# Patient Record
Sex: MALE | Race: WHITE | Employment: OTHER | URBAN - METROPOLITAN AREA
[De-identification: names, ages, dates, MRNs, and addresses within clinical notes are randomized per-mention and may not be internally consistent; named-entity substitution may affect disease eponyms.]

---

## 2017-11-27 PROBLEM — A41.9 SEPSIS (HCC): Status: ACTIVE | Noted: 2017-11-27

## 2017-11-27 PROBLEM — L02.414 ABSCESS OF LEFT FOREARM: Status: ACTIVE | Noted: 2017-11-27

## 2017-11-27 PROBLEM — A41.9 SEVERE SEPSIS (HCC): Status: ACTIVE | Noted: 2017-11-27

## 2017-11-27 PROBLEM — R73.9 HYPERGLYCEMIA: Status: ACTIVE | Noted: 2017-11-27

## 2017-11-27 PROBLEM — N17.9 AKI (ACUTE KIDNEY INJURY) (HCC): Status: ACTIVE | Noted: 2017-11-27

## 2017-11-27 PROBLEM — R65.20 SEVERE SEPSIS (HCC): Status: ACTIVE | Noted: 2017-11-27

## 2018-07-29 ENCOUNTER — HOSPITAL ENCOUNTER (INPATIENT)
Age: 48
LOS: 5 days | Discharge: HOME | DRG: 751 | End: 2018-08-04
Attending: EMERGENCY MEDICINE | Admitting: PSYCHIATRY & NEUROLOGY
Payer: MEDICAID

## 2018-07-29 DIAGNOSIS — R45.89 SUICIDE RISK: Primary | ICD-10-CM

## 2018-07-29 DIAGNOSIS — E11.65 POORLY CONTROLLED DIABETES MELLITUS (HCC): ICD-10-CM

## 2018-07-29 DIAGNOSIS — E86.0 DEHYDRATION, MODERATE: ICD-10-CM

## 2018-07-29 DIAGNOSIS — Z91.199 MEDICALLY NONCOMPLIANT: ICD-10-CM

## 2018-07-29 LAB
BASOPHILS ABSOLUTE: 0.1 K/UL (ref 0–0.2)
BASOPHILS RELATIVE PERCENT: 1.1 %
EOSINOPHILS ABSOLUTE: 0.4 K/UL (ref 0–0.6)
EOSINOPHILS RELATIVE PERCENT: 3.9 %
HCT VFR BLD CALC: 43.2 % (ref 40.5–52.5)
HEMOGLOBIN: 14.5 G/DL (ref 13.5–17.5)
LYMPHOCYTES ABSOLUTE: 1.4 K/UL (ref 1–5.1)
LYMPHOCYTES RELATIVE PERCENT: 13.7 %
MCH RBC QN AUTO: 27.9 PG (ref 26–34)
MCHC RBC AUTO-ENTMCNC: 33.5 G/DL (ref 31–36)
MCV RBC AUTO: 83.2 FL (ref 80–100)
MONOCYTES ABSOLUTE: 0.8 K/UL (ref 0–1.3)
MONOCYTES RELATIVE PERCENT: 8 %
NEUTROPHILS ABSOLUTE: 7.4 K/UL (ref 1.7–7.7)
NEUTROPHILS RELATIVE PERCENT: 73.3 %
PDW BLD-RTO: 13.2 % (ref 12.4–15.4)
PLATELET # BLD: 277 K/UL (ref 135–450)
PMV BLD AUTO: 9.4 FL (ref 5–10.5)
RBC # BLD: 5.19 M/UL (ref 4.2–5.9)
WBC # BLD: 10.1 K/UL (ref 4–11)

## 2018-07-29 PROCEDURE — 99285 EMERGENCY DEPT VISIT HI MDM: CPT

## 2018-07-29 PROCEDURE — 80053 COMPREHEN METABOLIC PANEL: CPT

## 2018-07-29 PROCEDURE — 82010 KETONE BODYS QUAN: CPT

## 2018-07-29 PROCEDURE — 81001 URINALYSIS AUTO W/SCOPE: CPT

## 2018-07-29 PROCEDURE — G0480 DRUG TEST DEF 1-7 CLASSES: HCPCS

## 2018-07-29 PROCEDURE — 85025 COMPLETE CBC W/AUTO DIFF WBC: CPT

## 2018-07-29 PROCEDURE — 80307 DRUG TEST PRSMV CHEM ANLYZR: CPT

## 2018-07-29 ASSESSMENT — PAIN SCALES - GENERAL: PAINLEVEL_OUTOF10: 0

## 2018-07-30 LAB
A/G RATIO: 0.8 (ref 1.1–2.2)
A/G RATIO: 0.9 (ref 1.1–2.2)
ACETAMINOPHEN LEVEL: <5 UG/ML (ref 10–30)
ALBUMIN SERPL-MCNC: 2.5 G/DL (ref 3.4–5)
ALBUMIN SERPL-MCNC: 3.3 G/DL (ref 3.4–5)
ALP BLD-CCNC: 151 U/L (ref 40–129)
ALP BLD-CCNC: 94 U/L (ref 40–129)
ALT SERPL-CCNC: 10 U/L (ref 10–40)
ALT SERPL-CCNC: 9 U/L (ref 10–40)
AMPHETAMINE SCREEN, URINE: ABNORMAL
ANION GAP SERPL CALCULATED.3IONS-SCNC: 13 MMOL/L (ref 3–16)
ANION GAP SERPL CALCULATED.3IONS-SCNC: 9 MMOL/L (ref 3–16)
AST SERPL-CCNC: 11 U/L (ref 15–37)
AST SERPL-CCNC: 13 U/L (ref 15–37)
BARBITURATE SCREEN URINE: ABNORMAL
BASE EXCESS VENOUS: 0.1 MMOL/L (ref -3–3)
BENZODIAZEPINE SCREEN, URINE: ABNORMAL
BETA-HYDROXYBUTYRATE: 0.1 MMOL/L (ref 0–0.27)
BILIRUB SERPL-MCNC: <0.2 MG/DL (ref 0–1)
BILIRUB SERPL-MCNC: <0.2 MG/DL (ref 0–1)
BILIRUBIN URINE: NEGATIVE
BLOOD, URINE: NEGATIVE
BUN BLDV-MCNC: 27 MG/DL (ref 7–20)
BUN BLDV-MCNC: 31 MG/DL (ref 7–20)
CALCIUM SERPL-MCNC: 8.6 MG/DL (ref 8.3–10.6)
CALCIUM SERPL-MCNC: 9.7 MG/DL (ref 8.3–10.6)
CANNABINOID SCREEN URINE: POSITIVE
CARBOXYHEMOGLOBIN: 7.1 % (ref 0–1.5)
CHLORIDE BLD-SCNC: 101 MMOL/L (ref 99–110)
CHLORIDE BLD-SCNC: 90 MMOL/L (ref 99–110)
CHP ED QC CHECK: YES
CLARITY: CLEAR
CO2: 20 MMOL/L (ref 21–32)
CO2: 24 MMOL/L (ref 21–32)
COCAINE METABOLITE SCREEN URINE: ABNORMAL
COLOR: YELLOW
CREAT SERPL-MCNC: 0.9 MG/DL (ref 0.9–1.3)
CREAT SERPL-MCNC: 1.4 MG/DL (ref 0.9–1.3)
ETHANOL: NORMAL MG/DL (ref 0–0.08)
GFR AFRICAN AMERICAN: >60
GFR AFRICAN AMERICAN: >60
GFR NON-AFRICAN AMERICAN: 55
GFR NON-AFRICAN AMERICAN: >60
GLOBULIN: 3.1 G/DL
GLOBULIN: 3.7 G/DL
GLUCOSE BLD-MCNC: 231 MG/DL (ref 70–99)
GLUCOSE BLD-MCNC: 252 MG/DL (ref 70–99)
GLUCOSE BLD-MCNC: 308 MG/DL (ref 70–99)
GLUCOSE BLD-MCNC: 324 MG/DL
GLUCOSE BLD-MCNC: 324 MG/DL (ref 70–99)
GLUCOSE BLD-MCNC: 348 MG/DL (ref 70–99)
GLUCOSE BLD-MCNC: 409 MG/DL (ref 70–99)
GLUCOSE BLD-MCNC: 824 MG/DL (ref 70–99)
GLUCOSE URINE: >=1000 MG/DL
HCO3 VENOUS: 25.2 MMOL/L (ref 23–29)
KETONES, URINE: NEGATIVE MG/DL
LEUKOCYTE ESTERASE, URINE: NEGATIVE
Lab: ABNORMAL
METHADONE SCREEN, URINE: ABNORMAL
METHEMOGLOBIN VENOUS: 0.2 %
MICROSCOPIC EXAMINATION: YES
NITRITE, URINE: NEGATIVE
O2 CONTENT, VEN: 19 VOL %
O2 SAT, VEN: 94 %
O2 THERAPY: ABNORMAL
OPIATE SCREEN URINE: ABNORMAL
OXYCODONE URINE: ABNORMAL
PCO2, VEN: 42.7 MMHG (ref 40–50)
PERFORMED ON: ABNORMAL
PH UA: 5
PH UA: 6
PH VENOUS: 7.39 (ref 7.35–7.45)
PHENCYCLIDINE SCREEN URINE: ABNORMAL
PO2, VEN: 69.2 MMHG (ref 25–40)
POTASSIUM SERPL-SCNC: 3.8 MMOL/L (ref 3.5–5.1)
POTASSIUM SERPL-SCNC: 4.3 MMOL/L (ref 3.5–5.1)
PROPOXYPHENE SCREEN: ABNORMAL
PROTEIN UA: ABNORMAL MG/DL
RBC UA: NORMAL /HPF (ref 0–2)
SALICYLATE, SERUM: <0.3 MG/DL (ref 15–30)
SODIUM BLD-SCNC: 127 MMOL/L (ref 136–145)
SODIUM BLD-SCNC: 130 MMOL/L (ref 136–145)
SPECIFIC GRAVITY UA: <=1.005
TCO2 CALC VENOUS: 27 MMOL/L
TOTAL PROTEIN: 5.6 G/DL (ref 6.4–8.2)
TOTAL PROTEIN: 7 G/DL (ref 6.4–8.2)
URINE TYPE: ABNORMAL
UROBILINOGEN, URINE: 0.2 E.U./DL
WBC UA: NORMAL /HPF (ref 0–5)

## 2018-07-30 PROCEDURE — 6370000000 HC RX 637 (ALT 250 FOR IP): Performed by: PSYCHIATRY & NEUROLOGY

## 2018-07-30 PROCEDURE — 1240000000 HC EMOTIONAL WELLNESS R&B

## 2018-07-30 PROCEDURE — 6370000000 HC RX 637 (ALT 250 FOR IP): Performed by: EMERGENCY MEDICINE

## 2018-07-30 PROCEDURE — 83036 HEMOGLOBIN GLYCOSYLATED A1C: CPT

## 2018-07-30 PROCEDURE — 80053 COMPREHEN METABOLIC PANEL: CPT

## 2018-07-30 PROCEDURE — 99222 1ST HOSP IP/OBS MODERATE 55: CPT | Performed by: PHYSICIAN ASSISTANT

## 2018-07-30 PROCEDURE — 2580000003 HC RX 258: Performed by: EMERGENCY MEDICINE

## 2018-07-30 PROCEDURE — 36415 COLL VENOUS BLD VENIPUNCTURE: CPT

## 2018-07-30 PROCEDURE — 82803 BLOOD GASES ANY COMBINATION: CPT

## 2018-07-30 PROCEDURE — 6370000000 HC RX 637 (ALT 250 FOR IP): Performed by: PHYSICIAN ASSISTANT

## 2018-07-30 RX ORDER — FENOFIBRATE 160 MG/1
160 TABLET ORAL DAILY
Status: DISCONTINUED | OUTPATIENT
Start: 2018-07-30 | End: 2018-08-04 | Stop reason: HOSPADM

## 2018-07-30 RX ORDER — RANITIDINE 300 MG/1
300 TABLET ORAL NIGHTLY
Status: DISCONTINUED | OUTPATIENT
Start: 2018-07-30 | End: 2018-07-30

## 2018-07-30 RX ORDER — NICOTINE POLACRILEX 4 MG
15 LOZENGE BUCCAL PRN
Status: DISCONTINUED | OUTPATIENT
Start: 2018-07-30 | End: 2018-08-04 | Stop reason: HOSPADM

## 2018-07-30 RX ORDER — NICOTINE 21 MG/24HR
1 PATCH, TRANSDERMAL 24 HOURS TRANSDERMAL DAILY
Status: DISCONTINUED | OUTPATIENT
Start: 2018-07-30 | End: 2018-08-04 | Stop reason: HOSPADM

## 2018-07-30 RX ORDER — ACETAMINOPHEN 325 MG/1
650 TABLET ORAL EVERY 4 HOURS PRN
Status: DISCONTINUED | OUTPATIENT
Start: 2018-07-30 | End: 2018-08-04 | Stop reason: HOSPADM

## 2018-07-30 RX ORDER — GLUCAGON 1 MG/ML
1 KIT INJECTION PRN
Status: DISCONTINUED | OUTPATIENT
Start: 2018-07-30 | End: 2018-08-04 | Stop reason: HOSPADM

## 2018-07-30 RX ORDER — ASPIRIN 81 MG/1
81 TABLET, CHEWABLE ORAL DAILY
Status: DISCONTINUED | OUTPATIENT
Start: 2018-07-30 | End: 2018-08-04 | Stop reason: HOSPADM

## 2018-07-30 RX ORDER — FAMOTIDINE 20 MG/1
40 TABLET, FILM COATED ORAL NIGHTLY
Status: DISCONTINUED | OUTPATIENT
Start: 2018-07-30 | End: 2018-08-04 | Stop reason: HOSPADM

## 2018-07-30 RX ORDER — DEXTROSE MONOHYDRATE 50 MG/ML
100 INJECTION, SOLUTION INTRAVENOUS PRN
Status: DISCONTINUED | OUTPATIENT
Start: 2018-07-30 | End: 2018-08-04 | Stop reason: HOSPADM

## 2018-07-30 RX ORDER — LOSARTAN POTASSIUM 25 MG/1
50 TABLET ORAL 2 TIMES DAILY
Status: DISCONTINUED | OUTPATIENT
Start: 2018-07-30 | End: 2018-08-04 | Stop reason: HOSPADM

## 2018-07-30 RX ORDER — 0.9 % SODIUM CHLORIDE 0.9 %
1000 INTRAVENOUS SOLUTION INTRAVENOUS ONCE
Status: DISCONTINUED | OUTPATIENT
Start: 2018-07-30 | End: 2018-07-30

## 2018-07-30 RX ORDER — BUPROPION HYDROCHLORIDE 150 MG/1
150 TABLET ORAL DAILY
Status: DISCONTINUED | OUTPATIENT
Start: 2018-07-30 | End: 2018-08-04 | Stop reason: HOSPADM

## 2018-07-30 RX ORDER — DEXTROSE MONOHYDRATE 25 G/50ML
12.5 INJECTION, SOLUTION INTRAVENOUS PRN
Status: DISCONTINUED | OUTPATIENT
Start: 2018-07-30 | End: 2018-08-04 | Stop reason: HOSPADM

## 2018-07-30 RX ORDER — 0.9 % SODIUM CHLORIDE 0.9 %
1000 INTRAVENOUS SOLUTION INTRAVENOUS ONCE
Status: COMPLETED | OUTPATIENT
Start: 2018-07-30 | End: 2018-07-30

## 2018-07-30 RX ORDER — PANTOPRAZOLE SODIUM 40 MG/1
40 TABLET, DELAYED RELEASE ORAL DAILY
Status: DISCONTINUED | OUTPATIENT
Start: 2018-07-30 | End: 2018-08-04 | Stop reason: HOSPADM

## 2018-07-30 RX ORDER — INSULIN GLARGINE 100 [IU]/ML
50 INJECTION, SOLUTION SUBCUTANEOUS NIGHTLY
Status: DISCONTINUED | OUTPATIENT
Start: 2018-07-30 | End: 2018-08-04 | Stop reason: HOSPADM

## 2018-07-30 RX ORDER — FLUOXETINE HYDROCHLORIDE 20 MG/1
20 CAPSULE ORAL DAILY
Status: DISCONTINUED | OUTPATIENT
Start: 2018-07-30 | End: 2018-07-30

## 2018-07-30 RX ADMIN — ASPIRIN 81 MG CHEWABLE TABLET 81 MG: 81 TABLET CHEWABLE at 15:47

## 2018-07-30 RX ADMIN — FENOFIBRATE 160 MG: 160 TABLET ORAL at 15:47

## 2018-07-30 RX ADMIN — ACETAMINOPHEN 650 MG: 325 TABLET ORAL at 21:28

## 2018-07-30 RX ADMIN — SODIUM CHLORIDE 1000 ML: 9 INJECTION, SOLUTION INTRAVENOUS at 01:55

## 2018-07-30 RX ADMIN — SODIUM CHLORIDE 1000 ML: 9 INJECTION, SOLUTION INTRAVENOUS at 03:50

## 2018-07-30 RX ADMIN — INSULIN LISPRO 4 UNITS: 100 INJECTION, SOLUTION INTRAVENOUS; SUBCUTANEOUS at 21:20

## 2018-07-30 RX ADMIN — LOSARTAN POTASSIUM 50 MG: 25 TABLET, FILM COATED ORAL at 21:15

## 2018-07-30 RX ADMIN — PANTOPRAZOLE SODIUM 40 MG: 40 TABLET, DELAYED RELEASE ORAL at 15:47

## 2018-07-30 RX ADMIN — INSULIN HUMAN 4 UNITS: 100 INJECTION, SOLUTION PARENTERAL at 11:53

## 2018-07-30 RX ADMIN — SODIUM CHLORIDE 8 UNITS/HR: 9 INJECTION, SOLUTION INTRAVENOUS at 01:56

## 2018-07-30 RX ADMIN — FAMOTIDINE 40 MG: 20 TABLET ORAL at 21:14

## 2018-07-30 RX ADMIN — INSULIN LISPRO 6 UNITS: 100 INJECTION, SOLUTION INTRAVENOUS; SUBCUTANEOUS at 17:06

## 2018-07-30 RX ADMIN — LOSARTAN POTASSIUM 50 MG: 25 TABLET, FILM COATED ORAL at 15:47

## 2018-07-30 RX ADMIN — INSULIN HUMAN 8 UNITS: 100 INJECTION, SOLUTION PARENTERAL at 09:27

## 2018-07-30 RX ADMIN — INSULIN GLARGINE 50 UNITS: 100 INJECTION, SOLUTION SUBCUTANEOUS at 21:22

## 2018-07-30 RX ADMIN — BUPROPION HYDROCHLORIDE 150 MG: 150 TABLET, FILM COATED, EXTENDED RELEASE ORAL at 15:47

## 2018-07-30 ASSESSMENT — SLEEP AND FATIGUE QUESTIONNAIRES
SLEEP PATTERN: DISTURBED/INTERRUPTED SLEEP
DIFFICULTY ARISING: NO
DO YOU USE A SLEEP AID: NO
AVERAGE NUMBER OF SLEEP HOURS: 8
DIFFICULTY STAYING ASLEEP: YES
DO YOU HAVE DIFFICULTY SLEEPING: YES
DIFFICULTY FALLING ASLEEP: NO
RESTFUL SLEEP: NO

## 2018-07-30 ASSESSMENT — PATIENT HEALTH QUESTIONNAIRE - PHQ9: SUM OF ALL RESPONSES TO PHQ QUESTIONS 1-9: 3

## 2018-07-30 ASSESSMENT — PAIN SCALES - GENERAL: PAINLEVEL_OUTOF10: 6

## 2018-07-30 ASSESSMENT — LIFESTYLE VARIABLES: HISTORY_ALCOHOL_USE: NO

## 2018-07-30 NOTE — ED NOTES
BS rechecked notified Dr. Carol Barker. New orders obtained.       Talya Osullivan, MUKUND  07/30/18 0085

## 2018-07-30 NOTE — ED PROVIDER NOTES
Triage Chief Complaint:   Suicidal (pt states \"I want to go to sleep and never wake up\" No immediate plan. Pt is homeless.)    [place]:  Chris Burns is a 41 y.o. male that presents with thoughts of suicide. He's been having thoughts of suicide for some time. These thoughts become more serious in the past 24 hours. Her department for evaluation. He does not have a plan. No previous history of suicide attempt. He has diabetes and uses insulin. Denies fever. No headache or stiff neck. Has slight cough. Does smoke. No productive sputum or hemoptysis. No chest pain or palpitations. No abdominal pain or vomiting. No flank pain or urinary symptoms. ROS:  Total 10 systems are reviewed and are negative except for the HPI.     Past Medical History:   Diagnosis Date    Back pain     Chronic    Bipolar 1 disorder ([facility] 2/13    Chest pain     Secondary to GERD    Chronic kidney disease     CKD (chronic kidney disease)     DDD (degenerative disc disease), lumbar 2013    + MRI - no narcotics from this office    Depression, endogenous ([address]     suicide attempt 10/11, [facility]; [facility]    Diabetes mellitus ([facility] [address] 1998    GERD (gastroesophageal reflux disease)     Hepatitis C 11/29/2017    + RNA by pcr    Hepatitis C antibody positive in blood 11/27/2017    AB +    Hyperlipidemia     LDL 80    Hypertension     Hypotestosteronism 1/12    Treatment not effective per Dr Lorelei Sylvester Marijuana use     Narcotic abuse     Opiate abuse, continuous     Proteinuria     Biopsy proven diabetic nephropathy    Vitamin D deficiency 6/25/2013     Past Surgical History:   Procedure Laterality Date    APPENDECTOMY  1985    BACK SURGERY  4/25/2013    Hemilaminectomy L5S1 right/Disectomy of Herniated L5S1 right    DENTAL SURGERY  2001    oral surgey    KIDNEY BIOPSY      KNEE ARTHROSCOPY      cyst removal    OTHER SURGICAL HISTORY Left 11/28/2017    left arm Irrigation and drainage    VASECTOMY       Family History   Problem Relation Age of Onset    Depression Father     Heart Disease Father     High Blood Pressure Father     High Cholesterol Father     Early Death Father 39     Social History     Social History    Marital status:      Spouse name: N/A    Number of children: N/A    Years of education: N/A     Occupational History    Not on file. Social History Main Topics    Smoking status: Current Every Day Smoker     Packs/day: 1.00     Years: 23.00     Types: Cigarettes    Smokeless tobacco: Never Used    Alcohol use No    Drug use: Yes     Types: Marijuana, IV, Methamphetamines      Comment: IV meth as of 11/27/17.     Sexual activity: Yes     Partners: Female     Other Topics Concern    Not on file     Social History Narrative    No narrative on file     Current Facility-Administered Medications   Medication Dose Route Frequency Provider Last Rate Last Dose    0.9 % sodium chloride bolus  1,000 mL Intravenous Once Dalton Cooper MD 1,000 mL/hr at 07/30/18 0155 1,000 mL at 07/30/18 0155    insulin regular (HUMULIN R;NOVOLIN R) 100 Units in sodium chloride 0.9 % 100 mL infusion  8 Units/hr Intravenous Continuous aDlton Cooper MD 8 mL/hr at 07/30/18 0156 8 Units/hr at 07/30/18 0156     Current Outpatient Prescriptions   Medication Sig Dispense Refill    FLUoxetine (PROZAC) 20 MG capsule Take 20 mg by mouth daily      vitamin D (ERGOCALCIFEROL) 25343 units CAPS capsule Take 50,000 Units by mouth once a week      fenofibrate (TRICOR) 145 MG tablet Take 145 mg by mouth daily      ranitidine (ZANTAC) 300 MG tablet Take 300 mg by mouth nightly      naproxen (NAPROSYN) 500 MG tablet Take 1 tablet by mouth 2 times daily (with meals) for 5 days 10 tablet 0    Acetaminophen 325 MG CAPS Take 650 mg by mouth every 6 hours as needed (pain) 32 capsule 0    insulin glargine (LANTUS) 100 UNIT/ML injection vial Inject 20 Units into the skin nightly 1 vial 3    losartan (COZAAR) 50 MG tablet Take 1 tablet by mouth 2 times daily 30 tablet 3    glucose blood VI test strips (FREESTYLE LITE) strip Patient test three times daily due to fluctuating blood sugars  Diagnosis = 250.00 NIDDM 100 strip 3    nicotine (NICODERM CQ) 21 MG/24HR Place 1 patch onto the skin daily 30 patch 3    Alcohol Swabs PADS 1 each by Does not apply route 4 times daily 150 each 5    FREESTYLE LANCETS MISC 1 each by Does not apply route 4 times daily 150 each 3    glucose monitoring kit (FREESTYLE) monitoring kit 1 kit by Does not apply route 4 times daily 1 kit 0    Insulin Pen Needle 29G X 12.7MM MISC 1 each by Does not apply route 4 times daily 100 each 3    HUMALOG 100 UNIT/ML injection vial INJECT 0-16 UNITS 3 TIMES A DAY BEFORE MEALS  3    citalopram (CELEXA) 40 MG tablet Take 40 mg by mouth daily      pantoprazole (PROTONIX) 40 MG tablet Take 40 mg by mouth daily      aspirin 81 MG tablet Take 81 mg by mouth daily. Allergies   Allergen Reactions    Mushroom Extract Complex Anaphylaxis    Metformin And Related Other (See Comments)      DOES NOT WANT PATIENT TAKING DUE TO STAGE 3 KIDNEY FAILURE    Penicillins     Ultram [Tramadol Hcl]      States he gets HAs     Codeine Rash     Rash         Nursing Notes Reviewed    Physical Exam:  ED Triage Vitals [07/29/18 2237]   Enc Vitals Group      BP (!) 142/71      Pulse 85      Resp 16      Temp 98.2 °F (36.8 °C)      Temp Source Tympanic      SpO2 98 %      Weight 140 lb (63.5 kg)      Height 5' 10\" (1.778 m)      Head Circumference       Peak Flow       Pain Score       Pain Loc       Pain Edu? Excl. in [address]? GENERAL APPEARANCE: Awake and alert. Cooperative. No acute distress. He is sitting up for examination. HEAD: Normocephalic. Atraumatic. EYES: EOM's grossly intact. Sclera anicteric. PEERL  ENT: Mucous membranes are moist. Tolerates saliva. No trismus. NECK: Supple. No meningismus. Trachea midline. No JVD. HEART: RRR. Radial pulses 2+.   Heart without murmur. LUNGS: Respirations unlabored. CTAB  ABDOMEN: Soft. Non-tender. No guarding or rebound. No CVAT. EXTREMITIES: No acute deformities. Hand  are equal.  No joint swelling. No lower extremity calf pain or edema. SKIN: Warm and dry. NEUROLOGICAL:  Moves all 4 extremities spontaneously. No focal motor or sensory abnormalities of the upper or lower extremities. PSYCHIATRIC: Normal mood.     I have reviewed and interpreted all of the currently available lab results from this visit (if applicable):  Results for orders placed or performed during the hospital encounter of 07/29/18   CBC Auto Differential   Result Value Ref Range    WBC 10.1 4.0 - 11.0 K/uL    RBC 5.19 4.20 - 5.90 M/uL    Hemoglobin 14.5 13.5 - 17.5 g/dL    Hematocrit 43.2 40.5 - 52.5 %    MCV 83.2 80.0 - 100.0 fL    MCH 27.9 26.0 - 34.0 pg    MCHC 33.5 31.0 - 36.0 g/dL    RDW 13.2 12.4 - 15.4 %    Platelets 038 494 - 295 K/uL    MPV 9.4 5.0 - 10.5 fL    Neutrophils % 73.3 %    Lymphocytes % 13.7 %    Monocytes % 8.0 %    Eosinophils % 3.9 %    Basophils % 1.1 %    Neutrophils # 7.4 1.7 - 7.7 K/uL    Lymphocytes # 1.4 1.0 - 5.1 K/uL    Monocytes # 0.8 0.0 - 1.3 K/uL    Eosinophils # 0.4 0.0 - 0.6 K/uL    Basophils # 0.1 0.0 - 0.2 K/uL   Comprehensive Metabolic Panel   Result Value Ref Range    Sodium 127 (L) 136 - 145 mmol/L    Potassium 4.3 3.5 - 5.1 mmol/L    Chloride 90 (L) 99 - 110 mmol/L    CO2 24 21 - 32 mmol/L    Anion Gap 13 3 - 16    Glucose 824 (HH) 70 - 99 mg/dL    BUN 31 (H) 7 - 20 mg/dL    CREATININE 1.4 (H) 0.9 - 1.3 mg/dL    GFR Non-African American 55 (A) >60    GFR African American >60 >60    Calcium 9.7 8.3 - 10.6 mg/dL    Total Protein 7.0 6.4 - 8.2 g/dL    Alb 3.3 (L) 3.4 - 5.0 g/dL    Albumin/Globulin Ratio 0.9 (L) 1.1 - 2.2    Total Bilirubin <0.2 0.0 - 1.0 mg/dL    Alkaline Phosphatase 151 (H) 40 - 129 U/L    ALT 9 (L) 10 - 40 U/L    AST 13 (L) 15 - 37 U/L    Globulin 3.7 g/dL   Ethanol   Result Value Ref 2 hours. Initial glucose comes down from 800 to 400 to 230 with very brief infusion of insulin. I do not believe the patient's in DKA. Appears to be very sensitive to insulin. I believe hydration and proper insulin therapy is all that is required to control his glucose. Patient is medically stable. I recommend evaluation by the [facility] staff. Disposition as per the [facility] staff. Clinical Impression:  1. Suicide risk    2. Poorly controlled diabetes mellitus (HCC)    3. Dehydration, moderate    4.  Medically noncompliant      (Please note that portions of this note may have been completed with a voice recognition program. Efforts were made to edit the dictations but occasionally words are mis-transcribed.)    MD Alessandro Joseph MD  07/30/18 3647

## 2018-07-30 NOTE — PLAN OF CARE
Problem: Altered Mood, Depressive Behavior:  Goal: Able to verbalize and/or display a decrease in depressive symptoms  Able to verbalize and/or display a decrease in depressive symptoms   Outcome: Ongoing  Affect flat. Pleasant and cooperative. Medication compliant. Problem: Altered Mood, Deterioration in Function:  Goal: Maintenance of adequate nutrition will improve  Maintenance of adequate nutrition will improve  Outcome: Ongoing  Able to eat 80% of evening meal. Educated on dietary assistance with menu choices.

## 2018-07-30 NOTE — ED NOTES
Call placed to Dr. Tal Willard. Message left awaiting call back.       Yumiko Duff RN  07/30/18 4937

## 2018-07-30 NOTE — H&P
11/28/2017    left arm Irrigation and drainage    VASECTOMY         Medications Prior to Admission:    Prior to Admission medications    Medication Sig Start Date End Date Taking?  Authorizing Provider   FLUoxetine (PROZAC) 20 MG capsule Take 20 mg by mouth daily    Historical Provider, MD   vitamin D (ERGOCALCIFEROL) 40691 units CAPS capsule Take 50,000 Units by mouth once a week    Historical Provider, MD   fenofibrate (TRICOR) 145 MG tablet Take 145 mg by mouth daily    Historical Provider, MD   ranitidine (ZANTAC) 300 MG tablet Take 300 mg by mouth nightly    Historical Provider, MD   naproxen (NAPROSYN) 500 MG tablet Take 1 tablet by mouth 2 times daily (with meals) for 5 days 1/23/18 1/28/18  Tonya Andujar MD   Acetaminophen 325 MG CAPS Take 650 mg by mouth every 6 hours as needed (pain) 1/23/18 1/28/18  Tonya Andujar MD   insulin glargine (LANTUS) 100 UNIT/ML injection vial Inject 20 Units into the skin nightly  Patient taking differently: Inject 50 Units into the skin nightly  11/29/17   Pancho Area, MD   losartan (COZAAR) 50 MG tablet Take 1 tablet by mouth 2 times daily 11/29/17   Pancho Concepcion MD   glucose blood VI test strips (FREESTYLE LITE) strip Patient test three times daily due to fluctuating blood sugars  Diagnosis = 250.00 NIDDM 11/29/17 12/4/18  Pancho Concepcion MD   nicotine (NICODERM CQ) 21 MG/24HR Place 1 patch onto the skin daily 11/30/17   Pancho Area, MD   Alcohol Swabs PADS 1 each by Does not apply route 4 times daily 11/29/17   Pancho Concepcion MD   FREESTYLE LANCETS MISC 1 each by Does not apply route 4 times daily 11/29/17   Pancho Concepcion MD   glucose monitoring kit (FREESTYLE) monitoring kit 1 kit by Does not apply route 4 times daily 11/29/17   Pancho Concepcion MD   Insulin Pen Needle 29G X 12.7MM MISC 1 each by Does not apply route 4 times daily 11/29/17   Pancho Concepcion MD   HUMALOG 100 UNIT/ML injection vial INJECT 0-16 UNITS 3 TIMES A DAY BEFORE MEALS 10/25/17   Historical Provider, MD   pantoprazole (PROTONIX) 40 MG tablet Take 40 mg by mouth daily    Historical Provider, MD   aspirin 81 MG tablet Take 81 mg by mouth daily. Historical Provider, MD       Allergies:  Mushroom extract complex; Metformin and related; Penicillins; Ultram [tramadol hcl]; and Codeine    Social History:      TOBACCO:   reports that he has been smoking Cigarettes. He has a 23.00 pack-year smoking history. He has never used smokeless tobacco.  ETOH:   reports that he does not drink alcohol. Family History:   Positive as follows:    Family History   Problem Relation Age of Onset    Depression Father     Heart Disease Father     High Blood Pressure Father     High Cholesterol Father     Early Death Father 39       REVIEW OF SYSTEMS:       Constitutional: Negative for fever   HENT: Negative for sore throat   Eyes: Negative for redness   Respiratory: Negative  for dyspnea, cough   Cardiovascular: Negative for chest pain   Gastrointestinal: Negative for vomiting, diarrhea   Genitourinary: Negative for hematuria   Musculoskeletal: Negative for arthralgias   Skin: Negative for rash   Neurological: Negative for syncope    Hematological: Negative for easy bruising/bleeding   Psychiatric/Behavorial: Per psychiatry team evaluation     PHYSICAL EXAM:    BP (!) 161/76   Pulse 75   Temp 97.8 °F (36.6 °C) (Oral)   Resp 17   Ht 5' 10\" (1.778 m)   Wt 140 lb (63.5 kg)   SpO2 99%   BMI 20.09 kg/m²     Gen: Middle aged male. No distress. Alert. Eyes: PERRL. No sclera icterus. No conjunctival injection. ENT: No discharge. Pharynx clear. Neck: No JVD. No Carotid Bruit. Trachea midline. Resp: No accessory muscle use. No crackles. No wheezes. No rhonchi. CV: Regular rate. Regular rhythm. No murmur. No rub. No edema. GI: Non-tender. Non-distended. Normal bowel sounds. Skin: Warm and dry. No nodule on exposed extremities. No rash on exposed extremities.    M/S: No cyanosis. No joint deformity. No clubbing. Neuro: Awake. No focal neurologic deficit on exam.  Cranial nerves II through XII intact. Patient is able to ambulate without difficulty. Psych: Per psychiatry team evaluation     CBC:   Recent Labs      07/29/18   2315   WBC  10.1   HGB  14.5   HCT  43.2   MCV  83.2   PLT  277     BMP:   Recent Labs      07/29/18   2315  07/30/18   0832   NA  127*  130*   K  4.3  3.8   CL  90*  101   CO2  24  20*   BUN  31*  27*   CREATININE  1.4*  0.9     LIVER PROFILE:   Recent Labs      07/29/18   2315  07/30/18   0832   AST  13*  11*   ALT  9*  10   BILITOT  <0.2  <0.2   ALKPHOS  151*  94     UA:  Recent Labs      07/29/18   2300   COLORU  Yellow   PHUR  6.0  5.0   WBCUA  None seen   RBCUA  None seen   CLARITYU  Clear   SPECGRAV  <=1.005   LEUKOCYTESUR  Negative   UROBILINOGEN  0.2   BILIRUBINUR  Negative   BLOODU  Negative   GLUCOSEU  >=1000*          U/A:    Lab Results   Component Value Date    NITRITE n 01/18/2012    COLORU Yellow 07/29/2018    WBCUA None seen 07/29/2018    RBCUA None seen 07/29/2018    MUCUS Rare 06/19/2018    BACTERIA 1+ 06/19/2018    CLARITYU Clear 07/29/2018    SPECGRAV <=1.005 07/29/2018    LEUKOCYTESUR Negative 07/29/2018    BLOODU Negative 07/29/2018    GLUCOSEU >=1000 07/29/2018    GLUCOSEU >=1000 10/10/2011    AMORPHOUS 3+ 06/19/2018         ASSESSMENT/PLAN:  Bipolar 1 DO  - per psychiatry team    IDDM  - uncontrolled with  in the ED which has come down nicely with insulin.   He had not used any insulin for 4-5 days prior to admission  - resume home lantus 50 U QHS and use SSI for now     Hyponatremia  - false related to hyperglycemia    HTN  - uncontrolled, restart home cozaar and monitor     GERD  - cont PPI    THC abuse  Tobacco Abuse  -Recommended cessation    Former Opiate dependence  - no heroin use since 2015     Yudy Sanchez PA-C  7/30/2018 2:13 PM

## 2018-07-30 NOTE — ED NOTES
Level of Care Disposition:       Patient was seen by ED provider and [facility] staff. The case presented to psychiatric provider on-call Dr. Kyra Morel. Based on the ED evaluation and information presented to the provider by this nurse it was determined that inpatient hospitalization is the least restrictive environment for the patient at this time. The patient will be admitted to the inpatient unit. RATIONALE FOR ADMISSION:   Patient has a mental illness: Bipolar, SI  Patient at imminent risk of danger to self as demonstrated by SI with thoughts of Overdosing on medications.           Insurance Pre certification Authorization: Lucia Montemayor form filled out and faxed       Jarad Harrington RN  07/30/18 4581

## 2018-07-30 NOTE — ED NOTES
Pt ambulatory to [facility] by ED RN. Pt placed in a gown and belongings placed in locker. Pt urine sample collected.       Stacy Ford  07/29/18 7405

## 2018-07-31 PROBLEM — F33.2 SEVERE EPISODE OF RECURRENT MAJOR DEPRESSIVE DISORDER, WITHOUT PSYCHOTIC FEATURES (HCC): Status: ACTIVE | Noted: 2018-07-31

## 2018-07-31 LAB
ANION GAP SERPL CALCULATED.3IONS-SCNC: 6 MMOL/L (ref 3–16)
BUN BLDV-MCNC: 21 MG/DL (ref 7–20)
CALCIUM SERPL-MCNC: 8.8 MG/DL (ref 8.3–10.6)
CHLORIDE BLD-SCNC: 104 MMOL/L (ref 99–110)
CO2: 25 MMOL/L (ref 21–32)
CREAT SERPL-MCNC: 1 MG/DL (ref 0.9–1.3)
ESTIMATED AVERAGE GLUCOSE: 343.6 MG/DL
GFR AFRICAN AMERICAN: >60
GFR NON-AFRICAN AMERICAN: >60
GLUCOSE BLD-MCNC: 179 MG/DL (ref 70–99)
GLUCOSE BLD-MCNC: 222 MG/DL (ref 70–99)
GLUCOSE BLD-MCNC: 266 MG/DL (ref 70–99)
GLUCOSE BLD-MCNC: 271 MG/DL (ref 70–99)
GLUCOSE BLD-MCNC: 282 MG/DL (ref 70–99)
HBA1C MFR BLD: 13.6 %
PERFORMED ON: ABNORMAL
POTASSIUM SERPL-SCNC: 4.2 MMOL/L (ref 3.5–5.1)
SODIUM BLD-SCNC: 135 MMOL/L (ref 136–145)

## 2018-07-31 PROCEDURE — 6370000000 HC RX 637 (ALT 250 FOR IP): Performed by: PSYCHIATRY & NEUROLOGY

## 2018-07-31 PROCEDURE — 6370000000 HC RX 637 (ALT 250 FOR IP): Performed by: PHYSICIAN ASSISTANT

## 2018-07-31 PROCEDURE — 80048 BASIC METABOLIC PNL TOTAL CA: CPT

## 2018-07-31 PROCEDURE — 99223 1ST HOSP IP/OBS HIGH 75: CPT | Performed by: PSYCHIATRY & NEUROLOGY

## 2018-07-31 PROCEDURE — 36415 COLL VENOUS BLD VENIPUNCTURE: CPT

## 2018-07-31 PROCEDURE — 1240000000 HC EMOTIONAL WELLNESS R&B

## 2018-07-31 RX ORDER — LANOLIN ALCOHOL/MO/W.PET/CERES
6 CREAM (GRAM) TOPICAL NIGHTLY
Status: DISCONTINUED | OUTPATIENT
Start: 2018-07-31 | End: 2018-08-01

## 2018-07-31 RX ADMIN — INSULIN LISPRO 6 UNITS: 100 INJECTION, SOLUTION INTRAVENOUS; SUBCUTANEOUS at 17:13

## 2018-07-31 RX ADMIN — PANTOPRAZOLE SODIUM 40 MG: 40 TABLET, DELAYED RELEASE ORAL at 09:08

## 2018-07-31 RX ADMIN — LOSARTAN POTASSIUM 50 MG: 25 TABLET, FILM COATED ORAL at 21:13

## 2018-07-31 RX ADMIN — INSULIN LISPRO 4 UNITS: 100 INJECTION, SOLUTION INTRAVENOUS; SUBCUTANEOUS at 09:06

## 2018-07-31 RX ADMIN — FENOFIBRATE 160 MG: 160 TABLET ORAL at 09:08

## 2018-07-31 RX ADMIN — INSULIN LISPRO 6 UNITS: 100 INJECTION, SOLUTION INTRAVENOUS; SUBCUTANEOUS at 21:21

## 2018-07-31 RX ADMIN — BUPROPION HYDROCHLORIDE 150 MG: 150 TABLET, FILM COATED, EXTENDED RELEASE ORAL at 09:08

## 2018-07-31 RX ADMIN — INSULIN LISPRO 2 UNITS: 100 INJECTION, SOLUTION INTRAVENOUS; SUBCUTANEOUS at 13:14

## 2018-07-31 RX ADMIN — ASPIRIN 81 MG CHEWABLE TABLET 81 MG: 81 TABLET CHEWABLE at 17:14

## 2018-07-31 RX ADMIN — LOSARTAN POTASSIUM 50 MG: 25 TABLET, FILM COATED ORAL at 09:08

## 2018-07-31 RX ADMIN — FAMOTIDINE 40 MG: 20 TABLET ORAL at 21:13

## 2018-07-31 RX ADMIN — INSULIN GLARGINE 50 UNITS: 100 INJECTION, SOLUTION SUBCUTANEOUS at 21:22

## 2018-07-31 RX ADMIN — MELATONIN 3 MG ORAL TABLET 6 MG: 3 TABLET ORAL at 21:13

## 2018-07-31 ASSESSMENT — SLEEP AND FATIGUE QUESTIONNAIRES
DO YOU USE A SLEEP AID: NO
DIFFICULTY ARISING: YES
RESTFUL SLEEP: NO
DIFFICULTY STAYING ASLEEP: YES
DO YOU HAVE DIFFICULTY SLEEPING: YES
SLEEP PATTERN: DISTURBED/INTERRUPTED SLEEP
AVERAGE NUMBER OF SLEEP HOURS: 8
DIFFICULTY FALLING ASLEEP: YES

## 2018-07-31 ASSESSMENT — LIFESTYLE VARIABLES: HISTORY_ALCOHOL_USE: NO

## 2018-07-31 NOTE — PLAN OF CARE
Problem: Altered Mood, Depressive Behavior:  Goal: Able to verbalize acceptance of life and situations over which he or she has no control  Able to verbalize acceptance of life and situations over which he or she has no control   Outcome: Ongoing  Seclusive to room. Out for snack and to get medications and returns to room. Offers little to no conversation. Sleeping off and on throughout the night. Remains safe on the Unit.

## 2018-07-31 NOTE — H&P
History and Physical Dictated  Spent at least 70 minutes with evaluation process and more than 50% of the time was spent obtaining history and planning treatment with the patient  Dx: MDD

## 2018-07-31 NOTE — PLAN OF CARE
Problem: Altered Mood, Deterioration in Function:  Goal: Maintenance of adequate nutrition will improve  Maintenance of adequate nutrition will improve   Outcome: Ongoing  Up for meals and eating better. Twice tray brought to him, once he did go get his own tray. Secluive to room, otherwise, and no groups attended.

## 2018-07-31 NOTE — PLAN OF CARE
[facility]  Initial Interdisciplinary Treatment Plan NOTE    Review Date & Time: 7/31/18 1000    Patient was not in treatment team    Admission Type:   Admission Type: Voluntary    Reason for admission:  Reason for Admission: SI with thoughts of overdosing on medications, noncompliance with Diabetic Medications      Estimated Length of Stay Update:  3 to 5 days  Estimated Discharge Date Update: 8/2/18 to 8/4/18    PATIENT STRENGTHS:  Patient Strengths Strengths: Communication, Connection to output provider, Social Skills  Patient Strengths and Limitations:Limitations: Difficult relationships / poor social skills, Hopeless about future  Addictive Behavior:Addictive Behavior  In the past 3 months, have you felt or has someone told you that you have a problem with:  : None  Do you have a history of Chemical Use?: No  Do you have a history of Alcohol Use?: No  Do you have a history of Street Drug Abuse?: Yes  Histroy of Prescripton Drug Abuse?: No  Medical Problems:  Past Medical History:   Diagnosis Date    Back pain     Chronic    Bipolar 1 disorder ([facility] [address] 2/13    Chest pain     Secondary to GERD    Chronic kidney disease     CKD (chronic kidney disease)     DDD (degenerative disc disease), lumbar 2013    + MRI - no narcotics from this office    Depression, endogenous ([facility] [address]     suicide attempt 10/11, [facility]; [facility]    Diabetes mellitus ([facility] [address] 1998    GERD (gastroesophageal reflux disease)     Hepatitis C 11/29/2017    + RNA by pcr    Hepatitis C antibody positive in blood 11/27/2017    AB +    Hyperlipidemia         Hypertension     Hypotestosteronism 1/12    Treatment not effective per Dr Rivero    Marijuana use     Narcotic abuse     Opiate abuse, continuous     Proteinuria     Biopsy proven diabetic nephropathy    Vitamin D deficiency 6/25/2013       EDUCATION:   Learner Progress Toward Treatment Goals: Reviewed goals and plan of care    Method: Individual    Outcome: Verbalized understanding    PATIENT GOALS: none    PLAN/TREATMENT RECOMMENDATIONS UPDATE:Continue treatment and medication management.     GOALS UPDATE:   Time frame for Short-Term Goals: 2 days    Winston Nunes RN

## 2018-08-01 LAB
GLUCOSE BLD-MCNC: 104 MG/DL (ref 70–99)
GLUCOSE BLD-MCNC: 229 MG/DL (ref 70–99)
GLUCOSE BLD-MCNC: 244 MG/DL (ref 70–99)
GLUCOSE BLD-MCNC: 384 MG/DL (ref 70–99)
PERFORMED ON: ABNORMAL

## 2018-08-01 PROCEDURE — 1240000000 HC EMOTIONAL WELLNESS R&B

## 2018-08-01 PROCEDURE — 99233 SBSQ HOSP IP/OBS HIGH 50: CPT | Performed by: PSYCHIATRY & NEUROLOGY

## 2018-08-01 PROCEDURE — 6370000000 HC RX 637 (ALT 250 FOR IP): Performed by: PSYCHIATRY & NEUROLOGY

## 2018-08-01 PROCEDURE — 6370000000 HC RX 637 (ALT 250 FOR IP): Performed by: PHYSICIAN ASSISTANT

## 2018-08-01 RX ORDER — MIRTAZAPINE 15 MG/1
15 TABLET, ORALLY DISINTEGRATING ORAL NIGHTLY
Status: DISCONTINUED | OUTPATIENT
Start: 2018-08-01 | End: 2018-08-02

## 2018-08-01 RX ADMIN — INSULIN LISPRO 4 UNITS: 100 INJECTION, SOLUTION INTRAVENOUS; SUBCUTANEOUS at 08:30

## 2018-08-01 RX ADMIN — FENOFIBRATE 160 MG: 160 TABLET ORAL at 08:29

## 2018-08-01 RX ADMIN — BUPROPION HYDROCHLORIDE 150 MG: 150 TABLET, FILM COATED, EXTENDED RELEASE ORAL at 08:29

## 2018-08-01 RX ADMIN — MIRTAZAPINE 15 MG: 15 TABLET, ORALLY DISINTEGRATING ORAL at 20:23

## 2018-08-01 RX ADMIN — FAMOTIDINE 40 MG: 20 TABLET ORAL at 20:23

## 2018-08-01 RX ADMIN — ASPIRIN 81 MG CHEWABLE TABLET 81 MG: 81 TABLET CHEWABLE at 08:30

## 2018-08-01 RX ADMIN — LOSARTAN POTASSIUM 50 MG: 25 TABLET, FILM COATED ORAL at 08:29

## 2018-08-01 RX ADMIN — INSULIN GLARGINE 50 UNITS: 100 INJECTION, SOLUTION SUBCUTANEOUS at 21:13

## 2018-08-01 RX ADMIN — PANTOPRAZOLE SODIUM 40 MG: 40 TABLET, DELAYED RELEASE ORAL at 08:29

## 2018-08-01 RX ADMIN — INSULIN LISPRO 5 UNITS: 100 INJECTION, SOLUTION INTRAVENOUS; SUBCUTANEOUS at 21:13

## 2018-08-01 RX ADMIN — INSULIN LISPRO 4 UNITS: 100 INJECTION, SOLUTION INTRAVENOUS; SUBCUTANEOUS at 17:05

## 2018-08-01 RX ADMIN — LOSARTAN POTASSIUM 50 MG: 25 TABLET, FILM COATED ORAL at 20:23

## 2018-08-01 NOTE — PLAN OF CARE
Problem: Altered Mood, Depressive Behavior:  Goal: Able to verbalize and/or display a decrease in depressive symptoms  Able to verbalize and/or display a decrease in depressive symptoms   Outcome: Ongoing  Has remained in bed, in assigned room, throughout the morning. Does not attend groups. Is medication compliant. Problem: Altered Mood, Deterioration in Function:  Goal: Maintenance of adequate nutrition will improve  Maintenance of adequate nutrition will improve   Outcome: Ongoing  Does retrieve meal tray from cart and eat in his assigned room. Diet and fluids taken good. Verbalizes understanding of dietary needs related to his DM.

## 2018-08-01 NOTE — H&P
rehab in [place] on 04/2018 for 20 days at  [facility]. \"  He also failed the urine drug screen one point for marijuana  and meth that we placed him in rehab. He has a history of heroin abuse,  last use 2015. CURRENT MEDS:  Wellbutrin  mg in morning for 2 weeks. He is also on  insulin, which he had not been compliant with. Apparently, he is typically  on Lantus, Humalog, and in the past, Prozac, Tricor, Zantac, vitamin D.    MEDICAL HISTORY:  Significant for back pain, degenerative disk disease,  chronic kidney disease, diabetes mellitus, GERD, hepatitis C,  hyperlipidemia, hypertension. DRUG AND ALCOHOL:  History of marijuana abuse and narcotic abuse, opiate  abuse, methamphetamine abuse. Denies alcohol. LEGAL ISSUES:  He was in alf for a couple of months in 02/2018. He  violated as an EPO with his wife. He is currently on probation. Apparently, he and his wife were driving around in the car and police  stopped them and realized that he was not allowed to be with her, even  though she was in agreement with him being with her. SOCIAL HISTORY:  Homeless in [facility]. PHYSICAL EXAM, REVIEW OF SYSTEMS, VITAL SIGNS:  Per Tala Ni PA-C,  07/30/2018. LABORATORIES:  Reviewed. TRAUMA HISTORY:  None reported. MENTAL STATUS EXAM:  The patient is a 82-year-old white male who is  relatively cooperative. He made minimal eye contact. He was in bed. He  denied being psychotic and denied any auditory or visual hallucinations. He states he continues to be suicidal.  No homicidal ideation. Speech is  soft tone, normal rate. Insight and judgment is impaired. Thoughts were  coherent and logical.  Alert and oriented to person, place and time. He  says mood was down. Affect was constricted. No abnormal movements. Normal gait. Fund of knowledge and language intact. Attention,  concentration was adequate. Able to recall three objects immediately. DIAGNOSES:  Axis I:  1. Major depressive episode, recurrent, nonpsychotic, severe. 2.  Polysubstance abuse history. Axis II:  Deferred. Axis III:  Diabetes mellitus, chronic kidney disease, degenerative disk  disease, neuropathy and retinopathy. Axis IV:  Severe. Axis V:  40. PLAN:  1. We will continue to monitor for any self-harm threats. 2.  The patient will require a shelter. 3.  Also needs followup through [facility] and has had a  therapist, Cheryl Rankin in the past.  He has an appointment on  08/06/2018 according to his report. 4.  In full program.  5.  Develop appropriate coping strategies when depressed and suicidal.  6.  Discharge in the next 3-4 days.         Shaista Suarez MD    D: 07/31/2018 17:53:34       T: 07/31/2018 17:55:56     [email or web address]  Job#: [id number]     Doc#: [id number]    CC:

## 2018-08-02 LAB
GLUCOSE BLD-MCNC: 178 MG/DL (ref 70–99)
GLUCOSE BLD-MCNC: 291 MG/DL (ref 70–99)
GLUCOSE BLD-MCNC: 298 MG/DL (ref 70–99)
GLUCOSE BLD-MCNC: 409 MG/DL (ref 70–99)
PERFORMED ON: ABNORMAL

## 2018-08-02 PROCEDURE — 6370000000 HC RX 637 (ALT 250 FOR IP): Performed by: PHYSICIAN ASSISTANT

## 2018-08-02 PROCEDURE — 99233 SBSQ HOSP IP/OBS HIGH 50: CPT | Performed by: PSYCHIATRY & NEUROLOGY

## 2018-08-02 PROCEDURE — 6370000000 HC RX 637 (ALT 250 FOR IP): Performed by: PSYCHIATRY & NEUROLOGY

## 2018-08-02 PROCEDURE — 1240000000 HC EMOTIONAL WELLNESS R&B

## 2018-08-02 RX ORDER — QUETIAPINE FUMARATE 25 MG/1
50 TABLET, FILM COATED ORAL NIGHTLY
Status: DISCONTINUED | OUTPATIENT
Start: 2018-08-02 | End: 2018-08-04 | Stop reason: HOSPADM

## 2018-08-02 RX ADMIN — PANTOPRAZOLE SODIUM 40 MG: 40 TABLET, DELAYED RELEASE ORAL at 08:17

## 2018-08-02 RX ADMIN — INSULIN LISPRO 2 UNITS: 100 INJECTION, SOLUTION INTRAVENOUS; SUBCUTANEOUS at 12:08

## 2018-08-02 RX ADMIN — INSULIN LISPRO 6 UNITS: 100 INJECTION, SOLUTION INTRAVENOUS; SUBCUTANEOUS at 21:30

## 2018-08-02 RX ADMIN — LOSARTAN POTASSIUM 50 MG: 25 TABLET, FILM COATED ORAL at 08:17

## 2018-08-02 RX ADMIN — INSULIN LISPRO 6 UNITS: 100 INJECTION, SOLUTION INTRAVENOUS; SUBCUTANEOUS at 17:23

## 2018-08-02 RX ADMIN — FENOFIBRATE 160 MG: 160 TABLET ORAL at 08:17

## 2018-08-02 RX ADMIN — INSULIN LISPRO 6 UNITS: 100 INJECTION, SOLUTION INTRAVENOUS; SUBCUTANEOUS at 08:16

## 2018-08-02 RX ADMIN — ASPIRIN 81 MG CHEWABLE TABLET 81 MG: 81 TABLET CHEWABLE at 08:17

## 2018-08-02 RX ADMIN — QUETIAPINE FUMARATE 50 MG: 25 TABLET ORAL at 21:30

## 2018-08-02 RX ADMIN — INSULIN GLARGINE 50 UNITS: 100 INJECTION, SOLUTION SUBCUTANEOUS at 21:30

## 2018-08-02 RX ADMIN — BUPROPION HYDROCHLORIDE 150 MG: 150 TABLET, FILM COATED, EXTENDED RELEASE ORAL at 08:17

## 2018-08-02 RX ADMIN — LOSARTAN POTASSIUM 50 MG: 25 TABLET, FILM COATED ORAL at 21:30

## 2018-08-02 RX ADMIN — FAMOTIDINE 40 MG: 20 TABLET ORAL at 21:30

## 2018-08-02 NOTE — PROGRESS NOTES
Eosinophils % 07/29/2018 3.9  % Final    Basophils % 07/29/2018 1.1  % Final    Neutrophils # 07/29/2018 7.4  1.7 - 7.7 K/uL Final    Lymphocytes # 07/29/2018 1.4  1.0 - 5.1 K/uL Final    Monocytes # 07/29/2018 0.8  0.0 - 1.3 K/uL Final    Eosinophils # 07/29/2018 0.4  0.0 - 0.6 K/uL Final    Basophils # 07/29/2018 0.1  0.0 - 0.2 K/uL Final    Sodium 07/29/2018 127* 136 - 145 mmol/L Final    Potassium 07/29/2018 4.3  3.5 - 5.1 mmol/L Final    Chloride 07/29/2018 90* 99 - 110 mmol/L Final    CO2 07/29/2018 24  21 - 32 mmol/L Final    Anion Gap 07/29/2018 13  3 - 16 Final    Glucose 07/29/2018 824* 70 - 99 mg/dL Final    BUN 07/29/2018 31* 7 - 20 mg/dL Final    CREATININE 07/29/2018 1.4* 0.9 - 1.3 mg/dL Final    GFR Non- 07/29/2018 55* >60 Final    Comment: >60 mL/min/1.73m2 EGFR, calc. for ages 25 and older using the  MDRD formula (not corrected for weight), is valid for stable  renal function.  GFR  07/29/2018 >60  >60 Final    Comment: Chronic Kidney Disease: less than 60 ml/min/1.73 sq.m. Kidney Failure: less than 15 ml/min/1.73 sq.m. Results valid for patients 18 years and older.       Calcium 07/29/2018 9.7  8.3 - 10.6 mg/dL Final    Total Protein 07/29/2018 7.0  6.4 - 8.2 g/dL Final    Alb 07/29/2018 3.3* 3.4 - 5.0 g/dL Final    Albumin/Globulin Ratio 07/29/2018 0.9* 1.1 - 2.2 Final    Total Bilirubin 07/29/2018 <0.2  0.0 - 1.0 mg/dL Final    Alkaline Phosphatase 07/29/2018 151* 40 - 129 U/L Final    ALT 07/29/2018 9* 10 - 40 U/L Final    AST 07/29/2018 13* 15 - 37 U/L Final    Globulin 07/29/2018 3.7  g/dL Final    Ethanol Lvl 07/29/2018 None Detected  mg/dL Final    Comment:    None Detected  Conversion factor:  100 mg/dl = .100 g/dl  For Medical Purposes Only      Amphetamine Screen, Urine 07/29/2018 Neg  Negative <1000ng/mL Final    Barbiturate Screen, Ur 07/29/2018 Neg  Negative <200 ng/mL Final    Benzodiazepine Screen, Urine 0.0-1.5  (Smokers 1.5-5.0)      MetHgb, Shivam 07/30/2018 0.2  <1.5 % Final    TC02 (Calc), Shivam 07/30/2018 27  Not Established mmol/L Final    O2 Content, Shivam 07/30/2018 19  Not Established VOL % Final    O2 Therapy 07/30/2018 Unknown   Final    POC Glucose 07/30/2018 409* 70 - 99 mg/dl Final    Performed on 07/30/2018 ACCU-CHEK   Final    POC Glucose 07/30/2018 231* 70 - 99 mg/dl Final    Performed on 07/30/2018 ACCU-CHEK   Final    Sodium 07/30/2018 130* 136 - 145 mmol/L Final    Potassium 07/30/2018 3.8  3.5 - 5.1 mmol/L Final    Chloride 07/30/2018 101  99 - 110 mmol/L Final    CO2 07/30/2018 20* 21 - 32 mmol/L Final    Anion Gap 07/30/2018 9  3 - 16 Final    Glucose 07/30/2018 348* 70 - 99 mg/dL Final    BUN 07/30/2018 27* 7 - 20 mg/dL Final    CREATININE 07/30/2018 0.9  0.9 - 1.3 mg/dL Final    GFR Non- 07/30/2018 >60  >60 Final    Comment: >60 mL/min/1.73m2 EGFR, calc. for ages 25 and older using the  MDRD formula (not corrected for weight), is valid for stable  renal function.  GFR  07/30/2018 >60  >60 Final    Comment: Chronic Kidney Disease: less than 60 ml/min/1.73 sq.m. Kidney Failure: less than 15 ml/min/1.73 sq.m. Results valid for patients 18 years and older.       Calcium 07/30/2018 8.6  8.3 - 10.6 mg/dL Final    Total Protein 07/30/2018 5.6* 6.4 - 8.2 g/dL Final    Alb 07/30/2018 2.5* 3.4 - 5.0 g/dL Final    Albumin/Globulin Ratio 07/30/2018 0.8* 1.1 - 2.2 Final    Total Bilirubin 07/30/2018 <0.2  0.0 - 1.0 mg/dL Final    Alkaline Phosphatase 07/30/2018 94  40 - 129 U/L Final    ALT 07/30/2018 10  10 - 40 U/L Final    AST 07/30/2018 11* 15 - 37 U/L Final    Globulin 07/30/2018 3.1  g/dL Final    Color, UA 07/29/2018 Yellow  Straw/Yellow Final    Clarity, UA 07/29/2018 Clear  Clear Final    Glucose, Ur 07/29/2018 >=1000* Negative mg/dL Final    Bilirubin Urine 07/29/2018 Negative  Negative Final    Ketones, Urine 07/29/2018

## 2018-08-02 NOTE — PLAN OF CARE
Problem: Altered Mood, Depressive Behavior:  Goal: Able to verbalize and/or display a decrease in depressive symptoms  Able to verbalize and/or display a decrease in depressive symptoms   Outcome: Not Met This Shift  Pt has been mostly isolative to room, little socialization when out. Attended group. Did laundry. Pleasant but blunted. Denied SI/HI. Denied hallucinations. Talked abt feeling useless. 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 He and wife are . He reported that has no kidney function but is not on dialysis. Wanted to know if kidney Dr could see him while here-referred to Dr for answer. He is homeless. Hopes to get into [facility], then he will be assigned a . All his Drs and mental health tx took place in [facility] (his last residence) but has no . Cont' to be depressed.

## 2018-08-03 LAB
GLUCOSE BLD-MCNC: 184 MG/DL (ref 70–99)
GLUCOSE BLD-MCNC: 337 MG/DL (ref 70–99)
GLUCOSE BLD-MCNC: 351 MG/DL (ref 70–99)
GLUCOSE BLD-MCNC: 453 MG/DL (ref 70–99)
PERFORMED ON: ABNORMAL

## 2018-08-03 PROCEDURE — 1240000000 HC EMOTIONAL WELLNESS R&B

## 2018-08-03 PROCEDURE — 99233 SBSQ HOSP IP/OBS HIGH 50: CPT | Performed by: PSYCHIATRY & NEUROLOGY

## 2018-08-03 PROCEDURE — 6370000000 HC RX 637 (ALT 250 FOR IP): Performed by: PSYCHIATRY & NEUROLOGY

## 2018-08-03 PROCEDURE — 6370000000 HC RX 637 (ALT 250 FOR IP): Performed by: PHYSICIAN ASSISTANT

## 2018-08-03 RX ADMIN — ASPIRIN 81 MG CHEWABLE TABLET 81 MG: 81 TABLET CHEWABLE at 08:48

## 2018-08-03 RX ADMIN — INSULIN LISPRO 2 UNITS: 100 INJECTION, SOLUTION INTRAVENOUS; SUBCUTANEOUS at 12:26

## 2018-08-03 RX ADMIN — FAMOTIDINE 40 MG: 20 TABLET ORAL at 21:32

## 2018-08-03 RX ADMIN — INSULIN GLARGINE 50 UNITS: 100 INJECTION, SOLUTION SUBCUTANEOUS at 21:40

## 2018-08-03 RX ADMIN — INSULIN LISPRO 10 UNITS: 100 INJECTION, SOLUTION INTRAVENOUS; SUBCUTANEOUS at 08:22

## 2018-08-03 RX ADMIN — BUPROPION HYDROCHLORIDE 150 MG: 150 TABLET, FILM COATED, EXTENDED RELEASE ORAL at 08:48

## 2018-08-03 RX ADMIN — PANTOPRAZOLE SODIUM 40 MG: 40 TABLET, DELAYED RELEASE ORAL at 08:47

## 2018-08-03 RX ADMIN — FENOFIBRATE 160 MG: 160 TABLET ORAL at 08:48

## 2018-08-03 RX ADMIN — INSULIN LISPRO 4 UNITS: 100 INJECTION, SOLUTION INTRAVENOUS; SUBCUTANEOUS at 21:38

## 2018-08-03 RX ADMIN — INSULIN LISPRO 12 UNITS: 100 INJECTION, SOLUTION INTRAVENOUS; SUBCUTANEOUS at 17:12

## 2018-08-03 RX ADMIN — QUETIAPINE FUMARATE 50 MG: 25 TABLET ORAL at 21:32

## 2018-08-03 RX ADMIN — LOSARTAN POTASSIUM 50 MG: 25 TABLET, FILM COATED ORAL at 21:32

## 2018-08-03 RX ADMIN — LOSARTAN POTASSIUM 50 MG: 25 TABLET, FILM COATED ORAL at 08:48

## 2018-08-03 NOTE — PLAN OF CARE
[facility]  Day 3 Interdisciplinary Treatment Plan NOTE    Review Date & Time: 8/3/18 1026    Patient was not in treatment team    Admission Type:   Admission Type: Voluntary    Reason for admission:  Reason for Admission: SI with thoughts of overdosing on medications, noncompliance with Diabetic Medications  Estimated Length of Stay Update:  1-2 days  Estimated Discharge Date Update: 8/4/18    PATIENT STRENGTHS:  Patient Strengths Strengths: Communication, Connection to output provider  Patient Strengths and Limitations:Limitations: Tendency to isolate self, Lacks leisure interests  Addictive Behavior:Addictive Behavior  In the past 3 months, have you felt or has someone told you that you have a problem with:  : None  Do you have a history of Chemical Use?: Yes  Do you have a history of Alcohol Use?: No  Do you have a history of Street Drug Abuse?: Yes  Histroy of Prescripton Drug Abuse?: Yes  Medical Problems:  Past Medical History:   Diagnosis Date    Back pain     Chronic    Bipolar 1 disorder ([facility] 2/13    Chest pain     Secondary to GERD    Chronic kidney disease     CKD (chronic kidney disease)     DDD (degenerative disc disease), lumbar 2013    + MRI - no narcotics from this office    Depression, endogenous ([facility] [address]     suicide attempt 10/11, [facility]; [facility]    Diabetes mellitus ([facility] [address] [facility] 1998    GERD (gastroesophageal reflux disease)     Hepatitis C 11/29/2017    + RNA by pcr    Hepatitis C antibody positive in blood 11/27/2017    AB +    Hyperlipidemia         Hypertension     Hypotestosteronism 1/12    Treatment not effective per Dr Rivero    Marijuana use     Narcotic abuse     Opiate abuse, continuous     Proteinuria     Biopsy proven diabetic nephropathy    Vitamin D deficiency 6/25/2013       Risk:  Fall RiskTotal: 55  Vinod Scale Vinod Scale Score: 22  BVC Total: 0  Change in scores none.  Changes to plan of Care  none    Status EXAM:   Status and

## 2018-08-03 NOTE — PLAN OF CARE
Problem: Altered Mood, Deterioration in Function:  Goal: Maintenance of adequate nutrition will improve  Maintenance of adequate nutrition will improve   Outcome: Ongoing  Pt isolates in his room. He takes his meal trays to his room to eat,  typically eating 75% or more. Denies suicidal ideations but does admit he is still depressed. Blunted affect noted. Pt administers his own insulin. Pleasant during interaction.

## 2018-08-03 NOTE — PROGRESS NOTES
Department of Psychiatry  Attending Progress Note  Chief Complaint: follow-up Florence Martinez is in bed frequently. He is Not having SI at this time but appears unmotivated and somewhat malingering. Wants to go to [facility] in [facility]. Seroquel 50 mg HS. Sleep poor. Patient's chart was reviewed and collaborated with  about the treatment plan. SUBJECTIVE:    Patient is feeling better. Suicidal ideation:  denies suicidal ideation. Patient does not have medication side effects. ROS: Patient has new complaints: no  Sleeping adequately:  No:    Appetite adequate: Yes  Attending groups: No:   Visitors:No    OBJECTIVE    Physical  VITALS:  BP (!) 158/69   Pulse 60   Temp 98.1 °F (36.7 °C) (Oral)   Resp 16   Ht 5' 10\" (1.778 m)   Wt 140 lb (63.5 kg)   SpO2 99%   BMI 20.09 kg/m²     Mental Status Examination:  Patients appearance was street clothes. Thoughts are Happy. Homicidal ideations none. No abnormal movements, tics or mannerisms. Memory intact Aims 0. Concentration Good. Alert and oriented X 4. Insight and Judgement impaired insight. Patient was cooperative.  Patient gait normal. Mood constricted, affect depressed affect Hallucinations Absent, suicidal ideations no specific plan to harm self Speech normal volume  Data  Labs:   Admission on 07/29/2018   Component Date Value Ref Range Status    WBC 07/29/2018 10.1  4.0 - 11.0 K/uL Final    RBC 07/29/2018 5.19  4.20 - 5.90 M/uL Final    Hemoglobin 07/29/2018 14.5  13.5 - 17.5 g/dL Final    Hematocrit 07/29/2018 43.2  40.5 - 52.5 % Final    MCV 07/29/2018 83.2  80.0 - 100.0 fL Final    MCH 07/29/2018 27.9  26.0 - 34.0 pg Final    MCHC 07/29/2018 33.5  31.0 - 36.0 g/dL Final    RDW 07/29/2018 13.2  12.4 - 15.4 % Final    Platelets 41/74/2755 277  135 - 450 K/uL Final    MPV 07/29/2018 9.4  5.0 - 10.5 fL Final    Neutrophils % 07/29/2018 73.3  % Final    Lymphocytes % 07/29/2018 13.7  % Final    Monocytes % 07/29/2018 8.0  % Final  Eosinophils % 07/29/2018 3.9  % Final    Basophils % 07/29/2018 1.1  % Final    Neutrophils # 07/29/2018 7.4  1.7 - 7.7 K/uL Final    Lymphocytes # 07/29/2018 1.4  1.0 - 5.1 K/uL Final    Monocytes # 07/29/2018 0.8  0.0 - 1.3 K/uL Final    Eosinophils # 07/29/2018 0.4  0.0 - 0.6 K/uL Final    Basophils # 07/29/2018 0.1  0.0 - 0.2 K/uL Final    Sodium 07/29/2018 127* 136 - 145 mmol/L Final    Potassium 07/29/2018 4.3  3.5 - 5.1 mmol/L Final    Chloride 07/29/2018 90* 99 - 110 mmol/L Final    CO2 07/29/2018 24  21 - 32 mmol/L Final    Anion Gap 07/29/2018 13  3 - 16 Final    Glucose 07/29/2018 824* 70 - 99 mg/dL Final    BUN 07/29/2018 31* 7 - 20 mg/dL Final    CREATININE 07/29/2018 1.4* 0.9 - 1.3 mg/dL Final    GFR Non- 07/29/2018 55* >60 Final    Comment: >60 mL/min/1.73m2 EGFR, calc. for ages 25 and older using the  MDRD formula (not corrected for weight), is valid for stable  renal function.  GFR  07/29/2018 >60  >60 Final    Comment: Chronic Kidney Disease: less than 60 ml/min/1.73 sq.m. Kidney Failure: less than 15 ml/min/1.73 sq.m. Results valid for patients 18 years and older.       Calcium 07/29/2018 9.7  8.3 - 10.6 mg/dL Final    Total Protein 07/29/2018 7.0  6.4 - 8.2 g/dL Final    Alb 07/29/2018 3.3* 3.4 - 5.0 g/dL Final    Albumin/Globulin Ratio 07/29/2018 0.9* 1.1 - 2.2 Final    Total Bilirubin 07/29/2018 <0.2  0.0 - 1.0 mg/dL Final    Alkaline Phosphatase 07/29/2018 151* 40 - 129 U/L Final    ALT 07/29/2018 9* 10 - 40 U/L Final    AST 07/29/2018 13* 15 - 37 U/L Final    Globulin 07/29/2018 3.7  g/dL Final    Ethanol Lvl 07/29/2018 None Detected  mg/dL Final    Comment:    None Detected  Conversion factor:  100 mg/dl = .100 g/dl  For Medical Purposes Only      Amphetamine Screen, Urine 07/29/2018 Neg  Negative <1000ng/mL Final    Barbiturate Screen, Ur 07/29/2018 Neg  Negative <200 ng/mL Final    Benzodiazepine Screen, Urine 07/29/2018 Neg  Negative <200 ng/mL Final    Cannabinoid Scrn, Ur 07/29/2018 POSITIVE* Negative <50 ng/mL Final    Cocaine Metabolite Screen, Urine 07/29/2018 Neg  Negative <300 ng/mL Final    Opiate Scrn, Ur 07/29/2018 Neg  Negative <300 ng/mL Final    Comment: \"Therapeutic levels of pain medication, especially oxycontin and synthetic  opioids, may not be detected by this Methodology. Pain management screen  panel  Drug panel-PM-Hi Res Ur, Interp (PAIN) should be considered for drug  monitoring \".  PCP Screen, Urine 07/29/2018 Neg  Negative <25 ng/mL Final    Methadone Screen, Urine 07/29/2018 Neg  Negative <300 ng/mL Final    Propoxyphene Scrn, Ur 07/29/2018 Neg  Negative <300 ng/mL Final    pH, UA 07/29/2018 5.0   Final    Comment: Urine pH less than 5.0 or greater than 8.0 may indicate sample adulteration. Another sample should be collected if clinically  indicated.  Drug Screen Comment: 07/29/2018 see below   Final    Comment: This method is a screening test to detect only these drug  classes as part of a medical workup. Confirmatory testing  by another method should be ordered if clinically indicated.       Oxycodone Urine 07/29/2018 Neg  Negative <100 ng/ml Final    Salicylate, Serum 20/83/3252 <0.3* 15.0 - 30.0 mg/dL Final    Comment: Therapeutic Range: 15.0-30.0 mg/dL  Toxic: >30.0 mg/dL      Acetaminophen Level 07/29/2018 <5* 10 - 30 ug/mL Final    Comment: Therapeutic Range: 10.0-30.0 ug/mL  Toxic: >200.0 ug/mL      Beta-Hydroxybutyrate 07/29/2018 0.10  0.00 - 0.27 mmol/L Final    pH, Shivam 07/30/2018 7.389  7.350 - 7.450 Final    pCO2, Shivam 07/30/2018 42.7  40.0 - 50.0 mmHg Final    pO2, Shivam 07/30/2018 69.2* 25.0 - 40.0 mmHg Final    HCO3, Venous 07/30/2018 25.2  23.0 - 29.0 mmol/L Final    Base Excess, Shivam 07/30/2018 0.1  -3.0 - 3.0 mmol/L Final    O2 Sat, Shivam 07/30/2018 94  Not Established % Final    Carboxyhemoglobin 07/30/2018 7.1* 0.0 - 1.5 % Final    Comment: 0.0-1.5  (Smokers 1.5-5.0)      MetHgb, Shivam 07/30/2018 0.2  <1.5 % Final    TC02 (Calc), Shivam 07/30/2018 27  Not Established mmol/L Final    O2 Content, Shivam 07/30/2018 19  Not Established VOL % Final    O2 Therapy 07/30/2018 Unknown   Final    POC Glucose 07/30/2018 409* 70 - 99 mg/dl Final    Performed on 07/30/2018 ACCU-CHEK   Final    POC Glucose 07/30/2018 231* 70 - 99 mg/dl Final    Performed on 07/30/2018 ACCU-CHEK   Final    Sodium 07/30/2018 130* 136 - 145 mmol/L Final    Potassium 07/30/2018 3.8  3.5 - 5.1 mmol/L Final    Chloride 07/30/2018 101  99 - 110 mmol/L Final    CO2 07/30/2018 20* 21 - 32 mmol/L Final    Anion Gap 07/30/2018 9  3 - 16 Final    Glucose 07/30/2018 348* 70 - 99 mg/dL Final    BUN 07/30/2018 27* 7 - 20 mg/dL Final    CREATININE 07/30/2018 0.9  0.9 - 1.3 mg/dL Final    GFR Non- 07/30/2018 >60  >60 Final    Comment: >60 mL/min/1.73m2 EGFR, calc. for ages 25 and older using the  MDRD formula (not corrected for weight), is valid for stable  renal function.  GFR  07/30/2018 >60  >60 Final    Comment: Chronic Kidney Disease: less than 60 ml/min/1.73 sq.m. Kidney Failure: less than 15 ml/min/1.73 sq.m. Results valid for patients 18 years and older.       Calcium 07/30/2018 8.6  8.3 - 10.6 mg/dL Final    Total Protein 07/30/2018 5.6* 6.4 - 8.2 g/dL Final    Alb 07/30/2018 2.5* 3.4 - 5.0 g/dL Final    Albumin/Globulin Ratio 07/30/2018 0.8* 1.1 - 2.2 Final    Total Bilirubin 07/30/2018 <0.2  0.0 - 1.0 mg/dL Final    Alkaline Phosphatase 07/30/2018 94  40 - 129 U/L Final    ALT 07/30/2018 10  10 - 40 U/L Final    AST 07/30/2018 11* 15 - 37 U/L Final    Globulin 07/30/2018 3.1  g/dL Final    Color, UA 07/29/2018 Yellow  Straw/Yellow Final    Clarity, UA 07/29/2018 Clear  Clear Final    Glucose, Ur 07/29/2018 >=1000* Negative mg/dL Final    Bilirubin Urine 07/29/2018 Negative  Negative Final    Ketones, Urine 07/29/2018 Negative  Negative mg/dL Final    Specific Gravity, UA 07/29/2018 <=1.005  1.005 - 1.030 Final    Blood, Urine 07/29/2018 Negative  Negative Final    pH, UA 07/29/2018 6.0  5.0 - 8.0 Final    Protein, UA 07/29/2018 TRACE* Negative mg/dL Final    Urobilinogen, Urine 07/29/2018 0.2  <2.0 E.U./dL Final    Nitrite, Urine 07/29/2018 Negative  Negative Final    Leukocyte Esterase, Urine 07/29/2018 Negative  Negative Final    Microscopic Examination 07/29/2018 YES   Final    Urine Type 07/29/2018 Not Specified   Final    WBC, UA 07/29/2018 None seen  0 - 5 /HPF Final    RBC, UA 07/29/2018 None seen  0 - 2 /HPF Final    Glucose 07/30/2018 324  mg/dL Final    QC OK? 07/30/2018 yes   Final    POC Glucose 07/30/2018 324* 70 - 99 mg/dl Final    Performed on 07/30/2018 ACCU-CHEK   Final    Hemoglobin A1C 07/30/2018 13.6  See comment % Final    Comment: Comment:  Diagnosis of Diabetes: > or = 6.5%  Increased risk of diabetes (Prediabetes): 5.7-6.4%  Glycemic Control: Nonpregnant Adults: <7.0%                    Pregnant: <6.0%        eAG 07/30/2018 343.6  mg/dL Final    POC Glucose 07/30/2018 252* 70 - 99 mg/dl Final    Performed on 07/30/2018 ACCU-CHEK   Final    Sodium 07/31/2018 135* 136 - 145 mmol/L Final    Potassium 07/31/2018 4.2  3.5 - 5.1 mmol/L Final    Chloride 07/31/2018 104  99 - 110 mmol/L Final    CO2 07/31/2018 25  21 - 32 mmol/L Final    Anion Gap 07/31/2018 6  3 - 16 Final    Glucose 07/31/2018 266* 70 - 99 mg/dL Final    BUN 07/31/2018 21* 7 - 20 mg/dL Final    CREATININE 07/31/2018 1.0  0.9 - 1.3 mg/dL Final    GFR Non- 07/31/2018 >60  >60 Final    Comment: >60 mL/min/1.73m2 EGFR, calc. for ages 25 and older using the  MDRD formula (not corrected for weight), is valid for stable  renal function.  GFR  07/31/2018 >60  >60 Final    Comment: Chronic Kidney Disease: less than 60 ml/min/1.73 sq.m.           Kidney Failure: less than 15 ml/min/1.73

## 2018-08-03 NOTE — PLAN OF CARE
Exam  Normal: No  Facial Expression: Flat, Sad  Affect: Blunt  Level of Consciousness: Alert  Mood:Normal: No  Mood: Depressed, Sad  Motor Activity:Normal: No  Motor Activity: Decreased  Interview Behavior: Cooperative  Preception: [place] to Person, [place] to Time, [place] to Place, [place] to Situation  Attention:Normal: Yes  Attention: Distractible  Thought Processes: Circumstantial  Thought Content:Normal: Yes  Thought Content: Poverty of Content  Hallucinations: None  Delusions: No  Memory:Normal: No  Memory: Poor Recent  Insight and Judgment: No  Insight and Judgment: Poor Judgment, Poor Insight  Present Suicidal Ideation: No  Present Homicidal Ideation: No    Daily Assessment Last Entry:   Daily Sleep (WDL): Exceptions to WDL (interrupted sleep)         Patient Currently in Pain: No  Daily Nutrition (WDL): Within Defined Limits    Patient Monitoring:  Frequency of Checks: 4 times per hour, close    Psychiatric Symptoms:   Depression Symptoms  Depression Symptoms: Isolative, Loss of interest, Feelings of hopelessess, Feelings of helplessness  Anxiety Symptoms  Anxiety Symptoms: Generalized  Parvin Symptoms  Parvin Symptoms: Poor judgment     Psychosis Symptoms  Delusion Type: No problems reported or observed. Suicide Risk CSSR-S:  1) Within the past month, have you wished you were dead or wished you could go to sleep and not wake up? : YES  2) Within the past month, have you actually had any thoughts of killing yourself? : YES  3) Within the past month, have you been thinking about how you might kill yourself? : NO  5) Within the past month, have you started to work out or worked out the details of how to kill yourself?  Do you intend to carry out this plan? : NO  6)  Have you ever done anything, started to do anything, or prepared to do anything to end your life?: NO  Change in Result none Change in Plan of care none      EDUCATION:   Learner Progress Toward Treatment Goals: Reviewed goals and plan of

## 2018-08-04 VITALS
SYSTOLIC BLOOD PRESSURE: 140 MMHG | RESPIRATION RATE: 16 BRPM | TEMPERATURE: 98.8 F | BODY MASS INDEX: 20.04 KG/M2 | OXYGEN SATURATION: 99 % | DIASTOLIC BLOOD PRESSURE: 73 MMHG | WEIGHT: 140 LBS | HEART RATE: 64 BPM | HEIGHT: 70 IN

## 2018-08-04 LAB
GLUCOSE BLD-MCNC: 205 MG/DL (ref 70–99)
GLUCOSE BLD-MCNC: 376 MG/DL (ref 70–99)
PERFORMED ON: ABNORMAL
PERFORMED ON: ABNORMAL

## 2018-08-04 PROCEDURE — 5130000000 HC BRIDGE APPOINTMENT

## 2018-08-04 PROCEDURE — 6370000000 HC RX 637 (ALT 250 FOR IP): Performed by: PSYCHIATRY & NEUROLOGY

## 2018-08-04 PROCEDURE — 6370000000 HC RX 637 (ALT 250 FOR IP): Performed by: PHYSICIAN ASSISTANT

## 2018-08-04 PROCEDURE — 99239 HOSP IP/OBS DSCHRG MGMT >30: CPT | Performed by: PSYCHIATRY & NEUROLOGY

## 2018-08-04 PROCEDURE — 5130000000 HC BRIDGE APPOINTMENT: Performed by: PSYCHIATRY & NEUROLOGY

## 2018-08-04 RX ORDER — BUPROPION HYDROCHLORIDE 150 MG/1
150 TABLET ORAL DAILY
Qty: 30 TABLET | Refills: 0 | Status: ON HOLD | OUTPATIENT
Start: 2018-08-05 | End: 2020-03-24 | Stop reason: SDUPTHER

## 2018-08-04 RX ORDER — PANTOPRAZOLE SODIUM 40 MG/1
40 TABLET, DELAYED RELEASE ORAL DAILY
Qty: 30 TABLET | Refills: 0 | Status: ON HOLD | OUTPATIENT
Start: 2018-08-05 | End: 2020-03-24 | Stop reason: SDUPTHER

## 2018-08-04 RX ORDER — LOSARTAN POTASSIUM 50 MG/1
50 TABLET ORAL 2 TIMES DAILY
Qty: 60 TABLET | Refills: 0 | Status: SHIPPED | OUTPATIENT
Start: 2018-08-04 | End: 2018-08-06 | Stop reason: SDUPTHER

## 2018-08-04 RX ORDER — LANCETS 28 GAUGE
1 EACH MISCELLANEOUS 4 TIMES DAILY
Qty: 150 EACH | Refills: 1 | Status: ON HOLD | OUTPATIENT
Start: 2018-08-04 | End: 2020-03-24 | Stop reason: SDUPTHER

## 2018-08-04 RX ORDER — FAMOTIDINE 40 MG/1
40 TABLET, FILM COATED ORAL NIGHTLY
Qty: 30 TABLET | Refills: 0 | Status: SHIPPED | OUTPATIENT
Start: 2018-08-04 | End: 2019-11-14

## 2018-08-04 RX ORDER — NICOTINE 21 MG/24HR
1 PATCH, TRANSDERMAL 24 HOURS TRANSDERMAL DAILY
Qty: 30 PATCH | Refills: 3 | Status: ON HOLD | OUTPATIENT
Start: 2018-08-05 | End: 2020-03-24 | Stop reason: SDUPTHER

## 2018-08-04 RX ORDER — BLOOD-GLUCOSE METER
1 KIT MISCELLANEOUS 4 TIMES DAILY
Qty: 1 KIT | Refills: 0 | Status: ON HOLD | OUTPATIENT
Start: 2018-08-04 | End: 2020-03-24 | Stop reason: SDUPTHER

## 2018-08-04 RX ORDER — QUETIAPINE FUMARATE 50 MG/1
50 TABLET, FILM COATED ORAL NIGHTLY
Qty: 30 TABLET | Refills: 1 | Status: ON HOLD | OUTPATIENT
Start: 2018-08-04 | End: 2020-03-24 | Stop reason: SDUPTHER

## 2018-08-04 RX ORDER — ASPIRIN 81 MG/1
81 TABLET, CHEWABLE ORAL DAILY
Qty: 30 TABLET | Refills: 0 | Status: SHIPPED | OUTPATIENT
Start: 2018-08-05 | End: 2018-08-06

## 2018-08-04 RX ORDER — INSULIN GLARGINE 100 [IU]/ML
50 INJECTION, SOLUTION SUBCUTANEOUS NIGHTLY
Qty: 1 VIAL | Refills: 1 | Status: SHIPPED | OUTPATIENT
Start: 2018-08-04 | End: 2019-11-14

## 2018-08-04 RX ORDER — FENOFIBRATE 160 MG/1
160 TABLET ORAL DAILY
Qty: 30 TABLET | Refills: 0 | Status: ON HOLD | OUTPATIENT
Start: 2018-08-05 | End: 2020-03-23 | Stop reason: HOSPADM

## 2018-08-04 RX ORDER — BLOOD PRESSURE TEST KIT
1 KIT MISCELLANEOUS 4 TIMES DAILY
Qty: 150 EACH | Refills: 1 | Status: ON HOLD | OUTPATIENT
Start: 2018-08-04 | End: 2020-03-24 | Stop reason: SDUPTHER

## 2018-08-04 RX ADMIN — ASPIRIN 81 MG CHEWABLE TABLET 81 MG: 81 TABLET CHEWABLE at 08:33

## 2018-08-04 RX ADMIN — PANTOPRAZOLE SODIUM 40 MG: 40 TABLET, DELAYED RELEASE ORAL at 08:33

## 2018-08-04 RX ADMIN — FENOFIBRATE 160 MG: 160 TABLET ORAL at 08:32

## 2018-08-04 RX ADMIN — BUPROPION HYDROCHLORIDE 150 MG: 150 TABLET, FILM COATED, EXTENDED RELEASE ORAL at 08:33

## 2018-08-04 RX ADMIN — LOSARTAN POTASSIUM 50 MG: 25 TABLET, FILM COATED ORAL at 08:33

## 2018-08-04 RX ADMIN — INSULIN LISPRO 4 UNITS: 100 INJECTION, SOLUTION INTRAVENOUS; SUBCUTANEOUS at 11:51

## 2018-08-04 RX ADMIN — INSULIN LISPRO 10 UNITS: 100 INJECTION, SOLUTION INTRAVENOUS; SUBCUTANEOUS at 08:35

## 2018-08-04 NOTE — BH NOTE
[facility]  Discharge Note    Pt discharged with followings belongings:   Dentures: Uppers  Vision - Corrective Lenses: None  Hearing Aid: None  Jewelry: None  Body Piercings Removed: N/A  Clothing: Footwear, Pants, Shirt  Were All Patient Medications Collected?: Not Applicable  Other Valuables: Cell phone, [address] sent home with pt.  and returned to patient. Patient left department with Brother via personal vehicle, discharged to Home. Patient education on aftercare instructions: completed   Patient verbalize understanding of AVS:  Yes.     Status EXAM upon discharge:  Status and Exam  Normal: No  Facial Expression: Expressionless, Flat  Affect: Blunt  Level of Consciousness: Alert  Mood:Normal: No  Mood: Depressed, Ambivalent  Motor Activity:Normal: No  Motor Activity: Decreased  Interview Behavior: Cooperative  Preception: [place] to Time, [place] to Place, [place] to Situation, [place] to Person  Attention:Normal: No  Attention: Distractible  Thought Processes: Circumstantial  Thought Content:Normal: Yes  Thought Content: Poverty of Content  Hallucinations: None  Delusions: No  Memory:Normal: No  Memory: Poor Recent  Insight and Judgment: No  Insight and Judgment: Poor Judgment, Poor Insight  Present Suicidal Ideation: No  Present Homicidal Ideation: No    Niels Hagan, RN
All social service assessments are complete. Patient reports he still has fleeting thoughts of wanting to harm himself. Patient reports he is in constant physical pain. Patient states he would come to staff if he could not control his suicidal feelings. Patient denies homicidal feelings. Patient is active with [facility] in [facility]. April is pts therapist.  Pt goes to Lyndon Krabbe NP at [facility] in [facility]. Patient states he is homeless and has no family support  Patient is on parole for violating a protection order. Patient states he wants to get his wife and children back.   Shelter list has been given to pt
Comments: + interactions, + contribution, and + engagement.   Accomplished goal     Time: 2015-2045      Type of Group: [employer]      Level of Participation: Active participant
Comments: + interactions, + contribution, and + engagement.   Accomplished goal    Time: 2015-2045      Type of Group: [employer]      Level of Participation: Active participant
Situation  Attention:Normal: Yes  Thought Content:Normal: Yes  Hallucinations: None  Delusions: No  Memory:Normal: Yes  Insight and Judgment: No  Insight and Judgment: Poor Judgment, Poor Insight  Present Suicidal Ideation: Yes  Present Homicidal Ideation: No    Tobacco Screening:  Practical Counseling, on admission, duncan X, if applicable and completed (first 3 are required if patient doesn't refuse):            ( )  Recognizing danger situations (included triggers and roadblocks)                    ( )  Coping skills (new ways to manage stress, exercise, relaxation techniques, changing routine, distraction)                                                           ( )  Basic information about quitting (benefits of quitting, techniques in how to quit, available resources  ( ) Referral for counseling faxed to [facility]                                           ( ) Patient refused counseling  ( ) Patient has not smoked in the last 30 days    Metabolic Screening:    Lab Results   Component Value Date    LABA1C 15.3 11/27/2017       No results for input(s): CHOL, TRIG, HDL, LDLCALC, LABVLDL in the last 72 hours. Body mass index is 20.09 kg/m². BP Readings from Last 2 Encounters:   07/30/18 (!) 161/76   06/19/18 128/63           Pt admitted with followings belongings:  Dentures: None  Vision - Corrective Lenses: None  Hearing Aid: None  Jewelry: None  Body Piercings Removed: N/A  Clothing: Footwear, Pants, Shirt  Were All Patient Medications Collected?: Not Applicable  Other Valuables: Cell phone, [facility] sent home placed in locker. Patient's home medications were yes. Patient oriented to surroundings and program expectations and copy of patient rights given. Received admission packet:  yes. Consents reviewed, signed yes. Refused none. Patient verbalize understanding:  yes.     Patient education on precautions: yes                   Michael Malave RN

## 2018-08-06 ENCOUNTER — HOSPITAL ENCOUNTER (EMERGENCY)
Age: 48
Discharge: HOME | End: 2018-08-06
Attending: EMERGENCY MEDICINE
Payer: MEDICAID

## 2018-08-06 VITALS
SYSTOLIC BLOOD PRESSURE: 128 MMHG | HEART RATE: 76 BPM | DIASTOLIC BLOOD PRESSURE: 66 MMHG | RESPIRATION RATE: 16 BRPM | OXYGEN SATURATION: 99 % | TEMPERATURE: 98.3 F

## 2018-08-06 DIAGNOSIS — R45.851 SUICIDAL IDEATION: Primary | ICD-10-CM

## 2018-08-06 DIAGNOSIS — R73.9 HYPERGLYCEMIA: ICD-10-CM

## 2018-08-06 DIAGNOSIS — F34.1 DYSTHYMIA: ICD-10-CM

## 2018-08-06 LAB
ACETAMINOPHEN LEVEL: <5 UG/ML (ref 10–30)
ANION GAP SERPL CALCULATED.3IONS-SCNC: 13 MMOL/L (ref 3–16)
BANDED NEUTROPHILS RELATIVE PERCENT: 2 % (ref 0–7)
BASOPHILS ABSOLUTE: 0 K/UL (ref 0–0.2)
BASOPHILS RELATIVE PERCENT: 0 %
BUN BLDV-MCNC: 48 MG/DL (ref 7–20)
CALCIUM SERPL-MCNC: 9.7 MG/DL (ref 8.3–10.6)
CHLORIDE BLD-SCNC: 94 MMOL/L (ref 99–110)
CO2: 23 MMOL/L (ref 21–32)
CREAT SERPL-MCNC: 1.8 MG/DL (ref 0.9–1.3)
EOSINOPHILS ABSOLUTE: 0.1 K/UL (ref 0–0.6)
EOSINOPHILS RELATIVE PERCENT: 1 %
ETHANOL: NORMAL MG/DL (ref 0–0.08)
GFR AFRICAN AMERICAN: 49
GFR NON-AFRICAN AMERICAN: 41
GLUCOSE BLD-MCNC: 228 MG/DL (ref 70–99)
GLUCOSE BLD-MCNC: 499 MG/DL (ref 70–99)
GLUCOSE BLD-MCNC: 516 MG/DL (ref 70–99)
HCT VFR BLD CALC: 38.7 % (ref 40.5–52.5)
HEMOGLOBIN: 13.1 G/DL (ref 13.5–17.5)
LYMPHOCYTES ABSOLUTE: 2.7 K/UL (ref 1–5.1)
LYMPHOCYTES RELATIVE PERCENT: 22 %
MCH RBC QN AUTO: 28.4 PG (ref 26–34)
MCHC RBC AUTO-ENTMCNC: 33.9 G/DL (ref 31–36)
MCV RBC AUTO: 83.7 FL (ref 80–100)
MONOCYTES ABSOLUTE: 0.5 K/UL (ref 0–1.3)
MONOCYTES RELATIVE PERCENT: 4 %
NEUTROPHILS ABSOLUTE: 8.8 K/UL (ref 1.7–7.7)
NEUTROPHILS RELATIVE PERCENT: 71 %
PDW BLD-RTO: 13.9 % (ref 12.4–15.4)
PERFORMED ON: ABNORMAL
PERFORMED ON: ABNORMAL
PLATELET # BLD: 299 K/UL (ref 135–450)
PLATELET SLIDE REVIEW: ADEQUATE
PMV BLD AUTO: 9 FL (ref 5–10.5)
POTASSIUM SERPL-SCNC: 5.1 MMOL/L (ref 3.5–5.1)
RBC # BLD: 4.62 M/UL (ref 4.2–5.9)
SALICYLATE, SERUM: <0.3 MG/DL (ref 15–30)
SODIUM BLD-SCNC: 130 MMOL/L (ref 136–145)
WBC # BLD: 12.1 K/UL (ref 4–11)

## 2018-08-06 PROCEDURE — G0480 DRUG TEST DEF 1-7 CLASSES: HCPCS

## 2018-08-06 PROCEDURE — 96361 HYDRATE IV INFUSION ADD-ON: CPT

## 2018-08-06 PROCEDURE — 80048 BASIC METABOLIC PNL TOTAL CA: CPT

## 2018-08-06 PROCEDURE — 85025 COMPLETE CBC W/AUTO DIFF WBC: CPT

## 2018-08-06 PROCEDURE — 99284 EMERGENCY DEPT VISIT MOD MDM: CPT

## 2018-08-06 PROCEDURE — 96374 THER/PROPH/DIAG INJ IV PUSH: CPT

## 2018-08-06 PROCEDURE — 6370000000 HC RX 637 (ALT 250 FOR IP): Performed by: EMERGENCY MEDICINE

## 2018-08-06 PROCEDURE — 2580000003 HC RX 258: Performed by: EMERGENCY MEDICINE

## 2018-08-06 RX ORDER — IBUPROFEN 200 MG
1 TABLET ORAL DAILY
Qty: 100 EACH | Refills: 3 | Status: ON HOLD | OUTPATIENT
Start: 2018-08-06 | End: 2020-03-24 | Stop reason: SDUPTHER

## 2018-08-06 RX ORDER — 0.9 % SODIUM CHLORIDE 0.9 %
2000 INTRAVENOUS SOLUTION INTRAVENOUS ONCE
Status: COMPLETED | OUTPATIENT
Start: 2018-08-06 | End: 2018-08-06

## 2018-08-06 RX ADMIN — SODIUM CHLORIDE 2000 ML: 9 INJECTION, SOLUTION INTRAVENOUS at 05:12

## 2018-08-06 RX ADMIN — INSULIN HUMAN 10 UNITS: 100 INJECTION, SOLUTION PARENTERAL at 05:12

## 2018-08-06 NOTE — ED PROVIDER NOTES
Triage Chief Complaint:   Mental Health Problem (Patient to ED with complaints of being depressed. Patient states that he was just released from here on Saturday. )      [place]:  Felix Hernandez is a 41 y.o. male that presents  with suicidal ideation. Patient is just out of the hospital from a psychiatric evaluation with similar symptoms. He feels that he was discharged to soon and has continued thoughts of suicide. He plans to cut himself. He has attempted suicide on several previous occasions. Patient has a history of substance abuse in the past but states that he has not used drugs since 2015 with the exception of marijuana.         ROS:  Review of systems was reviewed for 10 systems and is otherwise negative except as in the [address]    Past Medical History:   Diagnosis Date    Back pain     Chronic    Bipolar 1 disorder ([address] 2/13    Chest pain     Secondary to GERD    Chronic kidney disease     CKD (chronic kidney disease)     DDD (degenerative disc disease), lumbar 2013    + MRI - no narcotics from this office    Depression, endogenous ([address]     suicide attempt 10/11, [facility]; [facility]    Diabetes mellitus ([address] 1998    GERD (gastroesophageal reflux disease)     Hepatitis C 11/29/2017    + RNA by pcr    Hepatitis C antibody positive in blood 11/27/2017    AB +    Hyperlipidemia     LDL 80    Hypertension     Hypotestosteronism 1/12    Treatment not effective per Dr Lucia Jimenez Marijuana use     Narcotic abuse     Opiate abuse, continuous     Proteinuria     Biopsy proven diabetic nephropathy    Vitamin D deficiency 6/25/2013     Past Surgical History:   Procedure Laterality Date    APPENDECTOMY  1985    BACK SURGERY  4/25/2013    Hemilaminectomy L5S1 right/Disectomy of Herniated L5S1 right    DENTAL SURGERY  2001    oral surgey    KIDNEY BIOPSY      KNEE ARTHROSCOPY      cyst removal    OTHER SURGICAL HISTORY Left 11/28/2017    left arm Irrigation and drainage    VASECTOMY

## 2018-08-06 NOTE — ED NOTES
Presenting Problem: Depressive Disorder    Appearance/Hygiene:  hospital attire, lying in bed, good grooming and good hygiene   Motor Behavior: WNL    Attitude: cooperative  Affect: depressed affect   Speech: normal pitch and normal volume  Mood: depressed and sad   Thought Processes: Goal directed, [place] and Logical  Perceptions: Absent   Thought content:  future oriented,  hopelessness   Suicidal ideation:  no specific plan to harm self   Homicidal ideation:  none  Orientation: A&Ox4   Memory: intact  Concentration: Fair    Insight/ judgement: Impaired insight and judgment      Psychosocial and contextual factors: Reports being homeless and unemployed. States \"I get my disability check now but my I made my wife my payee and we are  so she kept my back pay and my first two checks\". C-SSRS Summary (including current and past suicidal ideation, plan, intent, and attempts) : Reports attempting to hang self 6-7 years ago and most recent was 4 years ago per OD. Patient reported diagnosis Depression    Outpatient services/ Provider: Currently connected with [facility] in [place]. Next appointment is today, Aug. 6th 2018    Previous Inpatient Admissions( including location and dates if known): Reports admission to [facility] approximately 7 years ago after attempting to hang self and again last week, 7/29/18    Self-injurious/ Self-harm behavior:  Denies     History of violence:  Violation of a protection order and served time in senior living. Current Substance use: Reports marijuana use. Trauma identified:  Denies trauma or abuse.     Access to Firearms: Denies     ASSESSMENT FOR IMMINENT FUTURE DANGER:      RISK FACTORS:    []  Age <25 or >49   [x]  Male gender   [x]  Depressed mood   []  Active suicidal ideation   []  Suicide plan   []  Suicide attempt   []  Access to lethal means   [x]  Prior suicide attempt   []  Active substance abuse   [x]  Highly impulsive behaviors   [x]  Not attending to self-care/ADLs    []  Recent significant loss   [x]  Chronic pain or medical illness   []  Social isolation   [x]  History of violence   []  Active psychosis   []  Cognitive impairment    []  No outpatient services in place   [x]  Medication noncompliance   [x]  No collateral information to support safety   []      PROTECTIVE FACTORS:  [x] Age >25 and <55  [] Female gender   [] Denies depression  [x] Denies suicidal ideation  [x] Does not have lethal plan   [x] Does not have access to guns or weapons  [] Patient is verbally andrew for safety  [] No prior suicide attempts  [] No active substance abuse  [x] Patient has social or family support  [x] No active psychosis or cognitive dysfunction  [] Physically healthy  [x] Has outpatient services in place  [] Compliant with recommended medications  [] Collateral information from  supports patient safety   [] Patient is future oriented with plans to follow up at [facility]. []        Clinical Summary:    Ny Lawrence is a 41year old male who presents to ED voluntarily with complaints of depression and SI. Patient recently discharged from [facility] (18) and feels it was too soon. States \"I only got discharged on Sat. Because I knew it was my sons day off and I wanted to spend the day with him, but I probably shouldn't have\". Denies SI, HI, AVH at this time. States \"I don't want to kill myself or anything, I wouldn't do that to my kids, but if I , I would be ok with it\". Reports hx of two suicide attempts. First attempt per hanging in  and most recent,  per OD. Reports two past admissions to [address] [place] [address] ( and ). Currently homeless and receives disability. Currently connected with [facility]. Next appointment is today, 18. Patient was clinically sober at the time of the evaluation. Patient was evaluated and offered supportive counseling.                  Brannon Moore RN  [phone]

## 2019-11-14 ENCOUNTER — APPOINTMENT (OUTPATIENT)
Dept: GENERAL RADIOLOGY | Age: 49
End: 2019-11-14
Payer: MEDICAID

## 2019-11-14 ENCOUNTER — HOSPITAL ENCOUNTER (EMERGENCY)
Age: 49
Discharge: HOME | End: 2019-11-14
Attending: EMERGENCY MEDICINE
Payer: MEDICAID

## 2019-11-14 ENCOUNTER — APPOINTMENT (OUTPATIENT)
Dept: CT IMAGING | Age: 49
End: 2019-11-14
Payer: MEDICAID

## 2019-11-14 VITALS
RESPIRATION RATE: 22 BRPM | SYSTOLIC BLOOD PRESSURE: 146 MMHG | TEMPERATURE: 98.4 F | WEIGHT: 145 LBS | BODY MASS INDEX: 20.81 KG/M2 | DIASTOLIC BLOOD PRESSURE: 75 MMHG | HEART RATE: 92 BPM | OXYGEN SATURATION: 99 %

## 2019-11-14 DIAGNOSIS — R91.1 PULMONARY NODULE: ICD-10-CM

## 2019-11-14 DIAGNOSIS — R07.9 RIGHT-SIDED CHEST PAIN: Primary | ICD-10-CM

## 2019-11-14 DIAGNOSIS — J98.59 MEDIASTINAL MASS: ICD-10-CM

## 2019-11-14 DIAGNOSIS — E11.65 UNCONTROLLED TYPE 2 DIABETES MELLITUS WITH HYPERGLYCEMIA (HCC): ICD-10-CM

## 2019-11-14 DIAGNOSIS — L98.9 SKIN LESION OF LEFT UPPER EXTREMITY: ICD-10-CM

## 2019-11-14 LAB
A/G RATIO: 0.8 (ref 1.1–2.2)
ALBUMIN SERPL-MCNC: 3.4 G/DL (ref 3.4–5)
ALP BLD-CCNC: 152 U/L (ref 40–129)
ALT SERPL-CCNC: 23 U/L (ref 10–40)
AMPHETAMINE SCREEN, URINE: POSITIVE
ANION GAP SERPL CALCULATED.3IONS-SCNC: 11 MMOL/L (ref 3–16)
AST SERPL-CCNC: 19 U/L (ref 15–37)
BARBITURATE SCREEN URINE: ABNORMAL
BASOPHILS ABSOLUTE: 0.2 K/UL (ref 0–0.2)
BASOPHILS RELATIVE PERCENT: 1.8 %
BENZODIAZEPINE SCREEN, URINE: ABNORMAL
BILIRUB SERPL-MCNC: <0.2 MG/DL (ref 0–1)
BILIRUBIN URINE: NEGATIVE
BLOOD, URINE: NEGATIVE
BUN BLDV-MCNC: 29 MG/DL (ref 7–20)
CALCIUM SERPL-MCNC: 9.3 MG/DL (ref 8.3–10.6)
CANNABINOID SCREEN URINE: ABNORMAL
CHLORIDE BLD-SCNC: 93 MMOL/L (ref 99–110)
CLARITY: CLEAR
CO2: 21 MMOL/L (ref 21–32)
COCAINE METABOLITE SCREEN URINE: ABNORMAL
COLOR: YELLOW
CREAT SERPL-MCNC: 1.4 MG/DL (ref 0.9–1.3)
EOSINOPHILS ABSOLUTE: 0.5 K/UL (ref 0–0.6)
EOSINOPHILS RELATIVE PERCENT: 5.5 %
GFR AFRICAN AMERICAN: >60
GFR NON-AFRICAN AMERICAN: 54
GLOBULIN: 4.1 G/DL
GLUCOSE BLD-MCNC: 564 MG/DL
GLUCOSE BLD-MCNC: 564 MG/DL (ref 70–99)
GLUCOSE BLD-MCNC: 570 MG/DL (ref 70–99)
GLUCOSE BLD-MCNC: 577 MG/DL (ref 70–99)
GLUCOSE URINE: >=1000 MG/DL
HCT VFR BLD CALC: 45.4 % (ref 40.5–52.5)
HEMOGLOBIN: 15.2 G/DL (ref 13.5–17.5)
KETONES, URINE: NEGATIVE MG/DL
LACTIC ACID: 1.7 MMOL/L (ref 0.4–2)
LEUKOCYTE ESTERASE, URINE: NEGATIVE
LIPASE: 158 U/L (ref 13–60)
LYMPHOCYTES ABSOLUTE: 1.9 K/UL (ref 1–5.1)
LYMPHOCYTES RELATIVE PERCENT: 20.6 %
Lab: ABNORMAL
MCH RBC QN AUTO: 27.4 PG (ref 26–34)
MCHC RBC AUTO-ENTMCNC: 33.4 G/DL (ref 31–36)
MCV RBC AUTO: 82.1 FL (ref 80–100)
METHADONE SCREEN, URINE: ABNORMAL
MICROSCOPIC EXAMINATION: YES
MONOCYTES ABSOLUTE: 1 K/UL (ref 0–1.3)
MONOCYTES RELATIVE PERCENT: 10.9 %
NEUTROPHILS ABSOLUTE: 5.7 K/UL (ref 1.7–7.7)
NEUTROPHILS RELATIVE PERCENT: 61.2 %
NITRITE, URINE: NEGATIVE
OPIATE SCREEN URINE: ABNORMAL
OXYCODONE URINE: ABNORMAL
PDW BLD-RTO: 13.3 % (ref 12.4–15.4)
PERFORMED ON: ABNORMAL
PERFORMED ON: ABNORMAL
PH UA: 6
PH UA: 6 (ref 5–8)
PHENCYCLIDINE SCREEN URINE: ABNORMAL
PLATELET # BLD: 273 K/UL (ref 135–450)
PMV BLD AUTO: 9 FL (ref 5–10.5)
POTASSIUM SERPL-SCNC: 4.7 MMOL/L (ref 3.5–5.1)
POTASSIUM SERPL-SCNC: 5.8 MMOL/L (ref 3.5–5.1)
PROPOXYPHENE SCREEN: ABNORMAL
PROTEIN UA: 100 MG/DL
RBC # BLD: 5.54 M/UL (ref 4.2–5.9)
RBC UA: NORMAL /HPF (ref 0–2)
SEDIMENTATION RATE, ERYTHROCYTE: 47 MM/HR (ref 0–15)
SODIUM BLD-SCNC: 125 MMOL/L (ref 136–145)
SPECIFIC GRAVITY UA: 1.01 (ref 1–1.03)
TOTAL PROTEIN: 7.5 G/DL (ref 6.4–8.2)
TROPONIN: 0.01 NG/ML
TROPONIN: 0.02 NG/ML
URINE TYPE: ABNORMAL
UROBILINOGEN, URINE: 0.2 E.U./DL
WBC # BLD: 9.3 K/UL (ref 4–11)
WBC UA: NORMAL /HPF (ref 0–5)

## 2019-11-14 PROCEDURE — 74177 CT ABD & PELVIS W/CONTRAST: CPT

## 2019-11-14 PROCEDURE — 96374 THER/PROPH/DIAG INJ IV PUSH: CPT

## 2019-11-14 PROCEDURE — 81001 URINALYSIS AUTO W/SCOPE: CPT

## 2019-11-14 PROCEDURE — 99284 EMERGENCY DEPT VISIT MOD MDM: CPT

## 2019-11-14 PROCEDURE — 83690 ASSAY OF LIPASE: CPT

## 2019-11-14 PROCEDURE — 6370000000 HC RX 637 (ALT 250 FOR IP): Performed by: EMERGENCY MEDICINE

## 2019-11-14 PROCEDURE — 83605 ASSAY OF LACTIC ACID: CPT

## 2019-11-14 PROCEDURE — 93931 UPPER EXTREMITY STUDY: CPT

## 2019-11-14 PROCEDURE — 80053 COMPREHEN METABOLIC PANEL: CPT

## 2019-11-14 PROCEDURE — 85652 RBC SED RATE AUTOMATED: CPT

## 2019-11-14 PROCEDURE — 80307 DRUG TEST PRSMV CHEM ANLYZR: CPT

## 2019-11-14 PROCEDURE — 2580000003 HC RX 258: Performed by: EMERGENCY MEDICINE

## 2019-11-14 PROCEDURE — 96361 HYDRATE IV INFUSION ADD-ON: CPT

## 2019-11-14 PROCEDURE — 93005 ELECTROCARDIOGRAM TRACING: CPT | Performed by: EMERGENCY MEDICINE

## 2019-11-14 PROCEDURE — 71046 X-RAY EXAM CHEST 2 VIEWS: CPT

## 2019-11-14 PROCEDURE — 84132 ASSAY OF SERUM POTASSIUM: CPT

## 2019-11-14 PROCEDURE — 85025 COMPLETE CBC W/AUTO DIFF WBC: CPT

## 2019-11-14 PROCEDURE — 71260 CT THORAX DX C+: CPT

## 2019-11-14 PROCEDURE — 96375 TX/PRO/DX INJ NEW DRUG ADDON: CPT

## 2019-11-14 PROCEDURE — 6360000002 HC RX W HCPCS: Performed by: EMERGENCY MEDICINE

## 2019-11-14 PROCEDURE — 84484 ASSAY OF TROPONIN QUANT: CPT

## 2019-11-14 PROCEDURE — 6360000004 HC RX CONTRAST MEDICATION: Performed by: EMERGENCY MEDICINE

## 2019-11-14 RX ORDER — SODIUM CHLORIDE, SODIUM LACTATE, POTASSIUM CHLORIDE, AND CALCIUM CHLORIDE .6; .31; .03; .02 G/100ML; G/100ML; G/100ML; G/100ML
1000 INJECTION, SOLUTION INTRAVENOUS ONCE
Status: COMPLETED | OUTPATIENT
Start: 2019-11-14 | End: 2019-11-14

## 2019-11-14 RX ORDER — MORPHINE SULFATE 4 MG/ML
4 INJECTION, SOLUTION INTRAMUSCULAR; INTRAVENOUS ONCE
Status: COMPLETED | OUTPATIENT
Start: 2019-11-14 | End: 2019-11-14

## 2019-11-14 RX ORDER — DOXYCYCLINE 100 MG/1
100 TABLET ORAL 2 TIMES DAILY
Qty: 20 TABLET | Refills: 0 | Status: SHIPPED | OUTPATIENT
Start: 2019-11-14 | End: 2019-11-24

## 2019-11-14 RX ORDER — RANITIDINE 150 MG/1
150 TABLET ORAL 2 TIMES DAILY
Qty: 60 TABLET | Refills: 0 | Status: ON HOLD | OUTPATIENT
Start: 2019-11-14 | End: 2020-06-19 | Stop reason: HOSPADM

## 2019-11-14 RX ORDER — DOXYCYCLINE HYCLATE 100 MG
100 TABLET ORAL ONCE
Status: COMPLETED | OUTPATIENT
Start: 2019-11-14 | End: 2019-11-14

## 2019-11-14 RX ORDER — ONDANSETRON 2 MG/ML
4 INJECTION INTRAMUSCULAR; INTRAVENOUS ONCE
Status: COMPLETED | OUTPATIENT
Start: 2019-11-14 | End: 2019-11-14

## 2019-11-14 RX ADMIN — DOXYCYCLINE HYCLATE 100 MG: 100 TABLET, COATED ORAL at 15:49

## 2019-11-14 RX ADMIN — SODIUM CHLORIDE, POTASSIUM CHLORIDE, SODIUM LACTATE AND CALCIUM CHLORIDE 1000 ML: 600; 310; 30; 20 INJECTION, SOLUTION INTRAVENOUS at 12:22

## 2019-11-14 RX ADMIN — SODIUM CHLORIDE, POTASSIUM CHLORIDE, SODIUM LACTATE AND CALCIUM CHLORIDE 1000 ML: 600; 310; 30; 20 INJECTION, SOLUTION INTRAVENOUS at 15:52

## 2019-11-14 RX ADMIN — IOPAMIDOL 75 ML: 755 INJECTION, SOLUTION INTRAVENOUS at 12:50

## 2019-11-14 RX ADMIN — ONDANSETRON 4 MG: 2 INJECTION INTRAMUSCULAR; INTRAVENOUS at 12:04

## 2019-11-14 RX ADMIN — INSULIN HUMAN 6 UNITS: 100 INJECTION, SOLUTION PARENTERAL at 15:50

## 2019-11-14 RX ADMIN — MORPHINE SULFATE 4 MG: 4 INJECTION, SOLUTION INTRAMUSCULAR; INTRAVENOUS at 12:04

## 2019-11-14 ASSESSMENT — PAIN SCALES - GENERAL
PAINLEVEL_OUTOF10: 7
PAINLEVEL_OUTOF10: 8
PAINLEVEL_OUTOF10: 8

## 2019-11-14 ASSESSMENT — ENCOUNTER SYMPTOMS
SHORTNESS OF BREATH: 1
COLOR CHANGE: 0
ABDOMINAL PAIN: 1
VOMITING: 0
COUGH: 0
NAUSEA: 1
BACK PAIN: 1

## 2019-11-15 LAB
EKG ATRIAL RATE: 83 BPM
EKG DIAGNOSIS: NORMAL
EKG P AXIS: 72 DEGREES
EKG P-R INTERVAL: 134 MS
EKG Q-T INTERVAL: 366 MS
EKG QRS DURATION: 88 MS
EKG QTC CALCULATION (BAZETT): 430 MS
EKG R AXIS: 94 DEGREES
EKG T AXIS: 63 DEGREES
EKG VENTRICULAR RATE: 83 BPM

## 2019-11-15 PROCEDURE — 93010 ELECTROCARDIOGRAM REPORT: CPT | Performed by: INTERNAL MEDICINE

## 2020-03-21 ENCOUNTER — APPOINTMENT (OUTPATIENT)
Dept: GENERAL RADIOLOGY | Age: 50
End: 2020-03-21
Payer: MEDICAID

## 2020-03-21 ENCOUNTER — HOSPITAL ENCOUNTER (OUTPATIENT)
Age: 50
Setting detail: OBSERVATION
Discharge: HOME | End: 2020-03-25
Attending: EMERGENCY MEDICINE | Admitting: INTERNAL MEDICINE
Payer: MEDICAID

## 2020-03-21 PROBLEM — R07.9 CHEST PAIN: Status: ACTIVE | Noted: 2020-03-21

## 2020-03-21 LAB
AMPHETAMINE SCREEN, URINE: NORMAL
ANION GAP SERPL CALCULATED.3IONS-SCNC: 14 MMOL/L (ref 3–16)
BARBITURATE SCREEN URINE: NORMAL
BASE EXCESS VENOUS: -2.2 MMOL/L (ref -3–3)
BASOPHILS ABSOLUTE: 0.2 K/UL (ref 0–0.2)
BASOPHILS RELATIVE PERCENT: 1.8 %
BENZODIAZEPINE SCREEN, URINE: NORMAL
BILIRUBIN URINE: NEGATIVE
BLOOD, URINE: NEGATIVE
BUN BLDV-MCNC: 17 MG/DL (ref 7–20)
CALCIUM SERPL-MCNC: 8.3 MG/DL (ref 8.3–10.6)
CANNABINOID SCREEN URINE: NORMAL
CARBOXYHEMOGLOBIN: 6.2 % (ref 0–1.5)
CHLORIDE BLD-SCNC: 84 MMOL/L (ref 99–110)
CLARITY: CLEAR
CO2: 25 MMOL/L (ref 21–32)
COCAINE METABOLITE SCREEN URINE: NORMAL
COLOR: YELLOW
CREAT SERPL-MCNC: 1.4 MG/DL (ref 0.9–1.3)
EOSINOPHILS ABSOLUTE: 0.3 K/UL (ref 0–0.6)
EOSINOPHILS RELATIVE PERCENT: 3.3 %
GFR AFRICAN AMERICAN: >60
GFR NON-AFRICAN AMERICAN: 54
GLUCOSE BLD-MCNC: 327 MG/DL (ref 70–99)
GLUCOSE BLD-MCNC: 877 MG/DL (ref 70–99)
GLUCOSE BLD-MCNC: >600 MG/DL (ref 70–99)
GLUCOSE URINE: >=1000 MG/DL
HCO3 VENOUS: 24.2 MMOL/L (ref 23–29)
HCT VFR BLD CALC: 47.7 % (ref 40.5–52.5)
HEMOGLOBIN: 15.2 G/DL (ref 13.5–17.5)
KETONES, URINE: NEGATIVE MG/DL
LEUKOCYTE ESTERASE, URINE: NEGATIVE
LYMPHOCYTES ABSOLUTE: 1.3 K/UL (ref 1–5.1)
LYMPHOCYTES RELATIVE PERCENT: 12.7 %
Lab: NORMAL
MCH RBC QN AUTO: 26.7 PG (ref 26–34)
MCHC RBC AUTO-ENTMCNC: 32 G/DL (ref 31–36)
MCV RBC AUTO: 83.7 FL (ref 80–100)
METHADONE SCREEN, URINE: NORMAL
METHEMOGLOBIN VENOUS: 0.3 %
MICROSCOPIC EXAMINATION: YES
MONOCYTES ABSOLUTE: 0.8 K/UL (ref 0–1.3)
MONOCYTES RELATIVE PERCENT: 7.8 %
NEUTROPHILS ABSOLUTE: 7.8 K/UL (ref 1.7–7.7)
NEUTROPHILS RELATIVE PERCENT: 74.4 %
NITRITE, URINE: NEGATIVE
O2 CONTENT, VEN: 13 VOL %
O2 SAT, VEN: 48 %
O2 THERAPY: ABNORMAL
OPIATE SCREEN URINE: NORMAL
OXYCODONE URINE: NORMAL
PCO2, VEN: 47.3 MMHG (ref 40–50)
PDW BLD-RTO: 13.6 % (ref 12.4–15.4)
PERFORMED ON: ABNORMAL
PERFORMED ON: ABNORMAL
PH UA: 6.5
PH UA: 6.5 (ref 5–8)
PH VENOUS: 7.33 (ref 7.35–7.45)
PHENCYCLIDINE SCREEN URINE: NORMAL
PLATELET # BLD: 306 K/UL (ref 135–450)
PMV BLD AUTO: 8.9 FL (ref 5–10.5)
PO2, VEN: 28 MMHG (ref 25–40)
POTASSIUM REFLEX MAGNESIUM: 5.2 MMOL/L (ref 3.5–5.1)
PRO-BNP: 248 PG/ML (ref 0–124)
PROPOXYPHENE SCREEN: NORMAL
PROTEIN UA: 30 MG/DL
RBC # BLD: 5.69 M/UL (ref 4.2–5.9)
RBC UA: NORMAL /HPF (ref 0–4)
SODIUM BLD-SCNC: 123 MMOL/L (ref 136–145)
SPECIFIC GRAVITY UA: <=1.005 (ref 1–1.03)
TCO2 CALC VENOUS: 26 MMOL/L
TOTAL CK: 246 U/L (ref 39–308)
TROPONIN: <0.01 NG/ML
URINE REFLEX TO CULTURE: ABNORMAL
URINE TYPE: ABNORMAL
UROBILINOGEN, URINE: 0.2 E.U./DL
WBC # BLD: 10.5 K/UL (ref 4–11)
WBC UA: NORMAL /HPF (ref 0–5)

## 2020-03-21 PROCEDURE — 83036 HEMOGLOBIN GLYCOSYLATED A1C: CPT

## 2020-03-21 PROCEDURE — 99285 EMERGENCY DEPT VISIT HI MDM: CPT

## 2020-03-21 PROCEDURE — 84484 ASSAY OF TROPONIN QUANT: CPT

## 2020-03-21 PROCEDURE — 83880 ASSAY OF NATRIURETIC PEPTIDE: CPT

## 2020-03-21 PROCEDURE — 2580000003 HC RX 258: Performed by: EMERGENCY MEDICINE

## 2020-03-21 PROCEDURE — 82550 ASSAY OF CK (CPK): CPT

## 2020-03-21 PROCEDURE — 80307 DRUG TEST PRSMV CHEM ANLYZR: CPT

## 2020-03-21 PROCEDURE — 96361 HYDRATE IV INFUSION ADD-ON: CPT

## 2020-03-21 PROCEDURE — 96374 THER/PROPH/DIAG INJ IV PUSH: CPT

## 2020-03-21 PROCEDURE — 93005 ELECTROCARDIOGRAM TRACING: CPT | Performed by: EMERGENCY MEDICINE

## 2020-03-21 PROCEDURE — 2060000000 HC ICU INTERMEDIATE R&B

## 2020-03-21 PROCEDURE — 80048 BASIC METABOLIC PNL TOTAL CA: CPT

## 2020-03-21 PROCEDURE — 6370000000 HC RX 637 (ALT 250 FOR IP): Performed by: EMERGENCY MEDICINE

## 2020-03-21 PROCEDURE — 85025 COMPLETE CBC W/AUTO DIFF WBC: CPT

## 2020-03-21 PROCEDURE — 81001 URINALYSIS AUTO W/SCOPE: CPT

## 2020-03-21 PROCEDURE — 36415 COLL VENOUS BLD VENIPUNCTURE: CPT

## 2020-03-21 PROCEDURE — 82803 BLOOD GASES ANY COMBINATION: CPT

## 2020-03-21 PROCEDURE — 71045 X-RAY EXAM CHEST 1 VIEW: CPT

## 2020-03-21 PROCEDURE — 80076 HEPATIC FUNCTION PANEL: CPT

## 2020-03-21 PROCEDURE — G0378 HOSPITAL OBSERVATION PER HR: HCPCS

## 2020-03-21 RX ORDER — ATORVASTATIN CALCIUM 40 MG/1
40 TABLET, FILM COATED ORAL NIGHTLY
Status: DISCONTINUED | OUTPATIENT
Start: 2020-03-22 | End: 2020-03-25 | Stop reason: HOSPADM

## 2020-03-21 RX ORDER — PROMETHAZINE HYDROCHLORIDE 25 MG/1
12.5 TABLET ORAL EVERY 6 HOURS PRN
Status: DISCONTINUED | OUTPATIENT
Start: 2020-03-21 | End: 2020-03-25 | Stop reason: HOSPADM

## 2020-03-21 RX ORDER — LOSARTAN POTASSIUM 25 MG/1
50 TABLET ORAL 2 TIMES DAILY
Status: DISCONTINUED | OUTPATIENT
Start: 2020-03-22 | End: 2020-03-25 | Stop reason: HOSPADM

## 2020-03-21 RX ORDER — NICOTINE POLACRILEX 4 MG
15 LOZENGE BUCCAL PRN
Status: DISCONTINUED | OUTPATIENT
Start: 2020-03-21 | End: 2020-03-25 | Stop reason: HOSPADM

## 2020-03-21 RX ORDER — SODIUM CHLORIDE 9 MG/ML
INJECTION, SOLUTION INTRAVENOUS CONTINUOUS
Status: DISCONTINUED | OUTPATIENT
Start: 2020-03-22 | End: 2020-03-24

## 2020-03-21 RX ORDER — INSULIN GLARGINE 100 [IU]/ML
20 INJECTION, SOLUTION SUBCUTANEOUS NIGHTLY
Status: DISCONTINUED | OUTPATIENT
Start: 2020-03-22 | End: 2020-03-24

## 2020-03-21 RX ORDER — PANTOPRAZOLE SODIUM 40 MG/1
40 TABLET, DELAYED RELEASE ORAL DAILY
Status: DISCONTINUED | OUTPATIENT
Start: 2020-03-22 | End: 2020-03-25 | Stop reason: HOSPADM

## 2020-03-21 RX ORDER — ACETAMINOPHEN 650 MG/1
650 SUPPOSITORY RECTAL EVERY 6 HOURS PRN
Status: DISCONTINUED | OUTPATIENT
Start: 2020-03-21 | End: 2020-03-25 | Stop reason: HOSPADM

## 2020-03-21 RX ORDER — QUETIAPINE FUMARATE 25 MG/1
50 TABLET, FILM COATED ORAL NIGHTLY
Status: DISCONTINUED | OUTPATIENT
Start: 2020-03-22 | End: 2020-03-25 | Stop reason: HOSPADM

## 2020-03-21 RX ORDER — DEXTROSE MONOHYDRATE 50 MG/ML
100 INJECTION, SOLUTION INTRAVENOUS PRN
Status: DISCONTINUED | OUTPATIENT
Start: 2020-03-21 | End: 2020-03-25 | Stop reason: HOSPADM

## 2020-03-21 RX ORDER — 0.9 % SODIUM CHLORIDE 0.9 %
1000 INTRAVENOUS SOLUTION INTRAVENOUS ONCE
Status: COMPLETED | OUTPATIENT
Start: 2020-03-21 | End: 2020-03-21

## 2020-03-21 RX ORDER — FAMOTIDINE 20 MG/1
20 TABLET, FILM COATED ORAL 2 TIMES DAILY
Status: DISCONTINUED | OUTPATIENT
Start: 2020-03-22 | End: 2020-03-21 | Stop reason: ALTCHOICE

## 2020-03-21 RX ORDER — ASPIRIN 81 MG/1
324 TABLET, CHEWABLE ORAL ONCE
Status: COMPLETED | OUTPATIENT
Start: 2020-03-21 | End: 2020-03-21

## 2020-03-21 RX ORDER — BUPROPION HYDROCHLORIDE 150 MG/1
150 TABLET ORAL DAILY
Status: DISCONTINUED | OUTPATIENT
Start: 2020-03-22 | End: 2020-03-25 | Stop reason: HOSPADM

## 2020-03-21 RX ORDER — ONDANSETRON 2 MG/ML
4 INJECTION INTRAMUSCULAR; INTRAVENOUS EVERY 6 HOURS PRN
Status: DISCONTINUED | OUTPATIENT
Start: 2020-03-21 | End: 2020-03-25 | Stop reason: HOSPADM

## 2020-03-21 RX ORDER — ACETAMINOPHEN 325 MG/1
650 TABLET ORAL EVERY 6 HOURS PRN
Status: DISCONTINUED | OUTPATIENT
Start: 2020-03-21 | End: 2020-03-25 | Stop reason: HOSPADM

## 2020-03-21 RX ORDER — NITROGLYCERIN 0.4 MG/1
0.4 TABLET SUBLINGUAL EVERY 5 MIN PRN
Status: DISCONTINUED | OUTPATIENT
Start: 2020-03-21 | End: 2020-03-25 | Stop reason: HOSPADM

## 2020-03-21 RX ORDER — SODIUM CHLORIDE 0.9 % (FLUSH) 0.9 %
10 SYRINGE (ML) INJECTION PRN
Status: DISCONTINUED | OUTPATIENT
Start: 2020-03-21 | End: 2020-03-25 | Stop reason: HOSPADM

## 2020-03-21 RX ORDER — FENOFIBRATE 160 MG/1
160 TABLET ORAL DAILY
Status: DISCONTINUED | OUTPATIENT
Start: 2020-03-22 | End: 2020-03-25 | Stop reason: HOSPADM

## 2020-03-21 RX ORDER — POTASSIUM CHLORIDE 7.45 MG/ML
10 INJECTION INTRAVENOUS PRN
Status: DISCONTINUED | OUTPATIENT
Start: 2020-03-21 | End: 2020-03-25 | Stop reason: HOSPADM

## 2020-03-21 RX ORDER — NICOTINE 21 MG/24HR
1 PATCH, TRANSDERMAL 24 HOURS TRANSDERMAL DAILY
Status: DISCONTINUED | OUTPATIENT
Start: 2020-03-22 | End: 2020-03-25 | Stop reason: HOSPADM

## 2020-03-21 RX ORDER — MAGNESIUM SULFATE IN WATER 40 MG/ML
2 INJECTION, SOLUTION INTRAVENOUS PRN
Status: DISCONTINUED | OUTPATIENT
Start: 2020-03-21 | End: 2020-03-25 | Stop reason: HOSPADM

## 2020-03-21 RX ORDER — POLYETHYLENE GLYCOL 3350 17 G/17G
17 POWDER, FOR SOLUTION ORAL DAILY PRN
Status: DISCONTINUED | OUTPATIENT
Start: 2020-03-21 | End: 2020-03-25 | Stop reason: HOSPADM

## 2020-03-21 RX ORDER — POTASSIUM CHLORIDE 20 MEQ/1
40 TABLET, EXTENDED RELEASE ORAL PRN
Status: DISCONTINUED | OUTPATIENT
Start: 2020-03-21 | End: 2020-03-25 | Stop reason: HOSPADM

## 2020-03-21 RX ORDER — DEXTROSE MONOHYDRATE 25 G/50ML
12.5 INJECTION, SOLUTION INTRAVENOUS PRN
Status: DISCONTINUED | OUTPATIENT
Start: 2020-03-21 | End: 2020-03-25 | Stop reason: HOSPADM

## 2020-03-21 RX ORDER — ASPIRIN 81 MG/1
81 TABLET, CHEWABLE ORAL DAILY
Status: DISCONTINUED | OUTPATIENT
Start: 2020-03-22 | End: 2020-03-25 | Stop reason: HOSPADM

## 2020-03-21 RX ORDER — SODIUM CHLORIDE 0.9 % (FLUSH) 0.9 %
10 SYRINGE (ML) INJECTION EVERY 12 HOURS SCHEDULED
Status: DISCONTINUED | OUTPATIENT
Start: 2020-03-22 | End: 2020-03-25 | Stop reason: HOSPADM

## 2020-03-21 RX ADMIN — SODIUM CHLORIDE 1000 ML: 9 INJECTION, SOLUTION INTRAVENOUS at 22:14

## 2020-03-21 RX ADMIN — ASPIRIN 81 MG 324 MG: 81 TABLET ORAL at 22:18

## 2020-03-21 RX ADMIN — SODIUM CHLORIDE 1000 ML: 9 INJECTION, SOLUTION INTRAVENOUS at 22:18

## 2020-03-21 RX ADMIN — INSULIN HUMAN 10 UNITS: 100 INJECTION, SOLUTION PARENTERAL at 22:15

## 2020-03-21 ASSESSMENT — PAIN DESCRIPTION - LOCATION: LOCATION: CHEST;SHOULDER

## 2020-03-21 ASSESSMENT — PAIN DESCRIPTION - DESCRIPTORS: DESCRIPTORS: ACHING;CONSTANT

## 2020-03-21 ASSESSMENT — PAIN DESCRIPTION - ORIENTATION: ORIENTATION: LEFT

## 2020-03-22 LAB
A/G RATIO: 1 (ref 1.1–2.2)
ALBUMIN SERPL-MCNC: 2.4 G/DL (ref 3.4–5)
ALBUMIN SERPL-MCNC: 3.6 G/DL (ref 3.4–5)
ALP BLD-CCNC: 143 U/L (ref 40–129)
ALP BLD-CCNC: 229 U/L (ref 40–129)
ALT SERPL-CCNC: 24 U/L (ref 10–40)
ALT SERPL-CCNC: 40 U/L (ref 10–40)
ANION GAP SERPL CALCULATED.3IONS-SCNC: 10 MMOL/L (ref 3–16)
AST SERPL-CCNC: 17 U/L (ref 15–37)
AST SERPL-CCNC: 30 U/L (ref 15–37)
BILIRUB SERPL-MCNC: 0.3 MG/DL (ref 0–1)
BILIRUB SERPL-MCNC: <0.2 MG/DL (ref 0–1)
BILIRUBIN DIRECT: <0.2 MG/DL (ref 0–0.3)
BILIRUBIN, INDIRECT: ABNORMAL MG/DL (ref 0–1)
BUN BLDV-MCNC: 18 MG/DL (ref 7–20)
CALCIUM SERPL-MCNC: 7.5 MG/DL (ref 8.3–10.6)
CHLORIDE BLD-SCNC: 102 MMOL/L (ref 99–110)
CHOLESTEROL, TOTAL: 133 MG/DL (ref 0–199)
CO2: 20 MMOL/L (ref 21–32)
CREAT SERPL-MCNC: 1.1 MG/DL (ref 0.9–1.3)
EKG ATRIAL RATE: 72 BPM
EKG ATRIAL RATE: 73 BPM
EKG DIAGNOSIS: NORMAL
EKG DIAGNOSIS: NORMAL
EKG P AXIS: 71 DEGREES
EKG P AXIS: 80 DEGREES
EKG P-R INTERVAL: 132 MS
EKG P-R INTERVAL: 132 MS
EKG Q-T INTERVAL: 382 MS
EKG Q-T INTERVAL: 426 MS
EKG QRS DURATION: 86 MS
EKG QRS DURATION: 88 MS
EKG QTC CALCULATION (BAZETT): 420 MS
EKG QTC CALCULATION (BAZETT): 466 MS
EKG R AXIS: 87 DEGREES
EKG R AXIS: 89 DEGREES
EKG T AXIS: 73 DEGREES
EKG T AXIS: 77 DEGREES
EKG VENTRICULAR RATE: 72 BPM
EKG VENTRICULAR RATE: 73 BPM
ESTIMATED AVERAGE GLUCOSE: 444.1 MG/DL
GFR AFRICAN AMERICAN: >60
GFR NON-AFRICAN AMERICAN: >60
GLOBULIN: 2.4 G/DL
GLUCOSE BLD-MCNC: 108 MG/DL (ref 70–99)
GLUCOSE BLD-MCNC: 189 MG/DL (ref 70–99)
GLUCOSE BLD-MCNC: 244 MG/DL (ref 70–99)
GLUCOSE BLD-MCNC: 283 MG/DL (ref 70–99)
GLUCOSE BLD-MCNC: 290 MG/DL (ref 70–99)
GLUCOSE BLD-MCNC: 370 MG/DL (ref 70–99)
HBA1C MFR BLD: 17.1 %
HCT VFR BLD CALC: 36.5 % (ref 40.5–52.5)
HDLC SERPL-MCNC: 31 MG/DL (ref 40–60)
HEMOGLOBIN: 12.2 G/DL (ref 13.5–17.5)
LDL CHOLESTEROL CALCULATED: 70 MG/DL
MCH RBC QN AUTO: 27.6 PG (ref 26–34)
MCHC RBC AUTO-ENTMCNC: 33.6 G/DL (ref 31–36)
MCV RBC AUTO: 82.4 FL (ref 80–100)
PDW BLD-RTO: 13.7 % (ref 12.4–15.4)
PERFORMED ON: ABNORMAL
PLATELET # BLD: 261 K/UL (ref 135–450)
PMV BLD AUTO: 8.8 FL (ref 5–10.5)
POTASSIUM REFLEX MAGNESIUM: 3.6 MMOL/L (ref 3.5–5.1)
RBC # BLD: 4.43 M/UL (ref 4.2–5.9)
SODIUM BLD-SCNC: 132 MMOL/L (ref 136–145)
TOTAL PROTEIN: 4.8 G/DL (ref 6.4–8.2)
TOTAL PROTEIN: 7.2 G/DL (ref 6.4–8.2)
TRIGL SERPL-MCNC: 158 MG/DL (ref 0–150)
TROPONIN: <0.01 NG/ML
TROPONIN: <0.01 NG/ML
VLDLC SERPL CALC-MCNC: 32 MG/DL
WBC # BLD: 8 K/UL (ref 4–11)

## 2020-03-22 PROCEDURE — 6360000002 HC RX W HCPCS: Performed by: INTERNAL MEDICINE

## 2020-03-22 PROCEDURE — 2060000000 HC ICU INTERMEDIATE R&B

## 2020-03-22 PROCEDURE — 96361 HYDRATE IV INFUSION ADD-ON: CPT

## 2020-03-22 PROCEDURE — G0378 HOSPITAL OBSERVATION PER HR: HCPCS

## 2020-03-22 PROCEDURE — 93010 ELECTROCARDIOGRAM REPORT: CPT | Performed by: INTERNAL MEDICINE

## 2020-03-22 PROCEDURE — 36415 COLL VENOUS BLD VENIPUNCTURE: CPT

## 2020-03-22 PROCEDURE — 93005 ELECTROCARDIOGRAM TRACING: CPT | Performed by: INTERNAL MEDICINE

## 2020-03-22 PROCEDURE — 6370000000 HC RX 637 (ALT 250 FOR IP): Performed by: INTERNAL MEDICINE

## 2020-03-22 PROCEDURE — 80053 COMPREHEN METABOLIC PANEL: CPT

## 2020-03-22 PROCEDURE — 84484 ASSAY OF TROPONIN QUANT: CPT

## 2020-03-22 PROCEDURE — 80061 LIPID PANEL: CPT

## 2020-03-22 PROCEDURE — 99244 OFF/OP CNSLTJ NEW/EST MOD 40: CPT | Performed by: INTERNAL MEDICINE

## 2020-03-22 PROCEDURE — 85027 COMPLETE CBC AUTOMATED: CPT

## 2020-03-22 PROCEDURE — 2580000003 HC RX 258: Performed by: INTERNAL MEDICINE

## 2020-03-22 PROCEDURE — 96372 THER/PROPH/DIAG INJ SC/IM: CPT

## 2020-03-22 RX ADMIN — PANTOPRAZOLE SODIUM 40 MG: 40 TABLET, DELAYED RELEASE ORAL at 05:36

## 2020-03-22 RX ADMIN — INSULIN LISPRO 4 UNITS: 100 INJECTION, SOLUTION INTRAVENOUS; SUBCUTANEOUS at 21:18

## 2020-03-22 RX ADMIN — BUPROPION HYDROCHLORIDE 150 MG: 150 TABLET, FILM COATED, EXTENDED RELEASE ORAL at 09:41

## 2020-03-22 RX ADMIN — SODIUM CHLORIDE: 9 INJECTION, SOLUTION INTRAVENOUS at 00:05

## 2020-03-22 RX ADMIN — ENOXAPARIN SODIUM 40 MG: 40 INJECTION SUBCUTANEOUS at 09:41

## 2020-03-22 RX ADMIN — LOSARTAN POTASSIUM 50 MG: 25 TABLET, FILM COATED ORAL at 09:41

## 2020-03-22 RX ADMIN — INSULIN LISPRO 6 UNITS: 100 INJECTION, SOLUTION INTRAVENOUS; SUBCUTANEOUS at 05:37

## 2020-03-22 RX ADMIN — QUETIAPINE FUMARATE 50 MG: 25 TABLET ORAL at 21:17

## 2020-03-22 RX ADMIN — INSULIN GLARGINE 20 UNITS: 100 INJECTION, SOLUTION SUBCUTANEOUS at 21:18

## 2020-03-22 RX ADMIN — QUETIAPINE FUMARATE 50 MG: 25 TABLET ORAL at 00:05

## 2020-03-22 RX ADMIN — LOSARTAN POTASSIUM 50 MG: 25 TABLET, FILM COATED ORAL at 00:05

## 2020-03-22 RX ADMIN — SODIUM CHLORIDE: 9 INJECTION, SOLUTION INTRAVENOUS at 17:41

## 2020-03-22 RX ADMIN — INSULIN GLARGINE 20 UNITS: 100 INJECTION, SOLUTION SUBCUTANEOUS at 00:07

## 2020-03-22 RX ADMIN — ASPIRIN 81 MG 81 MG: 81 TABLET ORAL at 09:41

## 2020-03-22 RX ADMIN — ATORVASTATIN CALCIUM 40 MG: 40 TABLET, FILM COATED ORAL at 21:17

## 2020-03-22 RX ADMIN — SODIUM CHLORIDE: 9 INJECTION, SOLUTION INTRAVENOUS at 05:37

## 2020-03-22 RX ADMIN — FENOFIBRATE 160 MG: 160 TABLET ORAL at 09:41

## 2020-03-22 RX ADMIN — INSULIN LISPRO 8 UNITS: 100 INJECTION, SOLUTION INTRAVENOUS; SUBCUTANEOUS at 00:06

## 2020-03-22 RX ADMIN — ATORVASTATIN CALCIUM 40 MG: 40 TABLET, FILM COATED ORAL at 00:05

## 2020-03-22 RX ADMIN — INSULIN LISPRO 10 UNITS: 100 INJECTION, SOLUTION INTRAVENOUS; SUBCUTANEOUS at 17:40

## 2020-03-22 RX ADMIN — LOSARTAN POTASSIUM 50 MG: 25 TABLET, FILM COATED ORAL at 21:17

## 2020-03-22 ASSESSMENT — PAIN SCALES - GENERAL: PAINLEVEL_OUTOF10: 0

## 2020-03-22 NOTE — ED PROVIDER NOTES
[address] ED  EMERGENCY DEPARTMENT ENCOUNTER      Pt Name: Marlena Ortiz  MRN: [id number]  [place] 1970  Date of evaluation: 3/21/2020  Provider: Sherrill Newsome DO    CHIEF COMPLAINT       Chief Complaint   Patient presents with    Chest Pain     c/o chest pain x 3-4 days. states bilateral hands and feet are numb. hx neuropathy, dm. States has not been on insulin since july         HISTORY OF PRESENT ILLNESS   (Location/Symptom, Timing/Onset, Context/Setting, Quality, Duration, Modifying Factors, Severity)  Note limiting factors. Marlena Ortiz is a 54 y.o. male with a history of bipolar 1 disorder, chronic kidney disease, diabetes supposed to be on insulin hyperlipidemia, polysubstance abuse who presents to the emergency department with complaint of chest pain, bilateral hand and feet numbness, elevated blood sugar not on insulin since July. Patient reports for the past 4 days he has been having intermittent substernal chest pain. Reports is pressurized and radiating down his arms. Not associated with exertion. Does have some shortness of breath with it. States he has a history of neuropathy and he is been having pains and needles feeling in his hands and feet. Denies any unilateral weakness or deficits. Also states that he has no primary doctor's has been out of his insulin since July. He does have a history of  Hyperlipidemia and diabetes. Denies recent travel or history of blood clots. Nursing Notes were reviewed.       PAST MEDICAL HISTORY     Past Medical History:   Diagnosis Date    Back pain     Chronic    Bipolar 1 disorder ([address] 2/13    Chest pain     Secondary to GERD    Chronic kidney disease     CKD (chronic kidney disease)     DDD (degenerative disc disease), lumbar 2013    + MRI - no narcotics from this office    Depression, endogenous ([address]     suicide [facility] 10/11, [facility]; [facility]    Diabetes mellitus ([address] 1998    GERD (gastroesophageal reflux disease)     Hepatitis C 11/29/2017    + RNA by pcr    Hepatitis C antibody positive in blood 11/27/2017    AB +    Hyperlipidemia     LDL 80    Hypertension     Hypotestosteronism 1/12    Treatment not effective per Dr Marie Him Marijuana use     [facility]     Opiate abuse, continuous ([facility]     Proteinuria     Biopsy proven diabetic nephropathy    Vitamin D deficiency 6/25/2013         SURGICAL HISTORY       Past Surgical History:   Procedure Laterality Date    APPENDECTOMY  5    BACK SURGERY  4/25/2013    Hemilaminectomy L5S1 right/Disectomy of Herniated L5S1 right    DENTAL SURGERY  2001    oral surgey    KIDNEY BIOPSY      KNEE ARTHROSCOPY      cyst removal    OTHER SURGICAL HISTORY Left 11/28/2017    left arm Irrigation and drainage    VASECTOMY           CURRENT MEDICATIONS       Previous Medications    ACETAMINOPHEN 325 MG CAPS    Take 650 mg by mouth every 6 hours as needed (pain)    ALCOHOL SWABS PADS    1 each by Does not apply route 4 times daily    ASPIRIN 81 MG TABLET    Take 81 mg by mouth daily.     BLOOD GLUCOSE TEST STRIPS (FREESTYLE LITE) STRIP    Patient test three times daily due to fluctuating blood sugars  Diagnosis = 250.00 NIDDM    BUPROPION (WELLBUTRIN XL) 150 MG EXTENDED RELEASE TABLET    Take 1 tablet by mouth daily    FENOFIBRATE (TRICOR) 145 MG TABLET    Take 145 mg by mouth daily    FENOFIBRATE 160 MG TABLET    Take 1 tablet by mouth daily    FREESTYLE LANCETS MISC    1 each by Does not apply route 4 times daily    GLUCOSE MONITORING KIT (FREESTYLE) MONITORING KIT    1 kit by Does not apply route 4 times daily    HUMALOG 100 UNIT/ML INJECTION VIAL    INJECT 0-16 UNITS 3 TIMES A DAY BEFORE MEALS    INSULIN GLARGINE (BASAGLAR KWIKPEN) 100 UNIT/ML INJECTION PEN    Inject 20 Units into the skin nightly    INSULIN LISPRO (HUMALOG) 100 UNIT/ML INJECTION VIAL    Inject 0-6 Units into the skin nightly    INSULIN PEN NEEDLE 29G X 12.7MM MISC    1 each by Does signs or Co-signs this chart in the absence of a cardiologist.      RADIOLOGY:   Interpretation per the Radiologist below, if available at the time of this note:    XR CHEST PORTABLE   Final Result   No acute process.                LABS:  Labs Reviewed   CBC WITH AUTO DIFFERENTIAL - Abnormal; Notable for the following components:       Result Value    Neutrophils Absolute 7.8 (*)     All other components within normal limits    Narrative:     Performed at:  [facility],  [email or web address]   Phone [phone]   BASIC METABOLIC PANEL W/ REFLEX TO MG FOR LOW K - Abnormal; Notable for the following components:    Sodium 123 (*)     Potassium reflex Magnesium 5.2 (*)     Chloride 84 (*)     Glucose 877 (*)     CREATININE 1.4 (*)     GFR Non- 54 (*)     All other components within normal limits    Narrative:     Torres CHEEK tel. [phone],  Chemistry results called to and read back by Marleni Mace, 03/21/2020  22:06, by Summer Burkett  Performed at:  [facility],  [email or web address]   Phone [phone]   BRAIN NATRIURETIC PEPTIDE - Abnormal; Notable for the following components:    Pro- (*)     All other components within normal limits    Narrative:     Performed at:  [facility],  [email or web address]   Phone [phone]   BLOOD GAS, VENOUS - Abnormal; Notable for the following components:    pH, Shivam 7.327 (*)     Carboxyhemoglobin 6.2 (*)     All other components within normal limits    Narrative:     Performed at:  [facility]) - [facility],  [employer], [employer]   Phone [phone]   POCT GLUCOSE - Abnormal; Notable for the following components:    POC Glucose >600 (*)     All other components within normal limits    Narrative:     Performed at:  [facility]  [address],  [place], [facility]   Phone [phone]   TROPONIN    Narrative:     Performed at:  [facility]) - [facility]  [address]ina Salazar,  [place], [facility]   Phone [phone]   URINE RT REFLEX TO CULTURE   URINE DRUG SCREEN   CK   POCT GLUCOSE       All other labs were within normal range or not returned as of this dictation. EMERGENCY DEPARTMENT COURSE and DIFFERENTIAL DIAGNOSIS/MDM:   Vitals:    Vitals:    03/21/20 2111   BP: (!) 173/75   Pulse: 91   Resp: 18   Temp: 97.7 °F (36.5 °C)   TempSrc: Oral   SpO2: 100%   Weight: 117 lb (53.1 kg)   Height: 5' 10\" (1.778 m)         MDM  37-year-old male presents the emergency department with complaint of chest pain and elevated blood sugar. Vital signs stable. Physical exam as above. Work-up shows a CBC without leukocytosis. BMP shows a blood sugar of 877. Sodium 123 but corrected is around 142. Potassium slightly elevated at 5.2 but patient receiving IV fluids and 10 units of insulin. Bicarb is normal and anion gap is only 14. Creatinine bumped at 1.4. Troponin negative. pH shows a venous blood gas of 7.32. Chest x-ray clear. Urine drug screen and CK in process.   With patient's elevated blood sugar, no follow-up with primary care as well as cardiac risk factors believe he should be evaluated in the hospital.  Will discuss with the hospitalist.    Ayleen James PE    PE risk, Wells score:  0clinically suspected DVT - 3 points  0alternative diagnosis is less likely than PE - 3.0 points  0tachycardia - 1.5 points  0immobilization/surgery in previous four weeks - 1.5 points  0history of DVT or PE - 1.5 points  0hemoptysis - 1.0 points  0malignancy (treatment for within 6 months, palliative) - 1.0 points  Interpretation Johnathan Vicente et al. 2007 Radiology 242:15-21)  Score >6.0 - High (probability 59% based on pooled data)  Score 2.0 to 6.0 - Moderate (probability 29% based on pooled data)  Score <2.0 - Low (probability 15% No

## 2020-03-22 NOTE — PROGRESS NOTES
Patient is able to demonstrated the ability to move from a reclining position to an upright position within the recliner.  Devon Harrison

## 2020-03-22 NOTE — PROGRESS NOTES
FSBS 189. Pt. Refusing humalog at this time. States that when he gets below 150 he start to feel bad.  Tri Echavarria RN

## 2020-03-22 NOTE — PROGRESS NOTES
Pt up to unit, oriented to room and call light system. Pt A/O X4. Admission assessment complete. Pt states last use of IV meth was 4 days ago. Pt is homeless, has been on the streets for about 4 days. Pt has not taken any home medications in about 6 months. Denies being suicidal but has felt like that in the past. And states he has just given up. Pt states he has no family support. Update sent to MD. Solano Bodily lunch given to pt. No needs expressed at this time. Eugene light in reach.  Will monitor Mariza Norman

## 2020-03-22 NOTE — CONSULTS
[address] [facility]  [phone]        Reason for Consultation/Chief Complaint: \"I have been having chest pain . \" Consulted for chest pain    History of Present Illness:  Faye Carreon is a 54 y.o. patient who presented to [facility] 3/21/20 with c/o CP. No prior cardiac history and no routine medical care. No medications for months. He has PMH of polysubstance abuse/homelessness, hepatitis C, HLD, HTN, DM, tobacco abuse, and bipolar disorder. Presented with c/o CP on left side chest described as sharp with no radiation and associated with dizziness. Pain was constant for few days but feels better now. Nothing made better or worse and he has never had this pain before. He uses \"ice\" meth and reported to IM doc last use 4 days ago. Admit EKG showed NSR; cannot r/o prior anterior infarct. Second EKG shows NSR; artifact; nonspecific ST change (prior anterior infarct pattern no longer present). Note ZIM=094; Scott negative x 3; note CXR negative; K+ 3.6; BUN/Cr=18/1.1. Patient with no complaints of SOB, palpitations,  edema, or orthopnea/PND. I have been asked to provide consultation regarding further management and testing. Past Medical History:   has a past medical history of Anxiety, Back pain, Bipolar 1 disorder ([address] Chest pain, Chronic kidney disease, CKD (chronic kidney disease), DDD (degenerative disc disease), lumbar, Depression, endogenous ([address] Diabetes mellitus ([address] GERD (gastroesophageal reflux disease), Hepatitis C, Hepatitis C antibody positive in blood, Hyperlipidemia, Hypertension, Hypotestosteronism, Marijuana use, Narcotic abuse ([address] Opiate abuse, continuous ([address] Proteinuria, PTSD (post-traumatic stress disorder), and Vitamin D deficiency. Surgical History:   has a past surgical history that includes Appendectomy (1985); Knee arthroscopy; Vasectomy; Dental surgery (2001); back surgery (4/25/2013);  Kidney biopsy; other surgical history (Left, 11/28/2017); eye surgery; Soft, non-tender, bowel sounds active all four quadrants,  no masses, no organomegaly           Extremities: Extremities normal, atraumatic, no cyanosis or edema   Pulses: 2+ and symmetric   Skin: Skin color, texture, turgor normal, no rashes or lesions   Pysch: Normal mood and affect   Neurologic: Normal gross motor and sensory exam.         Labs  CBC:   Lab Results   Component Value Date    WBC 8.0 03/22/2020    RBC 4.43 03/22/2020    HGB 12.2 03/22/2020    HCT 36.5 03/22/2020    MCV 82.4 03/22/2020    RDW 13.7 03/22/2020     03/22/2020     CMP:    Lab Results   Component Value Date     03/22/2020    K 3.6 03/22/2020     03/22/2020    CO2 20 03/22/2020    BUN 18 03/22/2020    CREATININE 1.1 03/22/2020    GFRAA >60 03/22/2020    GFRAA >60 06/06/2013    AGRATIO 1.0 03/22/2020    LABGLOM >60 03/22/2020    GLUCOSE 290 03/22/2020    PROT 4.8 03/22/2020    PROT 7.0 11/04/2011    CALCIUM 7.5 03/22/2020    BILITOT <0.2 03/22/2020    BILITOT 0.5 04/02/2012    ALKPHOS 143 03/22/2020    AST 17 03/22/2020    ALT 24 03/22/2020     PT/INR:  No results found for: Mango-Mate  Lab Results   Component Value Date    CKTOTAL 246 03/21/2020    TROPONINI <0.01 03/22/2020       EKG:  I have reviewed EKG with the following interpretation:  Impression:  See HPI    Assessment:  Hortensia Grove is a 54 y.o. patient who presented to [facility] 3/21/20 with c/o CP. No prior cardiac history and no routine medical care. No medications for months. He has PMH of polysubstance abuse/homelessness, hepatitis C, HLD, HTN, DM, and bipolar disorder. Presented with c/o CP on left side chest described as sharp with no radiation and associated with dizziness. Pain was constant for few days but feels better now. Nothing made better or worse and he has never had this pain before. He uses \"ice\" meth and reported to IM doc last use 4 days ago. Admit EKG showed NSR; cannot r/o prior anterior infarct.  Second EKG shows NSR; artifact; nonspecific ST change (prior anterior infarct pattern no longer present). Note RSH=939; Scott negative x 3; note CXR negative; K+ 3.6; BUN/Cr=18/1.1. Diagnosis of unspecified chest pain in middle-aged male with CAD risk factors including age/gender, tobacco abuse, DM, HTN, and HLD. No medical care or medications for months. Ruled out for MI and EKG with nonspecific ST change without obvious ischemia. Recs:  1. Given CAD risk factors and no medical care or medications I believe he merits non-invasive cardiac evaluation for new chest pain. 2. I agree with GXT nuclear stress test to assess myocardial perfusion and LV function. 3. I agree with ECHO to evaluate LV function and size, wall motion, and valves for any structural abnormalities. 4. Continue losartan 50mg BID, baby aspirin qd, lipitor 40mg qd, tricor 160mg qd.  5. Needs SW consult    Patient Active Problem List   Diagnosis    DM (diabetes mellitus) ([address]    GERD (gastroesophageal reflux disease)    Hyperlipidemia with target LDL less than 100    Smoker    Hypotestosteronism    Bipolar 1 disorder (HCC)    Albuminuria    Microalbuminuria    Vitamin D deficiency    Nephrotic syndrome    HTN (hypertension)    Acute renal failure superimposed on stage 3 chronic kidney disease (HCC)    Left leg pain    Bandemia    Myositis of left thigh    Severe sepsis (HCC)    Abscess of left forearm    IVDA (intravenous drug abuse) complicating pregnancy ([address]    TI (acute kidney injury) ([address]    Hyperglycemia    Sepsis ([address]    Poorly controlled diabetes mellitus ([address]    Severe episode of recurrent major depressive disorder, without psychotic features ([address]    Suicide risk    Chest pain       Thank you for allowing to us to participate in the cole or Reva Shea. Further evaluation will be based upon the patient's clinical course and testing results.

## 2020-03-22 NOTE — FLOWSHEET NOTE
03/22/20 1445   Encounter Summary   Services provided to: Patient   Referral/Consult From: Nurse;Patient   Support System Unknown  (Homeless)   Continue Visiting Yes  (3/22 Initial visit/prayer. Gave ADs. Will call.)   Complexity of Encounter Moderate   Length of Encounter 15 minutes   Advance Care Planning Yes   Spiritual/Catholic   Type Spiritual support   Assessment Calm; Approachable;Doubtful  (Homeless & Lethargic)   Intervention Active listening;Explored feelings, thoughts, concerns;Nurtured hope;Prayer;Discussed illness/injury and it's impact   Outcome Comfort;Expressed gratitude;Engaged in conversation;Expressed feelings/needs/concerns;Encouraged;Receptive   Advance Directives (For Healthcare)   Healthcare Directive No, patient does not have an advance directive for healthcare treatment   Advance Directives Documents given;Pt. not interested at this time  (Pt will call  when ready to discuss ADs)

## 2020-03-22 NOTE — PROGRESS NOTES
III-XII intact, moves all 4 spontaneously, sensory grossly intact except for bilat hands and feet which have decreased sensation. SKIN:       No rash or lesions on visible skin.       Labs:   Recent Labs     03/21/20 2122 03/22/20  0452   WBC 10.5 8.0   HGB 15.2 12.2*   HCT 47.7 36.5*    261     Recent Labs     03/21/20 2122 03/22/20  0452   * 132*   K 5.2* 3.6   CL 84* 102   CO2 25 20*   BUN 17 18   CREATININE 1.4* 1.1   CALCIUM 8.3 7.5*     Recent Labs     03/21/20 2122 03/22/20  0452   AST 30 17   ALT 40 24   BILIDIR <0.2  --    BILITOT 0.3 <0.2   ALKPHOS 229* 143*     No results for input(s): INR in the last 72 hours. Recent Labs     03/21/20 2122 03/21/20  2358 03/22/20  0231   CKTOTAL 246  --   --    TROPONINI <0.01 <0.01 <0.01       Urinalysis:      Lab Results   Component Value Date    NITRU Negative 03/21/2020    WBCUA 0-2 03/21/2020    BACTERIA 1+ 06/19/2018    RBCUA 3-4 03/21/2020    BLOODU Negative 03/21/2020    SPECGRAV <=1.005 03/21/2020    GLUCOSEU >=1000 03/21/2020    GLUCOSEU >=1000 10/10/2011       Radiology:  XR CHEST PORTABLE   Final Result   No acute process. NM Cardiac Stress Test Nuclear Imaging    (Results Pending)           Assessment/Plan:    Active Hospital Problems    Diagnosis    Chest pain [R07.9]       Chest pain  pain free now  Serial trop (-)x3  ACS unlikely. Pending stress testing. IDDM non compliant with med regimen. w/ marked hyperglycemia,  not taken insulin since July, no PCP  initial . Not in DKA. BG much better on present regimen. HTN uncontrolled, 173/75 at arrival in ED. Resume losartan        CKD, Cr 1.4, likely stable, limited # but similar previous recent values. Repeat in am.  IVF. Active Hx of polysubstance abuse. Tox negative, but admits to \"ICE\" use (most potent form of methamphetamine). Meth only shows on urine tox for around 72 hours. Last use 4 days ago.     Tox has been positive in past for

## 2020-03-22 NOTE — PROGRESS NOTES
Pt. Resting in bed. Pt. Assessment completed- see flow sheet. No signs of distress noted. Pt. denies further needs at this time. Will continue to monitor. Call light is within reach.   Dolly Camacho RN

## 2020-03-22 NOTE — H&P
Hospital Medicine History & Physical      PCP: No primary care provider on file. Date of Service: Pt seen/examined on 3/21/20 and admitted on 3/21/20 to Inpatient    Chief Complaint   Patient presents with    Chest Pain     c/o chest pain x 3-4 days. states bilateral hands and feet are numb. hx neuropathy, dm. States has not been on insulin since july       History Of Present Illness: The patient is a 54 y.o. male with PMH below, presents with chest pain, meth abuse, hyperglycemia, bilat hand and foot numbness, homelessness. Pt reports substernal CP that radiates down into both hands and feet but describes hand and feet as pins and needles. CP is described s pressure. Comes and goes. None currently. Pt is supposed to be on insulin but has not had any since July. He reports his BGT's have been \"high. \". He is also homeless and is using \"ice\" (meth). His last use was 4 days ago which was when his CP started.       Past Medical History:        Diagnosis Date    Back pain     Chronic    Bipolar 1 disorder ([address] 2/13    Chest pain     Secondary to GERD    Chronic kidney disease     CKD (chronic kidney disease)     DDD (degenerative disc disease), lumbar 2013    + MRI - no narcotics from this office    Depression, endogenous ([address]     suicide attempt 10/11, [facility]; [facility]    Diabetes mellitus ([address] 1998    GERD (gastroesophageal reflux disease)     Hepatitis C 11/29/2017    + RNA by pcr    Hepatitis C antibody positive in blood 11/27/2017    AB +    Hyperlipidemia     LDL 80    Hypertension     Hypotestosteronism 1/12    Treatment not effective per Dr Monse Lopez Marijuana use     Narcotic abuse ([address]     Opiate abuse, continuous ([address]     Proteinuria     Biopsy proven diabetic nephropathy    Vitamin D deficiency 6/25/2013       Past Surgical History:        Procedure Laterality Date    APPENDECTOMY  1985    BACK SURGERY  4/25/2013    Hemilaminectomy L5S1 right/Disectomy of bilat hands and feet which have decreased sensation. SKIN: No rash or lesions on visible skin. Chart review shows recent radiographs:  Xr Chest Portable    Result Date: 3/21/2020  EXAMINATION: ONE XRAY VIEW OF THE CHEST 3/21/2020 9:26 pm COMPARISON: 10/25/2016 HISTORY: ORDERING SYSTEM PROVIDED HISTORY: cp TECHNOLOGIST PROVIDED HISTORY: Reason for exam:->cp Reason for Exam: chest pain Acuity: Acute Type of Exam: Ongoing Additional signs and symptoms: pt c/o chest pain for 4 days FINDINGS: The lungs are without acute focal process. There is no effusion or pneumothorax. The cardiomediastinal silhouette is without acute process. The osseous structures are without acute process. No acute process. EKG 12 Lead [[id number]]    Collected: 03/21/20 2114    Updated: 03/21/20 2121     Ventricular Rate 73 BPM    Atrial Rate 73 BPM    P-R Interval 132 ms    QRS Duration 88 ms    Q-T Interval 382 ms    QTc Calculation (Bazett) 420 ms    P Axis 71 degrees    R Axis 87 degrees    T Axis 73 degrees    Diagnosis Normal sinus rhythmCannot rule out Anterior infarct , age undeterminedAbnormal ECGWhen compared with ECG of 14-NOV-2019 10:59,No significant change was found         CBC:  Recent Labs     03/21/20 2122   WBC 10.5   HGB 15.2   HCT 47.7           RENAL  Recent Labs     03/21/20 2122   *   K 5.2*   CL 84*   CO2 25   BUN 17   CREATININE 1.4*   GLUCOSE 877*       Hemoglobin a1c:  Lab Results   Component Value Date    LABA1C 13.6 07/30/2018    LABA1C 15.3 11/27/2017    LABA1C 11.2 10/25/2016       LFT'S:  Added on.     CARDIAC ENZYMES:   Recent Labs     03/21/20 2122   TROPONINI <0.01     Lab Results   Component Value Date    PROBNP 248 (H) 03/21/2020    PROBNP 374 (H) 05/05/2014     U/A:  Recent Labs     03/21/20 2220   LEUKOCYTESUR Negative   COLORU Yellow   CLARITYU Clear   SPECGRAV <=1.005   BLOODU Negative   GLUCOSEU >=1000*     VBG:  Recent Labs     03/21/20 2122   PHVEN 7.327*   [id number] 47.3

## 2020-03-22 NOTE — PROGRESS NOTES
Pt in bed, eyes closed. No distress noted. Call light in reach. Will continue to monitor.   Moses Adan

## 2020-03-23 ENCOUNTER — APPOINTMENT (OUTPATIENT)
Dept: NUCLEAR MEDICINE | Age: 50
End: 2020-03-23
Payer: MEDICAID

## 2020-03-23 PROBLEM — L02.414 ABSCESS OF LEFT FOREARM: Status: RESOLVED | Noted: 2017-11-27 | Resolved: 2020-03-23

## 2020-03-23 PROBLEM — N17.9 AKI (ACUTE KIDNEY INJURY) (HCC): Status: RESOLVED | Noted: 2017-11-27 | Resolved: 2020-03-23

## 2020-03-23 LAB
GLUCOSE BLD-MCNC: 132 MG/DL (ref 70–99)
GLUCOSE BLD-MCNC: 135 MG/DL (ref 70–99)
GLUCOSE BLD-MCNC: 239 MG/DL (ref 70–99)
GLUCOSE BLD-MCNC: 279 MG/DL (ref 70–99)
GLUCOSE BLD-MCNC: 350 MG/DL (ref 70–99)
GLUCOSE BLD-MCNC: 386 MG/DL (ref 70–99)
LV EF: 58 %
LV EF: 75 %
LVEF MODALITY: NORMAL
LVEF MODALITY: NORMAL
PERFORMED ON: ABNORMAL

## 2020-03-23 PROCEDURE — A9502 TC99M TETROFOSMIN: HCPCS | Performed by: INTERNAL MEDICINE

## 2020-03-23 PROCEDURE — 99224 PR SBSQ OBSERVATION CARE/DAY 15 MINUTES: CPT | Performed by: INTERNAL MEDICINE

## 2020-03-23 PROCEDURE — 6370000000 HC RX 637 (ALT 250 FOR IP): Performed by: INTERNAL MEDICINE

## 2020-03-23 PROCEDURE — 93017 CV STRESS TEST TRACING ONLY: CPT

## 2020-03-23 PROCEDURE — 3430000000 HC RX DIAGNOSTIC RADIOPHARMACEUTICAL: Performed by: INTERNAL MEDICINE

## 2020-03-23 PROCEDURE — 6360000002 HC RX W HCPCS: Performed by: INTERNAL MEDICINE

## 2020-03-23 PROCEDURE — 93306 TTE W/DOPPLER COMPLETE: CPT

## 2020-03-23 PROCEDURE — 96361 HYDRATE IV INFUSION ADD-ON: CPT

## 2020-03-23 PROCEDURE — 99214 OFFICE O/P EST MOD 30 MIN: CPT | Performed by: NURSE PRACTITIONER

## 2020-03-23 PROCEDURE — G0378 HOSPITAL OBSERVATION PER HR: HCPCS

## 2020-03-23 PROCEDURE — 78452 HT MUSCLE IMAGE SPECT MULT: CPT

## 2020-03-23 PROCEDURE — 2580000003 HC RX 258: Performed by: INTERNAL MEDICINE

## 2020-03-23 RX ADMIN — QUETIAPINE FUMARATE 50 MG: 25 TABLET ORAL at 21:34

## 2020-03-23 RX ADMIN — TETROFOSMIN 11 MILLICURIE: 1.38 INJECTION, POWDER, LYOPHILIZED, FOR SOLUTION INTRAVENOUS at 07:58

## 2020-03-23 RX ADMIN — FENOFIBRATE 160 MG: 160 TABLET ORAL at 13:39

## 2020-03-23 RX ADMIN — Medication 10 ML: at 13:40

## 2020-03-23 RX ADMIN — INSULIN GLARGINE 20 UNITS: 100 INJECTION, SOLUTION SUBCUTANEOUS at 21:35

## 2020-03-23 RX ADMIN — LOSARTAN POTASSIUM 50 MG: 25 TABLET, FILM COATED ORAL at 13:39

## 2020-03-23 RX ADMIN — LOSARTAN POTASSIUM 50 MG: 25 TABLET, FILM COATED ORAL at 21:33

## 2020-03-23 RX ADMIN — BUPROPION HYDROCHLORIDE 150 MG: 150 TABLET, FILM COATED, EXTENDED RELEASE ORAL at 13:40

## 2020-03-23 RX ADMIN — INSULIN LISPRO 4 UNITS: 100 INJECTION, SOLUTION INTRAVENOUS; SUBCUTANEOUS at 00:37

## 2020-03-23 RX ADMIN — SODIUM CHLORIDE: 9 INJECTION, SOLUTION INTRAVENOUS at 17:49

## 2020-03-23 RX ADMIN — REGADENOSON 0.4 MG: 0.08 INJECTION, SOLUTION INTRAVENOUS at 10:42

## 2020-03-23 RX ADMIN — Medication 10 ML: at 21:34

## 2020-03-23 RX ADMIN — INSULIN LISPRO 5 UNITS: 100 INJECTION, SOLUTION INTRAVENOUS; SUBCUTANEOUS at 21:34

## 2020-03-23 RX ADMIN — SODIUM CHLORIDE: 9 INJECTION, SOLUTION INTRAVENOUS at 00:40

## 2020-03-23 RX ADMIN — ASPIRIN 81 MG 81 MG: 81 TABLET ORAL at 13:40

## 2020-03-23 RX ADMIN — TETROFOSMIN 33.9 MILLICURIE: 1.38 INJECTION, POWDER, LYOPHILIZED, FOR SOLUTION INTRAVENOUS at 10:39

## 2020-03-23 RX ADMIN — INSULIN LISPRO 10 UNITS: 100 INJECTION, SOLUTION INTRAVENOUS; SUBCUTANEOUS at 17:46

## 2020-03-23 RX ADMIN — INSULIN LISPRO 6 UNITS: 100 INJECTION, SOLUTION INTRAVENOUS; SUBCUTANEOUS at 13:41

## 2020-03-23 RX ADMIN — ATORVASTATIN CALCIUM 40 MG: 40 TABLET, FILM COATED ORAL at 21:34

## 2020-03-23 ASSESSMENT — ENCOUNTER SYMPTOMS: SHORTNESS OF BREATH: 0

## 2020-03-23 NOTE — PLAN OF CARE
Pt remain free of skin breakdown during hospital stay. Pt encouraged to self turn frequently. Will assess skin per shift or as needed. Pt will remain free of falls during shift. Fall Precautions in place. Bed locked in lowest position. Call light within reach. Pt will use pain scale 0-10 appropriately. Will assess for pain frequently. Will medicate with pain meds as ordered.

## 2020-03-23 NOTE — PROGRESS NOTES
[facility]. In bed with eyes closed.  Jerri Tovar RN

## 2020-03-23 NOTE — FLOWSHEET NOTE
03/23/20 1239   Vital Signs   Temp 97.4 °F (36.3 °C)   Temp Source Oral   Pulse 67   Heart Rate Source Monitor   Resp 18   BP (!) 152/71   BP Location Right upper arm   BP Upper/Lower Upper   Patient Position Semi fowlers   Level of Consciousness 0   MEWS Score 1   Oxygen Therapy   SpO2 100 %   O2 Device None (Room air)   Pt received back to room 314-1. Pt denies any pain or needs. Call light within reach.

## 2020-03-23 NOTE — CARE COORDINATION
INTERDISCIPLINARY PLAN OF CARE CONFERENCE    Date/Time: 3/23/2020 4:53 PM  Completed by: Jennifer Mcelroy, Case Management      Patient Name:  Varsha Novoa  YOB: 1970  Admitting Diagnosis: Chest pain [R07.9]  Chest pain [R07.9]     Admit Date/Time:  3/21/2020  9:06 PM    Chart reviewed. Interdisciplinary team met to discuss patient progress and discharge plans. Disciplines included Case Management, Nursing, and Dietitian. Current Status:stable    PT/OT recommendation:NA    Anticipated Discharge Date: tbd  Expected D/C Disposition:  Nursing Home  Confirmed plan with patient and/or family Yes-pt  Discharge Plan Comments: Chart reviewed and role of dcp explained. Pt is wanting a NH for LTC. Pt is agreeable to whomever will accept. Referral to [facility]. Referral to Latonya Monroy @ [facility]. TC from [facility] in [address] and pt is agreeable to this facility and is willing to sign an iv drug user contract. Per Jessy Vega she can accept the pt and will start precert now.      The Plan for Transition of Care is related to the following treatment goals: LTC @ [address] O2 in place on admit: No

## 2020-03-23 NOTE — PROGRESS NOTES
Hospitalist Progress Note      PCP: No primary care provider on file. Date of Admission: 3/21/2020    Chief Complaint: chest pain. Meth abuse, homelessness. Subjective:   Reports being homeless. No further chest pain, stress test neg today  Willing to go to rehab if approved              Medications:  Reviewed    Infusion Medications    dextrose      sodium chloride 125 mL/hr at 03/23/20 0040     Scheduled Medications    aspirin  81 mg Oral Daily    buPROPion  150 mg Oral Daily    fenofibrate  160 mg Oral Daily    insulin glargine  20 Units Subcutaneous Nightly    losartan  50 mg Oral BID    nicotine  1 patch Transdermal Daily    pantoprazole  40 mg Oral Daily    QUEtiapine  50 mg Oral Nightly    sodium chloride flush  10 mL Intravenous 2 times per day    atorvastatin  40 mg Oral Nightly    insulin lispro  0-12 Units Subcutaneous Q4H    enoxaparin  40 mg Subcutaneous Daily     PRN Meds: sodium chloride flush, acetaminophen **OR** acetaminophen, polyethylene glycol, promethazine **OR** ondansetron, glucose, dextrose, glucagon (rDNA), dextrose, potassium chloride **OR** potassium alternative oral replacement **OR** potassium chloride, magnesium sulfate, nitroGLYCERIN      Intake/Output Summary (Last 24 hours) at 3/23/2020 1022  Last data filed at 3/23/2020 0842  Gross per 24 hour   Intake 4197.5 ml   Output 3450 ml   Net 747.5 ml       Physical Exam Performed:    BP (!) 146/75   Pulse 67   Temp 98.1 °F (36.7 °C) (Oral)   Resp 16   Ht 5' 10\" (1.778 m)   Wt 137 lb 9.6 oz (62.4 kg)   SpO2 98%   BMI 19.74 kg/m²         General:  Middle aged male, up in bed,   Awake, alert and oriented.  Appears to be not in any distress  Mucous Membranes:  Pink , anicteric  Neck: No JVD, no carotid bruit, no thyromegaly  Chest:  Clear to auscultation bilaterally, no added sounds  Cardiovascular:  RRR S1S2 heard, no murmurs or gallops  Abdomen:  Soft, undistended, non tender, no organomegaly, BS present  Extremities: No edema or cyanosis. Distal pulses well felt  Neurological : grossly normal        Labs:   Recent Labs     03/21/20 2122 03/22/20  0452   WBC 10.5 8.0   HGB 15.2 12.2*   HCT 47.7 36.5*    261     Recent Labs     03/21/20 2122 03/22/20  0452   * 132*   K 5.2* 3.6   CL 84* 102   CO2 25 20*   BUN 17 18   CREATININE 1.4* 1.1   CALCIUM 8.3 7.5*     Recent Labs     03/21/20 2122 03/22/20  0452   AST 30 17   ALT 40 24   BILIDIR <0.2  --    BILITOT 0.3 <0.2   ALKPHOS 229* 143*     No results for input(s): INR in the last 72 hours. Recent Labs     03/21/20 2122 03/21/20 2358 03/22/20  0231   CKTOTAL 246  --   --    TROPONINI <0.01 <0.01 <0.01       Urinalysis:      Lab Results   Component Value Date    NITRU Negative 03/21/2020    WBCUA 0-2 03/21/2020    BACTERIA 1+ 06/19/2018    RBCUA 3-4 03/21/2020    BLOODU Negative 03/21/2020    SPECGRAV <=1.005 03/21/2020    GLUCOSEU >=1000 03/21/2020    GLUCOSEU >=1000 10/10/2011       Radiology:  XR CHEST PORTABLE   Final Result   No acute process. NM Cardiac Stress Test Nuclear Imaging    (Results Pending)           Assessment/Plan:    Active Hospital Problems    Diagnosis    Chest pain [R07.9]       Chest pain  pain free now  Serial trop (-)x3  ACS ruled out   Stress test with no evidence of ischemia. Can dc home      IDDM non compliant with med regimen. w/ marked hyperglycemia   not taken insulin since July, no PCP  initial . Not in DKA. BG much better on present regimen of insulin   Recommend compliance and drug abuse cessation       HTN uncontrolled, 173/75 at arrival in ED. Resumed losartan     ARF - improved creatinine with IVF from 1.4 to 1. Cannot rule out ckd with uncontrolled DM      Active Hx of polysubstance abuse. Tox negative, but admits to \"ICE\" use (most potent form of methamphetamine). Meth only shows on urine tox for around 72 hours. Last use 4 days ago.     Tox has been positive in past for

## 2020-03-23 NOTE — DISCHARGE INSTR - COC
pain M79.605    Severe sepsis (HCC) A41.9, R65.20    IVDA (intravenous drug abuse) complicating pregnancy (HCC) O99.320, F19.10    Hyperglycemia R73.9    Sepsis (HCC) A41.9    Uncontrolled type 2 diabetes mellitus with hyperglycemia (HCC) E11.65    Severe episode of recurrent major depressive disorder, without psychotic features ([facility] [address] F33.2    Suicide risk R45.89    Chest pain R07.9    Substance abuse ([facility] F19.10       Isolation/Infection:   Isolation          No Isolation        Patient Infection Status     None to display          Nurse Assessment:  Last Vital Signs: BP (!) 158/73   Pulse 84   Temp 98.2 °F (36.8 °C) (Oral)   Resp 18   Ht 5' 10\" (1.778 m)   Wt 137 lb 9.6 oz (62.4 kg)   SpO2 98%   BMI 19.74 kg/m²     Last documented pain score (0-10 scale): Pain Level: 0  Last Weight:   Wt Readings from Last 1 Encounters:   03/23/20 137 lb 9.6 oz (62.4 kg)     Mental Status:  {IP PT MENTAL STATUS:20030}    IV Access:  { YAZMIN IV ACCESS:932093122}    Nursing Mobility/ADLs:  Walking   {P DME DMPM:128164635}  Transfer  {P DME GKLH:192746425}  Bathing  {CHP DME JYZJ:492287067}  Dressing  {CHP DME DUNG:806183728}  Toileting  {P DME BPKR:161628517}  Feeding  {[facility] DME XFXN:990803830}  Med Admin  {P DME VFTQ:327261469}  Med Delivery   { YAZMIN MED Delivery:769848575}    Wound Care Documentation and Therapy:  Incision 11/27/17 Arm Left (Active)   Number of days: 847        Elimination:  Continence:   · Bowel: {YES / JM:88744}  · Bladder: {YES / SV:57963}  Urinary Catheter: {Urinary Catheter:646079827}   Colostomy/Ileostomy/Ileal Conduit: {YES / DY:91375}       Date of Last BM: ***    Intake/Output Summary (Last 24 hours) at 3/23/2020 1623  Last data filed at 3/23/2020 1359  Gross per 24 hour   Intake 4357.5 ml   Output 3250 ml   Net 1107.5 ml     I/O last 3 completed shifts: In: 4357.5 [P.O.:720;  I.V.:3637.5]  Out: 3250 [Urine:3250]    Safety Concerns:     [address] YAZMIN Safety and transfer of Ledell Shaper  is necessary for the continuing treatment of the diagnosis listed and that he requires [place] for less 30 days.      Update Admission H&P: No change in H&P    PHYSICIAN SIGNATURE:  Electronically signed by Edward Auguste MD on 3/25/20 at 10:34 AM EDT

## 2020-03-23 NOTE — PROGRESS NOTES
Shift assessment complete. See flow sheet. Patient resting comfortably in bed eating a box lunch. Respirations easy and even. Meds given per MAR. Pt states no additional needs. Call light and bedside table in reach. Will follow.

## 2020-03-23 NOTE — PROGRESS NOTES
[facility]  Cardiology  Progress Note    Admission date:  3/21/2020    Reason for follow up visit: chest pain    HPI/CC: Hortensia Grove is a 54 y.o. male who presented to the hospital with complaints of chest pain. He has a PMH of polysubstance abuse/homelessness, hepatitis C, HLD, HTN, DM, tobacco abuse and biploar disorder. Troponin negative. EKG with nonspecific ST changes without obvious ischemia. He is undergoing stress test and echo today. Tele has been NSR. Subjective: Resting in bed in stress lab awaiting for stress test and echo. Denies chest pain, shortness of breath, palpitations and dizziness. Vitals:  Blood pressure (!) 146/75, pulse 67, temperature 98.1 °F (36.7 °C), temperature source Oral, resp. rate 16, height 5' 10\" (1.778 m), weight 137 lb 9.6 oz (62.4 kg), SpO2 98 %.   Temp  Av.9 °F (36.6 °C)  Min: 97.5 °F (36.4 °C)  Max: 98.2 °F (36.8 °C)  Pulse  Av.8  Min: 63  Max: 77  BP  Min: 132/62  Max: 146/75  SpO2  Av %  Min: 98 %  Max: 100 %    24 hour I/O    Intake/Output Summary (Last 24 hours) at 3/23/2020 0845  Last data filed at 3/23/2020 0645  Gross per 24 hour   Intake 4197.5 ml   Output 3450 ml   Net 747.5 ml     Current Facility-Administered Medications   Medication Dose Route Frequency Provider Last Rate Last Dose    aspirin chewable tablet 81 mg  81 mg Oral Daily Tarun Rosales MD   81 mg at 20    buPROPion (WELLBUTRIN XL) extended release tablet 150 mg  150 mg Oral Daily Tarun Rosales MD   150 mg at 20    fenofibrate (TRIGLIDE) tablet 160 mg  160 mg Oral Daily Tarun Rosales MD   160 mg at 20    insulin glargine (LANTUS) injection vial 20 Units  20 Units Subcutaneous Nightly Tarun Rosales MD   20 Units at 20    losartan (COZAAR) tablet 50 mg  50 mg Oral BID Tarun Rosales MD   50 mg at 20    nicotine (NICODERM CQ) 21 MG/24HR 1 patch  1 patch Transdermal Daily Ty Pay, MD   1 patch at 03/22/20 0012    pantoprazole (PROTONIX) tablet 40 mg  40 mg Oral Daily Papito William MD   Stopped at 03/23/20 0640    QUEtiapine (SEROQUEL) tablet 50 mg  50 mg Oral Nightly Papito William MD   50 mg at 03/22/20 2117    sodium chloride flush 0.9 % injection 10 mL  10 mL Intravenous 2 times per day Papito William MD        sodium chloride flush 0.9 % injection 10 mL  10 mL Intravenous PRN Papito William MD        acetaminophen (TYLENOL) tablet 650 mg  650 mg Oral Q6H PRN Papito William MD        Or   Francenia Emiliano acetaminophen (TYLENOL) suppository 650 mg  650 mg Rectal Q6H PRN Papito William MD        polyethylene glycol [facility]) packet 17 g  17 g Oral Daily PRN Papito William MD        promethazine (PHENERGAN) tablet 12.5 mg  12.5 mg Oral Q6H PRN Papito William MD        Or    ondansetron [facility]) injection 4 mg  4 mg Intravenous Q6H PRN Papito William MD        atorvastatin (LIPITOR) tablet 40 mg  40 mg Oral Nightly Papito William MD   40 mg at 03/22/20 2117    glucose (GLUTOSE) 40 % oral gel 15 g  15 g Oral PRN Papito William MD        dextrose 50 % IV solution  12.5 g Intravenous PRN Papito William MD        glucagon (rDNA) injection 1 mg  1 mg Intramuscular PRN Papito William MD        dextrose 5 % solution  100 mL/hr Intravenous PRN Papito William MD        0.9 % sodium chloride infusion   Intravenous Continuous Papito William  mL/hr at 03/23/20 0040      potassium chloride (KLOR-CON M) extended release tablet 40 mEq  40 mEq Oral PRN Papito William MD        Or    potassium bicarb-citric acid (EFFER-K) effervescent tablet 40 mEq  40 mEq Oral PRN Papito William MD        Or    potassium chloride 10 mEq/100 mL IVPB (Peripheral Line)  10 mEq Intravenous PRN Papito William MD        magnesium sulfate 2 g in 50 mL IVPB premix  2 g Intravenous PRN Papito William MD        insulin lispro (HUMALOG) injection vial 0-12 Units  0-12 abnormalities ok to discharge once medically stable.   3. Will consult case management regarding help with rehab programs per patient request.      Polly Delaney, APRN - [address] [facility]  [phone]

## 2020-03-23 NOTE — DISCHARGE SUMMARY
Name:  Jameson Sanots  Room:  /9933-48  MRN:    [id number]    Discharge Summary      This discharge summary is in conjunction with a complete physical exam done on the day of discharge. Discharging Physician: Dr. Carey Post: 3/21/2020  Discharge:   3/24/20     HPI taken from admission H&P:    The patient is a 54 y.o. male with PMH below, presents with chest pain, meth abuse, hyperglycemia, bilat hand and foot numbness, homelessness. Pt reports substernal CP that radiates down into both hands and feet but describes hand and feet as pins and needles. CP is described s pressure. Comes and goes. None currently. Pt is supposed to be on insulin but has not had any since July. He reports his BGT's have been \"high. \". He is also homeless and is using \"ice\" (meth). His last use was 4 days ago which was when his CP started. Diagnoses this Admission and Hospital Course     Chest pain  pain free now  Serial trop (-)x3  ACS unlikely. Stress test neg for ischemia      IDDM non compliant with med regimen. w/ marked hyperglycemia,  not taken insulin since July, no PCP  initial .  Not in DKA. BG much better on present regimen.         HTN uncontrolled, 173/75 at arrival in ED.  Resume losartan           CKD, Cr 1.4, likely stable, limited # but similar previous recent values.  Repeated after IVF and Crt 1.1         Active Hx of polysubstance abuse.  Tox negative, but admits to \"ICE\" use (most potent form of methamphetamine).    Meth only shows on urine tox for around 72 hours.  Last use 4 days ago.    Tox has been positive in past for amphetamines and MJ.         Tobacco Abuse, counseled cessation, 21 mg nicotine patch.        HLD, resume home regimen.         GERD, resume PPI and H2b.          Homelessness and limited access to care, SW c/s.     Procedures (Please Review Full Report for Details)  None     Consults    Cardiology     Physical Exam at Discharge:    BP (!) 152/71   Pulse 67 Temp 97.4 °F (36.3 °C) (Oral)   Resp 18   Ht 5' 10\" (1.778 m)   Wt 137 lb 9.6 oz (62.4 kg)   SpO2 100%   BMI 19.74 kg/m²       General:  Awake, alert and oriented. Appears to be not in any distress  Mucous Membranes:  Pink , anicteric  Neck: No JVD, no carotid bruit, no thyromegaly  Chest:  Clear to auscultation bilaterally, no added sounds  Cardiovascular:  RRR S1S2 heard, no murmurs or gallops  Abdomen:  Soft, undistended, non tender, no organomegaly, BS present  Extremities: No edema or cyanosis. Distal pulses well felt  Neurological : grossly normal        CBC:   Recent Labs     03/21/20 2122 03/22/20  0452   WBC 10.5 8.0   HGB 15.2 12.2*   HCT 47.7 36.5*   MCV 83.7 82.4    261     BMP:   Recent Labs     03/21/20 2122 03/22/20  0452   * 132*   K 5.2* 3.6   CL 84* 102   CO2 25 20*   BUN 17 18   CREATININE 1.4* 1.1     LIVER PROFILE:   Recent Labs     03/21/20 2122 03/22/20  0452   AST 30 17   ALT 40 24   BILIDIR <0.2  --    BILITOT 0.3 <0.2   ALKPHOS 229* 143*     UA:  Recent Labs     03/21/20  2220   COLORU Yellow   PHUR 6.5  6.5   WBCUA 0-2   RBCUA 3-4   CLARITYU Clear   SPECGRAV <=1.005   LEUKOCYTESUR Negative   UROBILINOGEN 0.2   BILIRUBINUR Negative   BLOODU Negative   GLUCOSEU >=1000*       CULTURES  None     RADIOLOGY  NM Cardiac Stress Test Nuclear Imaging   Final Result      XR CHEST PORTABLE   Final Result   No acute process. Stress test 3/23/20   Normal LV size and systolic function. Left ventricular ejection fraction of   75%. Normal wall motion. There is normal isotope uptake at stress and rest.   There is no evidence of myocardial ischemia or scar. Discharge Medications     Medication List      CHANGE how you take these medications    fenofibrate 145 MG tablet  Commonly known as:  TRICOR  What changed:  Another medication with the same name was removed. Continue taking this medication, and follow the directions you see here.         CONTINUE taking these medications Acetaminophen 325 MG Caps  Take 650 mg by mouth every 6 hours as needed (pain)     Alcohol Swabs Pads  1 each by Does not apply route 4 times daily     aspirin 81 MG tablet     blood glucose test strips strip  Commonly known as:  FREESTYLE LITE  Patient test three times daily due to fluctuating blood sugars  Diagnosis = 250.00 NIDDM     buPROPion 150 MG extended release tablet  Commonly known as:  WELLBUTRIN XL  Take 1 tablet by mouth daily     FreeStyle Lancets Misc  1 each by Does not apply route 4 times daily     glucose monitoring kit monitoring kit  1 kit by Does not apply route 4 times daily     * HumaLOG 100 UNIT/ML injection vial  Generic drug:  insulin lispro     * insulin lispro 100 UNIT/ML injection vial  Commonly known as:  HUMALOG  Inject 0-6 Units into the skin nightly     insulin glargine 100 UNIT/ML injection pen  Commonly known as:  Basaglar KwikPen  Inject 20 Units into the skin nightly     Insulin Pen Needle 29G X 12.7MM Misc  1 each by Does not apply route 4 times daily     INSULIN SYRINGE .5CC/29G 29G X 1/2\" 0.5 ML Misc  1 each by Does not apply route daily     losartan 50 MG tablet  Commonly known as:  Cozaar  Take 1 tablet by mouth 2 times daily     nicotine 21 MG/24HR  Commonly known as:  NICODERM CQ  Place 1 patch onto the skin daily     pantoprazole 40 MG tablet  Commonly known as:  PROTONIX  Take 1 tablet by mouth daily     QUEtiapine 50 MG tablet  Commonly known as:  SEROQUEL  Take 1 tablet by mouth nightly     raNITIdine 150 MG tablet  Commonly known as:  ZANTAC  Take 1 tablet by mouth 2 times daily         * This list has 2 medication(s) that are the same as other medications prescribed for you. Read the directions carefully, and ask your doctor or other care provider to review them with you. Discharged in stable condition to home    Follow Up:   Follow up with PCP in 1 week     Lydia Amezcua MD 3/24/20

## 2020-03-24 LAB
GLUCOSE BLD-MCNC: 228 MG/DL (ref 70–99)
GLUCOSE BLD-MCNC: 254 MG/DL (ref 70–99)
GLUCOSE BLD-MCNC: 256 MG/DL (ref 70–99)
GLUCOSE BLD-MCNC: 259 MG/DL (ref 70–99)
GLUCOSE BLD-MCNC: 308 MG/DL (ref 70–99)
PERFORMED ON: ABNORMAL

## 2020-03-24 PROCEDURE — 6370000000 HC RX 637 (ALT 250 FOR IP): Performed by: INTERNAL MEDICINE

## 2020-03-24 PROCEDURE — G0378 HOSPITAL OBSERVATION PER HR: HCPCS

## 2020-03-24 PROCEDURE — 96361 HYDRATE IV INFUSION ADD-ON: CPT

## 2020-03-24 PROCEDURE — 2580000003 HC RX 258: Performed by: INTERNAL MEDICINE

## 2020-03-24 PROCEDURE — 6360000002 HC RX W HCPCS: Performed by: INTERNAL MEDICINE

## 2020-03-24 PROCEDURE — 99224 PR SBSQ OBSERVATION CARE/DAY 15 MINUTES: CPT | Performed by: INTERNAL MEDICINE

## 2020-03-24 PROCEDURE — 96372 THER/PROPH/DIAG INJ SC/IM: CPT

## 2020-03-24 RX ORDER — INSULIN GLARGINE 100 [IU]/ML
20 INJECTION, SOLUTION SUBCUTANEOUS 2 TIMES DAILY
Status: DISCONTINUED | OUTPATIENT
Start: 2020-03-24 | End: 2020-03-25 | Stop reason: HOSPADM

## 2020-03-24 RX ORDER — NICOTINE 21 MG/24HR
1 PATCH, TRANSDERMAL 24 HOURS TRANSDERMAL DAILY
Qty: 30 PATCH | Refills: 0 | Status: ON HOLD | OUTPATIENT
Start: 2020-03-24 | End: 2020-06-19 | Stop reason: HOSPADM

## 2020-03-24 RX ORDER — IBUPROFEN 200 MG
1 TABLET ORAL DAILY
Qty: 100 EACH | Refills: 3 | Status: ON HOLD | OUTPATIENT
Start: 2020-03-24 | End: 2020-06-19 | Stop reason: SDUPTHER

## 2020-03-24 RX ORDER — LANCETS 28 GAUGE
1 EACH MISCELLANEOUS 4 TIMES DAILY
Qty: 150 EACH | Refills: 1 | Status: ON HOLD | OUTPATIENT
Start: 2020-03-24 | End: 2020-06-19 | Stop reason: SDUPTHER

## 2020-03-24 RX ORDER — INSULIN GLARGINE 100 [IU]/ML
20 INJECTION, SOLUTION SUBCUTANEOUS 2 TIMES DAILY
Qty: 5 PEN | Refills: 0
Start: 2020-03-24 | End: 2020-03-24 | Stop reason: SDUPTHER

## 2020-03-24 RX ORDER — QUETIAPINE FUMARATE 50 MG/1
50 TABLET, FILM COATED ORAL NIGHTLY
Qty: 30 TABLET | Refills: 0 | Status: ON HOLD | OUTPATIENT
Start: 2020-03-24 | End: 2020-06-19 | Stop reason: HOSPADM

## 2020-03-24 RX ORDER — INSULIN GLARGINE 100 [IU]/ML
20 INJECTION, SOLUTION SUBCUTANEOUS 2 TIMES DAILY
Qty: 5 PEN | Refills: 0 | Status: ON HOLD | OUTPATIENT
Start: 2020-03-24 | End: 2020-06-19 | Stop reason: HOSPADM

## 2020-03-24 RX ORDER — BLOOD-GLUCOSE METER
KIT MISCELLANEOUS
Qty: 100 STRIP | Refills: 1 | Status: ON HOLD | OUTPATIENT
Start: 2020-03-24 | End: 2020-06-19 | Stop reason: SDUPTHER

## 2020-03-24 RX ORDER — LOSARTAN POTASSIUM 50 MG/1
50 TABLET ORAL 2 TIMES DAILY
Qty: 30 TABLET | Refills: 1 | Status: ON HOLD | OUTPATIENT
Start: 2020-03-24 | End: 2020-06-19 | Stop reason: HOSPADM

## 2020-03-24 RX ORDER — FENOFIBRATE 145 MG/1
145 TABLET, COATED ORAL DAILY
Qty: 30 TABLET | Refills: 1 | Status: ON HOLD | OUTPATIENT
Start: 2020-03-24 | End: 2020-06-19 | Stop reason: SDUPTHER

## 2020-03-24 RX ORDER — BLOOD-GLUCOSE METER
1 KIT MISCELLANEOUS 4 TIMES DAILY
Qty: 1 KIT | Refills: 0 | Status: ON HOLD | OUTPATIENT
Start: 2020-03-24 | End: 2020-06-19 | Stop reason: SDUPTHER

## 2020-03-24 RX ORDER — BLOOD PRESSURE TEST KIT
1 KIT MISCELLANEOUS 4 TIMES DAILY
Qty: 150 EACH | Refills: 1 | Status: ON HOLD | OUTPATIENT
Start: 2020-03-24 | End: 2020-06-19 | Stop reason: SDUPTHER

## 2020-03-24 RX ORDER — BUPROPION HYDROCHLORIDE 150 MG/1
150 TABLET ORAL DAILY
Qty: 30 TABLET | Refills: 0 | Status: ON HOLD | OUTPATIENT
Start: 2020-03-24 | End: 2020-06-19 | Stop reason: HOSPADM

## 2020-03-24 RX ORDER — RANITIDINE 150 MG/1
150 TABLET ORAL 2 TIMES DAILY
Qty: 60 TABLET | Refills: 0 | Status: ON HOLD | OUTPATIENT
Start: 2020-03-24 | End: 2020-06-19 | Stop reason: HOSPADM

## 2020-03-24 RX ORDER — PANTOPRAZOLE SODIUM 40 MG/1
40 TABLET, DELAYED RELEASE ORAL DAILY
Qty: 30 TABLET | Refills: 0 | Status: ON HOLD | OUTPATIENT
Start: 2020-03-24 | End: 2020-06-19 | Stop reason: SDUPTHER

## 2020-03-24 RX ADMIN — ENOXAPARIN SODIUM 40 MG: 40 INJECTION SUBCUTANEOUS at 08:19

## 2020-03-24 RX ADMIN — INSULIN GLARGINE 20 UNITS: 100 INJECTION, SOLUTION SUBCUTANEOUS at 08:23

## 2020-03-24 RX ADMIN — SODIUM CHLORIDE: 9 INJECTION, SOLUTION INTRAVENOUS at 06:26

## 2020-03-24 RX ADMIN — INSULIN LISPRO 6 UNITS: 100 INJECTION, SOLUTION INTRAVENOUS; SUBCUTANEOUS at 17:08

## 2020-03-24 RX ADMIN — INSULIN LISPRO 6 UNITS: 100 INJECTION, SOLUTION INTRAVENOUS; SUBCUTANEOUS at 08:22

## 2020-03-24 RX ADMIN — FENOFIBRATE 160 MG: 160 TABLET ORAL at 08:19

## 2020-03-24 RX ADMIN — ASPIRIN 81 MG 81 MG: 81 TABLET ORAL at 08:19

## 2020-03-24 RX ADMIN — Medication 10 ML: at 21:05

## 2020-03-24 RX ADMIN — INSULIN LISPRO 6 UNITS: 100 INJECTION, SOLUTION INTRAVENOUS; SUBCUTANEOUS at 11:59

## 2020-03-24 RX ADMIN — INSULIN GLARGINE 20 UNITS: 100 INJECTION, SOLUTION SUBCUTANEOUS at 21:04

## 2020-03-24 RX ADMIN — LOSARTAN POTASSIUM 50 MG: 25 TABLET, FILM COATED ORAL at 21:05

## 2020-03-24 RX ADMIN — QUETIAPINE FUMARATE 50 MG: 25 TABLET ORAL at 21:05

## 2020-03-24 RX ADMIN — INSULIN LISPRO 4 UNITS: 100 INJECTION, SOLUTION INTRAVENOUS; SUBCUTANEOUS at 21:04

## 2020-03-24 RX ADMIN — LOSARTAN POTASSIUM 50 MG: 25 TABLET, FILM COATED ORAL at 08:19

## 2020-03-24 RX ADMIN — Medication 10 ML: at 08:19

## 2020-03-24 RX ADMIN — ATORVASTATIN CALCIUM 40 MG: 40 TABLET, FILM COATED ORAL at 21:05

## 2020-03-24 RX ADMIN — BUPROPION HYDROCHLORIDE 150 MG: 150 TABLET, FILM COATED, EXTENDED RELEASE ORAL at 08:19

## 2020-03-24 NOTE — PLAN OF CARE
Problem: Falls - Risk of:  Goal: Will remain free from falls  Description: Will remain free from falls  3/24/2020 0236 by Naomy Gill RN  Outcome: Ongoing  3/23/2020 1829 by Goldie Graham RN  Outcome: Ongoing  Goal: Absence of physical injury  Description: Absence of physical injury  Outcome: Ongoing     Problem: Infection:  Goal: Will remain free from infection  Description: Will remain free from infection  3/24/2020 0236 by Naomy Gill RN  Outcome: Ongoing  3/23/2020 1829 by Goldie Graham RN  Outcome: Ongoing     Problem: Safety:  Goal: Free from accidental physical injury  Description: Free from accidental physical injury  Outcome: Ongoing  Goal: Free from intentional harm  Description: Free from intentional harm  Outcome: Ongoing     Problem: Daily Care:  Goal: Daily care needs are met  Description: Daily care needs are met  3/24/2020 0236 by Naomy Gill RN  Outcome: Ongoing  3/23/2020 1829 by Goldie Graham RN  Outcome: Ongoing     Problem: Pain:  Goal: Patient's pain/discomfort is manageable  Description: Patient's pain/discomfort is manageable  3/24/2020 0236 by Naomy Gill RN  Outcome: Ongoing  Note: Pt denies all pain so far this shift  3/23/2020 1829 by Goldie Graham RN  Outcome: Ongoing     Problem: Skin Integrity:  Goal: Skin integrity will stabilize  Description: Skin integrity will stabilize  3/24/2020 0236 by Naomy Gill RN  Outcome: Ongoing  3/23/2020 1829 by Goldie Graham RN  Outcome: Ongoing     Problem: Discharge Planning:  Goal: Patients continuum of care needs are met  Description: Patients continuum of care needs are met  Outcome: Ongoing  Note: Awaiting pre-certification for rehab placement

## 2020-03-24 NOTE — PROGRESS NOTES
Spoke with case management they state that precert has not come back. Pt. Needs scripts for all home medications. Allan Yang PA here to sign 17 scripts. Informed her that pt. Does not have a place to go at this time and will not be able to afford all of his medications. Allan Yang spoke with Dr. Eder Sheets. Pt. To stay to see if precert to come back tomorrow. If pt. Not accepted pt. Will need scripts filled at meds to beds. Also pt. My need assistance to be placed in a homeless shelter. Spoke with Sam Morales from case management and updated her.  Beni Gupta RN

## 2020-03-24 NOTE — PROGRESS NOTES
Pt. Awake in bed. Dr. Kamran Puckett in room to see pt. States okay to remove tele monitor at this time. Pt. Assessment completed- see flow sheet. Pt. denies further needs at this time. Call light is within reach.   Michael Park RN

## 2020-03-25 VITALS
BODY MASS INDEX: 20.07 KG/M2 | DIASTOLIC BLOOD PRESSURE: 78 MMHG | TEMPERATURE: 97.1 F | HEIGHT: 70 IN | RESPIRATION RATE: 16 BRPM | OXYGEN SATURATION: 99 % | SYSTOLIC BLOOD PRESSURE: 154 MMHG | WEIGHT: 140.2 LBS | HEART RATE: 66 BPM

## 2020-03-25 LAB
GLUCOSE BLD-MCNC: 151 MG/DL (ref 70–99)
GLUCOSE BLD-MCNC: 201 MG/DL (ref 70–99)
GLUCOSE BLD-MCNC: 312 MG/DL (ref 70–99)
PERFORMED ON: ABNORMAL

## 2020-03-25 PROCEDURE — 6360000002 HC RX W HCPCS: Performed by: INTERNAL MEDICINE

## 2020-03-25 PROCEDURE — 99217 PR OBSERVATION CARE DISCHARGE MANAGEMENT: CPT | Performed by: INTERNAL MEDICINE

## 2020-03-25 PROCEDURE — 96372 THER/PROPH/DIAG INJ SC/IM: CPT

## 2020-03-25 PROCEDURE — 6370000000 HC RX 637 (ALT 250 FOR IP): Performed by: INTERNAL MEDICINE

## 2020-03-25 PROCEDURE — G0378 HOSPITAL OBSERVATION PER HR: HCPCS

## 2020-03-25 PROCEDURE — 2580000003 HC RX 258: Performed by: INTERNAL MEDICINE

## 2020-03-25 RX ADMIN — BUPROPION HYDROCHLORIDE 150 MG: 150 TABLET, FILM COATED, EXTENDED RELEASE ORAL at 08:00

## 2020-03-25 RX ADMIN — Medication 10 ML: at 08:00

## 2020-03-25 RX ADMIN — PANTOPRAZOLE SODIUM 40 MG: 40 TABLET, DELAYED RELEASE ORAL at 06:45

## 2020-03-25 RX ADMIN — ENOXAPARIN SODIUM 40 MG: 40 INJECTION SUBCUTANEOUS at 08:00

## 2020-03-25 RX ADMIN — LOSARTAN POTASSIUM 50 MG: 25 TABLET, FILM COATED ORAL at 08:00

## 2020-03-25 RX ADMIN — INSULIN LISPRO 4 UNITS: 100 INJECTION, SOLUTION INTRAVENOUS; SUBCUTANEOUS at 08:04

## 2020-03-25 RX ADMIN — ASPIRIN 81 MG 81 MG: 81 TABLET ORAL at 08:00

## 2020-03-25 RX ADMIN — FENOFIBRATE 160 MG: 160 TABLET ORAL at 08:00

## 2020-03-25 RX ADMIN — INSULIN GLARGINE 20 UNITS: 100 INJECTION, SOLUTION SUBCUTANEOUS at 08:04

## 2020-03-25 NOTE — PROGRESS NOTES
EOS report and transfer of care to [facility], [facility]. Patient resting in bed, stable at this time.

## 2020-03-25 NOTE — PROGRESS NOTES
Shift assessment complete as charted. VSS. No longer on telemetry per MD orders. He denies pain. A/Ox4. Unlabored breathing at rest on room air. Discussed elevated glucoses and carb control diet. Talked about insulin use, fingerstick glucose checks, and sick day management. He reports that he intermittently has burning sensation to bilateral feet. On inspection, feet appear calloused and dry. Also discussed diabetes and neuropathy. Comfort measures provided. Meds given as per MAR. Safety measures in place and will continue to monitor.

## 2020-03-25 NOTE — DISCHARGE SUMMARY
Name:  Skylar Martin  Room:  /8091-26  MRN:    [id number]    Discharge Summary      This discharge summary is in conjunction with a complete physical exam done on the day of discharge. Discharging Physician: Dr. Zoraida Marroquin: 3/21/2020  Discharge:   3/25/20     HPI taken from admission H&P:      The patient is a 54 y.o. male with PMH below, presents with chest pain, meth abuse, hyperglycemia, bilat hand and foot numbness, homelessness. Pt reports substernal CP that radiates down into both hands and feet but describes hand and feet as pins and needles. CP is described s pressure. Comes and goes. None currently. Pt is supposed to be on insulin but has not had any since July. He reports his BGT's have been \"high. \". He is also homeless and is using \"ice\" (meth). His last use was 4 days ago which was when his CP started. Diagnoses this Admission and Hospital Course     Chest pain  pain free now  Serial trop (-)x3  ACS unlikely. Stress test neg for ischemia      IDDM non compliant with med regimen. w/ marked hyperglycemia,  not taken insulin since July, no PCP  initial .  Not in DKA. BG much better on present regimen.         HTN uncontrolled, 173/75 at arrival in ED.  Resume losartan           CKD, Cr 1.4, likely stable, limited # but similar previous recent values.  Repeated after IVF and Crt 1.1         Active Hx of polysubstance abuse.  Tox negative, but admits to \"ICE\" use (most potent form of methamphetamine).    Meth only shows on urine tox for around 72 hours.  Last use 4 days ago.    Tox has been positive in past for amphetamines and MJ.         Tobacco Abuse, counseled cessation, 21 mg nicotine patch.        HLD, resume home regimen.         GERD, resume PPI and H2b.          Homelessness and limited access to care, SW c/s.     Procedures (Please Review Full Report for Details)  None     Consults    Cardiology     Physical Exam at Discharge:    BP (!) 154/78   Pulse 66

## 2020-03-25 NOTE — CARE COORDINATION
DISCHARGE ORDER  Date/Time 3/25/2020 1:28 PM  Completed by: Trey Estrella, Case Management    Patient Name: Marlena Ortiz    : 1970    Admitting Diagnosis: Chest pain [R07.9]  Chest pain [R07.9]    Financial Payer Type : Brianna Coffey order Date and Status:3/21/2020 obs  (verify MD's last order for status of admission/Traditional Medicare 3 day qualifying stay required for SNF)    Noted discharge order. Confirmed discharge plan   Yes  with whom___pt____________  If pt confirmed DC plan does family need to be contacted by CM No  Discharge Plan: Chart reviewed and role of dcp explained. Met with pt at bedside. Pt remains agreeable to LTC @ [facility] and is aware of  time. Precert has returned. TC from [facility] [phone] and she is aware of  time. Pt is being d/c'd to [facility]  today. Pt's O2 sats are 99% on RA. Discharge orders and Continuity of Care faxed to facility: Yes  Hospital Exemption Notification System complete: Yes-PASRR  Transportation arranged: Yes - [facility]  Pick-up @ 1500. Patient / Family (pt) aware of  time: Yes   Nursing aware of  time: Yes  Receiving facility aware of  time: Yes-[facility]  Pre-cert obtained? Yes    Discharge timeout done with [employer]. All discharge needs and concerns addressed. Addendum Called in to room by pt who states he just received a call from his son that he has not spoke to in a long time who is offering to let pt live with him. Explained to pt that if he gives up this precert for LTC that he may not get that opportunity again and pt v/u but still wants to go with son. Pt requesting scripts sent to [facility] which is done at this time. Call to [facility] [employer] to cancel transport spoke to Ivanna Mehta. TC to [facility] and she is aware pt is no longer coming.  No further dc needs voiced or identified. Discharging nurse to complete YAZMIN, reconcile AVS, and place final copy with patient's discharge packet. Discharging RN to ensure that written prescriptions for  Level II medications are sent with patient to the facility as per protocol.

## 2020-03-25 NOTE — PROGRESS NOTES
Notified by case management that patient is refusing to go to [facility] and states he is going to go stay with his son. Patient stated that his ride is 10 minutes away and does not want to wait for meds to beds to fill his prescriptions. Patient notified that he has multiple prescriptions that need to be filled including insulin prescription that he will need to take to a pharmacy to get filled on his own. Patient verbalized understanding. Primary RN and case management RN notified and aware. ERIKA EidN, RN.

## 2020-04-03 NOTE — CARE COORDINATION
Pt was discharged from the hospital on 3/25/2020. Pt called stating that his medications are not covered and he has been without his medications since. Pt states that his medications are at [facility] ([phone]). CM spoke with Conchis Escobar with [facility] and she ran the medications and states that the [employer] Pen is not covered  But that the Lantus [employer] is. Humalog is not covered, but that the insulin lispro is. Conchis Escobar states that this shoul d be covered 100%---no copay. CM informed KARELY Mcadams, of this and she verbalized agreeance to change tot he approved meds. CM called [facility] [phone]) and spoke with the pharmacist, Medardo Meeks. CM gave verbal order to change to Basaglar Kwik Pen to Lantus Filastar. Also to change Humalog to insulin lispro. Colleen RN, (Charge Rn) called and informed pt of this. No further needs needed from [facility].

## 2020-04-10 ENCOUNTER — TELEPHONE (OUTPATIENT)
Dept: INTERNAL MEDICINE CLINIC | Age: 50
End: 2020-04-10

## 2020-04-10 NOTE — TELEPHONE ENCOUNTER
----- Message from Summer Farley MD sent at 4/9/2020  4:30 PM EDT -----  Contact: milagros Henao  ----- Message -----  From: Jeana Huizar  Sent: 4/9/2020   3:22 PM EDT  To: Summer Farley MD    Same dose as basaglar or humalog?  ----- Message -----  From: Summer Farley MD  Sent: 4/9/2020   3:04 PM EDT  To: Jorge Stacy Ozzy    Choco same dose  ----- Message -----  From: Jeana Huizar  Sent: 4/9/2020   2:15 PM EDT  To: Summer Farley MD    Pharmacy would need prescription sent to them to see if covered. ----- Message -----  From: Summer Farley MD  Sent: 4/9/2020   1:43 PM EDT  To: Jeana Huizar    Can we call pharmacy and see which other ones are covered- lantus, levemir, basaglar without PA  ----- Message -----  From: Afua Florian  Sent: 4/9/2020   8:13 AM EDT  To: Summer Farley MD      ----- Message -----  From: KARELY Yusuf  Sent: 4/1/2020   2:14 PM EDT  To: Afua Chapa was the discharging provider for this patient, can you send this to him to see if he can help? Thanks   ----- Message -----  From: Harini Wong  Sent: 3/30/2020   1:54 PM EDT  To: Blanca Marion pts friend called because he needs a PA for his insulin glargine (BASAGLAR KWIKPEN) 100 UNIT/ML injection pen and HUMALOG 100 UNIT/ML injection vial. Jose A Mendoza states he needs this filled as soon as he is able to get it.        Pharmacy-[facility]   milagros GONZALEZ-[phone]

## 2020-06-12 ENCOUNTER — HOSPITAL ENCOUNTER (INPATIENT)
Age: 50
LOS: 6 days | Discharge: HOME | DRG: 753 | End: 2020-06-19
Attending: EMERGENCY MEDICINE | Admitting: PSYCHIATRY & NEUROLOGY
Payer: MEDICAID

## 2020-06-12 LAB
A/G RATIO: 1.3 (ref 1.1–2.2)
ACETAMINOPHEN LEVEL: <5 UG/ML (ref 10–30)
ALBUMIN SERPL-MCNC: 3.9 G/DL (ref 3.4–5)
ALP BLD-CCNC: 136 U/L (ref 40–129)
ALT SERPL-CCNC: 21 U/L (ref 10–40)
ANION GAP SERPL CALCULATED.3IONS-SCNC: 13 MMOL/L (ref 3–16)
AST SERPL-CCNC: 17 U/L (ref 15–37)
BASOPHILS ABSOLUTE: 0.1 K/UL (ref 0–0.2)
BASOPHILS RELATIVE PERCENT: 1.4 %
BILIRUB SERPL-MCNC: 0.3 MG/DL (ref 0–1)
BUN BLDV-MCNC: 11 MG/DL (ref 7–20)
CALCIUM SERPL-MCNC: 10 MG/DL (ref 8.3–10.6)
CHLORIDE BLD-SCNC: 88 MMOL/L (ref 99–110)
CO2: 25 MMOL/L (ref 21–32)
CREAT SERPL-MCNC: 1.1 MG/DL (ref 0.9–1.3)
EOSINOPHILS ABSOLUTE: 0.4 K/UL (ref 0–0.6)
EOSINOPHILS RELATIVE PERCENT: 4.1 %
ETHANOL: NORMAL MG/DL (ref 0–0.08)
GFR AFRICAN AMERICAN: >60
GFR NON-AFRICAN AMERICAN: >60
GLOBULIN: 3 G/DL
GLUCOSE BLD-MCNC: 393 MG/DL (ref 70–99)
GLUCOSE BLD-MCNC: 393 MG/DL (ref 70–99)
HCT VFR BLD CALC: 45.2 % (ref 40.5–52.5)
HEMOGLOBIN: 15.6 G/DL (ref 13.5–17.5)
LYMPHOCYTES ABSOLUTE: 3 K/UL (ref 1–5.1)
LYMPHOCYTES RELATIVE PERCENT: 28.7 %
MAGNESIUM: 1.6 MG/DL (ref 1.8–2.4)
MCH RBC QN AUTO: 28.5 PG (ref 26–34)
MCHC RBC AUTO-ENTMCNC: 34.4 G/DL (ref 31–36)
MCV RBC AUTO: 82.9 FL (ref 80–100)
MONOCYTES ABSOLUTE: 0.9 K/UL (ref 0–1.3)
MONOCYTES RELATIVE PERCENT: 8.6 %
NEUTROPHILS ABSOLUTE: 6 K/UL (ref 1.7–7.7)
NEUTROPHILS RELATIVE PERCENT: 57.2 %
PDW BLD-RTO: 12.6 % (ref 12.4–15.4)
PERFORMED ON: ABNORMAL
PLATELET # BLD: 266 K/UL (ref 135–450)
PMV BLD AUTO: 9.2 FL (ref 5–10.5)
POTASSIUM REFLEX MAGNESIUM: 3.3 MMOL/L (ref 3.5–5.1)
RBC # BLD: 5.46 M/UL (ref 4.2–5.9)
SALICYLATE, SERUM: <0.3 MG/DL (ref 15–30)
SODIUM BLD-SCNC: 126 MMOL/L (ref 136–145)
TOTAL PROTEIN: 6.9 G/DL (ref 6.4–8.2)
WBC # BLD: 10.5 K/UL (ref 4–11)

## 2020-06-12 PROCEDURE — 36415 COLL VENOUS BLD VENIPUNCTURE: CPT

## 2020-06-12 PROCEDURE — 84443 ASSAY THYROID STIM HORMONE: CPT

## 2020-06-12 PROCEDURE — G0480 DRUG TEST DEF 1-7 CLASSES: HCPCS

## 2020-06-12 PROCEDURE — 96372 THER/PROPH/DIAG INJ SC/IM: CPT

## 2020-06-12 PROCEDURE — 83735 ASSAY OF MAGNESIUM: CPT

## 2020-06-12 PROCEDURE — 99285 EMERGENCY DEPT VISIT HI MDM: CPT

## 2020-06-12 PROCEDURE — 85025 COMPLETE CBC W/AUTO DIFF WBC: CPT

## 2020-06-12 PROCEDURE — 80053 COMPREHEN METABOLIC PANEL: CPT

## 2020-06-12 RX ORDER — 0.9 % SODIUM CHLORIDE 0.9 %
1000 INTRAVENOUS SOLUTION INTRAVENOUS ONCE
Status: COMPLETED | OUTPATIENT
Start: 2020-06-12 | End: 2020-06-13

## 2020-06-12 RX ORDER — MAGNESIUM SULFATE IN WATER 40 MG/ML
2 INJECTION, SOLUTION INTRAVENOUS ONCE
Status: COMPLETED | OUTPATIENT
Start: 2020-06-12 | End: 2020-06-13

## 2020-06-12 RX ORDER — POTASSIUM CHLORIDE 20 MEQ/1
40 TABLET, EXTENDED RELEASE ORAL ONCE
Status: COMPLETED | OUTPATIENT
Start: 2020-06-12 | End: 2020-06-13

## 2020-06-12 ASSESSMENT — PATIENT HEALTH QUESTIONNAIRE - PHQ9: SUM OF ALL RESPONSES TO PHQ QUESTIONS 1-9: 27

## 2020-06-13 PROBLEM — F32.9 MAJOR DEPRESSIVE DISORDER WITHOUT PSYCHOTIC FEATURES: Status: ACTIVE | Noted: 2020-06-13

## 2020-06-13 PROBLEM — F15.10 AMPHETAMINE ABUSE (HCC): Status: ACTIVE | Noted: 2020-06-13

## 2020-06-13 LAB
AMPHETAMINE SCREEN, URINE: POSITIVE
ANION GAP SERPL CALCULATED.3IONS-SCNC: 10 MMOL/L (ref 3–16)
BARBITURATE SCREEN URINE: ABNORMAL
BASE EXCESS VENOUS: 1.8 MMOL/L (ref -3–3)
BENZODIAZEPINE SCREEN, URINE: ABNORMAL
BILIRUBIN URINE: NEGATIVE
BLOOD, URINE: NEGATIVE
BUN BLDV-MCNC: 11 MG/DL (ref 7–20)
CALCIUM SERPL-MCNC: 8.5 MG/DL (ref 8.3–10.6)
CANNABINOID SCREEN URINE: ABNORMAL
CARBOXYHEMOGLOBIN: 4.8 % (ref 0–1.5)
CHLORIDE BLD-SCNC: 98 MMOL/L (ref 99–110)
CHP ED QC CHECK: YES
CHP ED QC CHECK: YES
CLARITY: CLEAR
CO2: 22 MMOL/L (ref 21–32)
COCAINE METABOLITE SCREEN URINE: ABNORMAL
COLOR: YELLOW
CREAT SERPL-MCNC: 0.9 MG/DL (ref 0.9–1.3)
EPITHELIAL CELLS, UA: NORMAL /HPF (ref 0–5)
GFR AFRICAN AMERICAN: >60
GFR NON-AFRICAN AMERICAN: >60
GLUCOSE BLD-MCNC: 170 MG/DL (ref 70–99)
GLUCOSE BLD-MCNC: 191 MG/DL (ref 70–99)
GLUCOSE BLD-MCNC: 232 MG/DL
GLUCOSE BLD-MCNC: 232 MG/DL
GLUCOSE BLD-MCNC: 232 MG/DL (ref 70–99)
GLUCOSE BLD-MCNC: 256 MG/DL (ref 70–99)
GLUCOSE BLD-MCNC: 334 MG/DL (ref 70–99)
GLUCOSE BLD-MCNC: 385 MG/DL (ref 70–99)
GLUCOSE BLD-MCNC: 94 MG/DL (ref 70–99)
GLUCOSE URINE: >=1000 MG/DL
HCO3 VENOUS: 26.6 MMOL/L (ref 23–29)
KETONES, URINE: NEGATIVE MG/DL
LEUKOCYTE ESTERASE, URINE: NEGATIVE
Lab: ABNORMAL
MAGNESIUM: 1.5 MG/DL (ref 1.8–2.4)
METHADONE SCREEN, URINE: ABNORMAL
METHEMOGLOBIN VENOUS: 0.3 %
MICROSCOPIC EXAMINATION: YES
NITRITE, URINE: NEGATIVE
O2 CONTENT, VEN: 19 VOL %
O2 SAT, VEN: 98 %
O2 THERAPY: ABNORMAL
OPIATE SCREEN URINE: ABNORMAL
OXYCODONE URINE: ABNORMAL
PCO2, VEN: 42.7 MMHG (ref 40–50)
PERFORMED ON: ABNORMAL
PERFORMED ON: NORMAL
PH UA: 6
PH UA: 6 (ref 5–8)
PH VENOUS: 7.41 (ref 7.35–7.45)
PHENCYCLIDINE SCREEN URINE: ABNORMAL
PO2, VEN: 105.8 MMHG (ref 25–40)
POTASSIUM SERPL-SCNC: 3.1 MMOL/L (ref 3.5–5.1)
PROPOXYPHENE SCREEN: ABNORMAL
PROTEIN UA: 100 MG/DL
RBC UA: NORMAL /HPF (ref 0–4)
SARS-COV-2, NAAT: NOT DETECTED
SODIUM BLD-SCNC: 130 MMOL/L (ref 136–145)
SPECIFIC GRAVITY UA: 1.01 (ref 1–1.03)
TCO2 CALC VENOUS: 28 MMOL/L
TSH SERPL DL<=0.05 MIU/L-ACNC: 1.58 UIU/ML (ref 0.27–4.2)
URINE TYPE: ABNORMAL
UROBILINOGEN, URINE: 0.2 E.U./DL
WBC UA: NORMAL /HPF (ref 0–5)

## 2020-06-13 PROCEDURE — 80048 BASIC METABOLIC PNL TOTAL CA: CPT

## 2020-06-13 PROCEDURE — 6370000000 HC RX 637 (ALT 250 FOR IP)

## 2020-06-13 PROCEDURE — 80307 DRUG TEST PRSMV CHEM ANLYZR: CPT

## 2020-06-13 PROCEDURE — 6370000000 HC RX 637 (ALT 250 FOR IP): Performed by: PSYCHIATRY & NEUROLOGY

## 2020-06-13 PROCEDURE — 6370000000 HC RX 637 (ALT 250 FOR IP): Performed by: NURSE PRACTITIONER

## 2020-06-13 PROCEDURE — 6360000002 HC RX W HCPCS: Performed by: EMERGENCY MEDICINE

## 2020-06-13 PROCEDURE — U0002 COVID-19 LAB TEST NON-CDC: HCPCS

## 2020-06-13 PROCEDURE — 82803 BLOOD GASES ANY COMBINATION: CPT

## 2020-06-13 PROCEDURE — 81001 URINALYSIS AUTO W/SCOPE: CPT

## 2020-06-13 PROCEDURE — 2580000003 HC RX 258: Performed by: EMERGENCY MEDICINE

## 2020-06-13 PROCEDURE — 96366 THER/PROPH/DIAG IV INF ADDON: CPT

## 2020-06-13 PROCEDURE — 96365 THER/PROPH/DIAG IV INF INIT: CPT

## 2020-06-13 PROCEDURE — 99221 1ST HOSP IP/OBS SF/LOW 40: CPT | Performed by: NURSE PRACTITIONER

## 2020-06-13 PROCEDURE — 99223 1ST HOSP IP/OBS HIGH 75: CPT | Performed by: PSYCHIATRY & NEUROLOGY

## 2020-06-13 PROCEDURE — 6370000000 HC RX 637 (ALT 250 FOR IP): Performed by: EMERGENCY MEDICINE

## 2020-06-13 PROCEDURE — 1240000000 HC EMOTIONAL WELLNESS R&B

## 2020-06-13 RX ORDER — OLANZAPINE 10 MG/1
10 INJECTION, POWDER, LYOPHILIZED, FOR SOLUTION INTRAMUSCULAR
Status: ACTIVE | OUTPATIENT
Start: 2020-06-13 | End: 2020-06-13

## 2020-06-13 RX ORDER — LOSARTAN POTASSIUM 25 MG/1
50 TABLET ORAL 2 TIMES DAILY
Status: DISCONTINUED | OUTPATIENT
Start: 2020-06-13 | End: 2020-06-13

## 2020-06-13 RX ORDER — POTASSIUM CHLORIDE 20 MEQ/1
20 TABLET, EXTENDED RELEASE ORAL ONCE
Status: COMPLETED | OUTPATIENT
Start: 2020-06-13 | End: 2020-06-13

## 2020-06-13 RX ORDER — FENOFIBRATE 160 MG/1
160 TABLET ORAL DAILY
Status: DISCONTINUED | OUTPATIENT
Start: 2020-06-13 | End: 2020-06-13

## 2020-06-13 RX ORDER — MAGNESIUM HYDROXIDE/ALUMINUM HYDROXICE/SIMETHICONE 120; 1200; 1200 MG/30ML; MG/30ML; MG/30ML
30 SUSPENSION ORAL EVERY 6 HOURS PRN
Status: DISCONTINUED | OUTPATIENT
Start: 2020-06-13 | End: 2020-06-19 | Stop reason: HOSPADM

## 2020-06-13 RX ORDER — ACETAMINOPHEN 325 MG/1
650 TABLET ORAL EVERY 4 HOURS PRN
Status: DISCONTINUED | OUTPATIENT
Start: 2020-06-13 | End: 2020-06-19 | Stop reason: HOSPADM

## 2020-06-13 RX ORDER — ASPIRIN 81 MG/1
81 TABLET, CHEWABLE ORAL DAILY
Status: DISCONTINUED | OUTPATIENT
Start: 2020-06-13 | End: 2020-06-19 | Stop reason: HOSPADM

## 2020-06-13 RX ORDER — BENZTROPINE MESYLATE 1 MG/ML
2 INJECTION INTRAMUSCULAR; INTRAVENOUS 2 TIMES DAILY PRN
Status: DISCONTINUED | OUTPATIENT
Start: 2020-06-13 | End: 2020-06-19 | Stop reason: HOSPADM

## 2020-06-13 RX ORDER — QUETIAPINE FUMARATE 100 MG/1
TABLET, FILM COATED ORAL
Status: COMPLETED
Start: 2020-06-13 | End: 2020-06-13

## 2020-06-13 RX ORDER — PANTOPRAZOLE SODIUM 40 MG/1
40 TABLET, DELAYED RELEASE ORAL DAILY
Status: DISCONTINUED | OUTPATIENT
Start: 2020-06-13 | End: 2020-06-19 | Stop reason: HOSPADM

## 2020-06-13 RX ORDER — IBUPROFEN 400 MG/1
400 TABLET ORAL EVERY 6 HOURS PRN
Status: DISCONTINUED | OUTPATIENT
Start: 2020-06-13 | End: 2020-06-19 | Stop reason: HOSPADM

## 2020-06-13 RX ORDER — OLANZAPINE 10 MG/1
10 TABLET ORAL
Status: ACTIVE | OUTPATIENT
Start: 2020-06-13 | End: 2020-06-13

## 2020-06-13 RX ORDER — TRAZODONE HYDROCHLORIDE 50 MG/1
50 TABLET ORAL NIGHTLY PRN
Status: DISCONTINUED | OUTPATIENT
Start: 2020-06-13 | End: 2020-06-19 | Stop reason: HOSPADM

## 2020-06-13 RX ORDER — INSULIN GLARGINE 100 [IU]/ML
20 INJECTION, SOLUTION SUBCUTANEOUS 2 TIMES DAILY
Status: DISCONTINUED | OUTPATIENT
Start: 2020-06-13 | End: 2020-06-16

## 2020-06-13 RX ORDER — QUETIAPINE FUMARATE 100 MG/1
100 TABLET, FILM COATED ORAL ONCE
Status: COMPLETED | OUTPATIENT
Start: 2020-06-13 | End: 2020-06-13

## 2020-06-13 RX ADMIN — INSULIN LISPRO 9 UNITS: 100 INJECTION, SOLUTION INTRAVENOUS; SUBCUTANEOUS at 11:49

## 2020-06-13 RX ADMIN — TRAZODONE HYDROCHLORIDE 50 MG: 50 TABLET ORAL at 22:50

## 2020-06-13 RX ADMIN — INSULIN LISPRO 15 UNITS: 100 INJECTION, SOLUTION INTRAVENOUS; SUBCUTANEOUS at 17:08

## 2020-06-13 RX ADMIN — MAGNESIUM GLUCONATE 500 MG ORAL TABLET 400 MG: 500 TABLET ORAL at 20:31

## 2020-06-13 RX ADMIN — POTASSIUM CHLORIDE 40 MEQ: 1500 TABLET, EXTENDED RELEASE ORAL at 00:27

## 2020-06-13 RX ADMIN — INSULIN GLARGINE 20 UNITS: 100 INJECTION, SOLUTION SUBCUTANEOUS at 14:54

## 2020-06-13 RX ADMIN — SODIUM CHLORIDE 1000 ML: 9 INJECTION, SOLUTION INTRAVENOUS at 00:27

## 2020-06-13 RX ADMIN — INSULIN HUMAN 10 UNITS: 100 INJECTION, SOLUTION PARENTERAL at 00:27

## 2020-06-13 RX ADMIN — POTASSIUM CHLORIDE 20 MEQ: 1500 TABLET, EXTENDED RELEASE ORAL at 14:54

## 2020-06-13 RX ADMIN — QUETIAPINE FUMARATE 100 MG: 100 TABLET ORAL at 02:12

## 2020-06-13 RX ADMIN — POTASSIUM CHLORIDE 20 MEQ: 1500 TABLET, EXTENDED RELEASE ORAL at 04:30

## 2020-06-13 RX ADMIN — ASPIRIN 81 MG 81 MG: 81 TABLET ORAL at 14:53

## 2020-06-13 RX ADMIN — INSULIN GLARGINE 20 UNITS: 100 INJECTION, SOLUTION SUBCUTANEOUS at 22:44

## 2020-06-13 RX ADMIN — QUETIAPINE FUMARATE 100 MG: 100 TABLET, FILM COATED ORAL at 02:12

## 2020-06-13 RX ADMIN — PANTOPRAZOLE SODIUM 40 MG: 40 TABLET, DELAYED RELEASE ORAL at 14:53

## 2020-06-13 RX ADMIN — MAGNESIUM GLUCONATE 500 MG ORAL TABLET 400 MG: 500 TABLET ORAL at 14:54

## 2020-06-13 RX ADMIN — MAGNESIUM SULFATE IN WATER 2 G: 40 INJECTION, SOLUTION INTRAVENOUS at 00:27

## 2020-06-13 ASSESSMENT — SLEEP AND FATIGUE QUESTIONNAIRES
AVERAGE NUMBER OF SLEEP HOURS: 5
DIFFICULTY ARISING: NO
DO YOU USE A SLEEP AID: NO
RESTFUL SLEEP: NO
DIFFICULTY FALLING ASLEEP: YES
SLEEP PATTERN: DIFFICULTY FALLING ASLEEP
DIFFICULTY STAYING ASLEEP: NO
DO YOU HAVE DIFFICULTY SLEEPING: YES

## 2020-06-13 ASSESSMENT — LIFESTYLE VARIABLES: HISTORY_ALCOHOL_USE: NO

## 2020-06-13 NOTE — ED NOTES
To [address]. Changed into appropriate gown, and placed into treatment room B3. Belongings placed in locker. Pt calm and cooperative at this time. Will continue to monitor for pt safety.      Vladimir Prieto  06/12/20 6030

## 2020-06-13 NOTE — ED PROVIDER NOTES
encounter.       Current Outpatient Medications   Medication Sig Dispense Refill    buPROPion (WELLBUTRIN XL) 150 MG extended release tablet Take 1 tablet by mouth daily 30 tablet 0    aspirin 81 MG tablet Take 1 tablet by mouth daily 30 tablet 1    insulin glargine (BASAGLAR KWIKPEN) 100 UNIT/ML injection pen Inject 20 Units into the skin 2 times daily 5 pen 0    fenofibrate (TRICOR) 145 MG tablet Take 1 tablet by mouth daily 30 tablet 1    losartan (COZAAR) 50 MG tablet Take 1 tablet by mouth 2 times daily 30 tablet 1    QUEtiapine (SEROQUEL) 50 MG tablet Take 1 tablet by mouth nightly 30 tablet 0    blood glucose test strips (FREESTYLE LITE) strip Patient test three times daily due to fluctuating blood sugars  Diagnosis = 250.00 NIDDM 100 strip 1    Alcohol Swabs PADS 1 each by Does not apply route 4 times daily 150 each 1    FreeStyle Lancets MISC 1 each by Does not apply route 4 times daily 150 each 1    glucose monitoring kit (FREESTYLE) monitoring kit 1 kit by Does not apply route 4 times daily 1 kit 0    Insulin Pen Needle 29G X 12.7MM MISC 1 each by Does not apply route 4 times daily 100 each 1    INSULIN SYRINGE .5CC/29G 29G X 1/2\" 0.5 ML MISC 1 each by Does not apply route daily 100 each 3    nicotine (NICODERM CQ) 21 MG/24HR Place 1 patch onto the skin daily 30 patch 0    pantoprazole (PROTONIX) 40 MG tablet Take 1 tablet by mouth daily 30 tablet 0    raNITIdine (ZANTAC) 150 MG tablet Take 1 tablet by mouth 2 times daily 60 tablet 0    HUMALOG 100 UNIT/ML injection vial INJECT 0-16 UNITS 3 TIMES A DAY BEFORE MEALS  Glucose: Dose:               No Insulin  140-199           3 Units  200-249 6 Units  250-299 9 Units  300-349 12 Units  350-400 15 Units  Over 400  16 Units 1 vial 3    insulin lispro (HUMALOG) 100 UNIT/ML injection vial Inject 0-6 Units into the skin nightly Glucose: Dose:  If <139             No Insulin  140-199           1 Unit  200-249           2 Units  250-299 3 Units  300-349           4 Units  350-400           5 Units  Above 400       6 Units 1 vial 1    ranitidine (ZANTAC) 150 MG tablet Take 1 tablet by mouth 2 times daily 60 tablet 0    blood glucose test strips (FREESTYLE LITE) strip Patient test three times daily due to fluctuating blood sugars  Diagnosis = 250.00 NIDDM 100 strip 1    Acetaminophen 325 MG CAPS Take 650 mg by mouth every 6 hours as needed (pain) 32 capsule 0     Allergies   Allergen Reactions    Mushroom Extract Complex Anaphylaxis    Metformin And Related Other (See Comments)     DR DOES NOT WANT PATIENT TAKING DUE TO STAGE 3 KIDNEY FAILURE    Penicillins     Ultram [Tramadol Hcl]      States he gets HAs     Codeine Rash     Rash         REVIEW OF SYSTEMS  10 systems reviewed, pertinent positives per HPI otherwise noted to be negative. PHYSICAL EXAM  BP (!) 149/80   Pulse 84   Temp 97.4 °F (36.3 °C)   Resp 20   Ht 5' 9\" (1.753 m)   Wt 145 lb (65.8 kg)   SpO2 99%   BMI 21.41 kg/m²   GENERAL APPEARANCE: Awake and alert. Cooperative. No acute distress, appears fatigued  HEAD: Normocephalic. Atraumatic. EYES: PERRL. EOM's grossly intact. ENT: Mucous membranes are tacky. NECK: Supple. No rigidity  HEART: RRR. No murmurs  LUNGS: Respirations nonlabored. Lungs are CTAB. ABDOMEN: Soft. Non-distended. Non-tender. No guarding or rebound. Normal bowel sounds. EXTREMITIES: No peripheral edema. Moves all extremities equally. All extremities neurovascularly intact. SKIN: Warm and dry. No acute rashes. NEUROLOGICAL: Alert and oriented. No gross facial drooping. Strength 5/5, sensation intact. No truncal ataxia. normal speech  PSYCHIATRIC: depressed mood and flat affect. Admits to SI, no HI    LABS  I have reviewed all labs for this visit.    Results for orders placed or performed during the hospital encounter of 06/12/20   Acetaminophen Level   Result Value Ref Range    Acetaminophen Level <5 (L) 10 - 30 ug/mL   CBC Auto Encounter   Medications    0.9 % sodium chloride bolus    insulin regular (HUMULIN R;NOVOLIN R) injection 10 Units    potassium chloride (KLOR-CON M) extended release tablet 40 mEq    magnesium sulfate 2 g in 50 mL IVPB premix    QUEtiapine (SEROQUEL) tablet 100 mg    QUEtiapine (SEROQUEL) 100 MG tablet     Danita Baig: cabinet override    DISCONTD: insulin regular (HUMULIN R;NOVOLIN R) injection 15 Units    potassium chloride (KLOR-CON M) extended release tablet 20 mEq     ED Course as of Jun 13 0438   Sat Jun 13, 2020   0317 Medically cleared after 2nd dose of insulin and potassium medications. [SY]      ED Course User Index  [SY] Viridiana Levine DO       CLINICAL IMPRESSION  1. Suicidal ideation    2. Hypokalemia    3. Poorly controlled diabetes mellitus ([address]    4. Hyperglycemia    5. Hypomagnesemia    6. Episode of recurrent major depressive disorder, unspecified depression episode severity (HCC)        Blood pressure (!) 149/80, pulse 84, temperature 97.4 °F (36.3 °C), resp. rate 20, height 5' 9\" (1.753 m), weight 145 lb (65.8 kg), SpO2 99 %. Vivienne Calero was admitted in stable condition.                   Viridiana Levine DO  06/19/20 4805
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
## 2020-06-13 NOTE — BH NOTE
of this team    Method: Individual    Outcome: Verbalized understanding    PATIENT GOALS: \" talk to doctor. \"     PLAN/TREATMENT RECOMMENDATIONS UPDATE: maintain safety, medication management     GOALS UPDATE:   Time frame for Short-Term Goals:  By time of discharge     Santa Owens RN

## 2020-06-13 NOTE — ED NOTES
Presenting Problem: Suicidal Ideation    Appearance/Hygiene:  hospital attire, lying in bed, fair grooming and poor hygiene   Motor Behavior: WNL   Attitude: cooperative  Affect: depressed affect   Speech: soft  Mood: depressed   Thought Processes: [place]  Perceptions: Absent   Thought content: suicide  Suicidal ideation:  specific plan to harm self: slit wrists with knife   Homicidal ideation:  none  Orientation: A&Ox4   Memory: intact  Concentration: Good    Insight/ judgement: impaired judgment and impaired insight      Psychosocial and contextual factors: patient states that he is homeless and that tonight he just wants to end it all. He states that he fully intends to slit his wrists with his knife. He states that he is homeless and his diabetic issues are just too much for him to take anymore.     C-SSRS Summary (including current and past suicidal ideation, plan, intent, and attempts) : 3 past attempts, hanging, and overdose, current thoughts of wanting to slit wrists    Psychiatric History: Yes    Patient reported diagnosis: Depressed    Outpatient services/ Provider: None    Previous Inpatient Admissions( including location and dates if known): [facility]    Self-injurious/ Self-harm behavior: Denies    History of violence: Denies    Current Substance use: Meth and Marijuana    Trauma identified: watched his Dad die    Access to Firearms: Denies    ASSESSMENT FOR IMMINENT FUTURE DANGER:      RISK FACTORS:    []  Age <25 or >49   [x]  Male gender   [x]  Depressed mood   [x]  Active suicidal ideation   [x]  Suicide plan   []  Suicide attempt   [x]  Access to lethal means   [x]  Prior suicide attempt   [x]  Active substance abuse   [x]  Highly impulsive behaviors   [x]  Not attending to self-care/ADLs    []  Recent significant loss   [x]  Chronic pain or medical illness   [x]  Social isolation   []  History of violence   []  Active psychosis   []  Cognitive impairment    [x]  No outpatient services in place   [x]

## 2020-06-13 NOTE — H&P
DENTAL SURGERY  2001    oral surgey    EYE SURGERY      KIDNEY BIOPSY      KNEE ARTHROSCOPY      cyst removal    OTHER SURGICAL HISTORY Left 11/28/2017    left arm Irrigation and drainage    RETINAL DETACHMENT SURGERY      VASECTOMY         Medications Prior to Admission:    Prior to Admission medications    Medication Sig Start Date End Date Taking?  Authorizing Provider   aspirin 81 MG tablet Take 1 tablet by mouth daily 3/24/20  Yes KARELY Engel   QUEtiapine (SEROQUEL) 50 MG tablet Take 1 tablet by mouth nightly 3/24/20  Yes KARELY Engel   pantoprazole (PROTONIX) 40 MG tablet Take 1 tablet by mouth daily 3/24/20  Yes KARELY Engel   buPROPion (WELLBUTRIN XL) 150 MG extended release tablet Take 1 tablet by mouth daily 3/24/20   KARELY Engel   insulin glargine [facility]) 100 UNIT/ML injection pen Inject 20 Units into the skin 2 times daily 3/24/20   KARELY Engel   fenofibrate (TRICOR) 145 MG tablet Take 1 tablet by mouth daily 3/24/20   KARELY Engel   losartan (COZAAR) 50 MG tablet Take 1 tablet by mouth 2 times daily 3/24/20   KARELY Engel   blood glucose test strips (FREESTYLE LITE) strip Patient test three times daily due to fluctuating blood sugars  Diagnosis = 250.00 NIDDM 3/24/20 3/29/21  KARELY Engel   Alcohol Swabs PADS 1 each by Does not apply route 4 times daily 3/24/20   KARELY Engel   FreeStyle Lancets MISC 1 each by Does not apply route 4 times daily 3/24/20   KARELY Engel   glucose monitoring kit (FREESTYLE) monitoring kit 1 kit by Does not apply route 4 times daily 3/24/20   KARELY Engel   Insulin Pen Needle 29G X 12.7MM MISC 1 each by Does not apply route 4 times daily 3/24/20   KARELY Engel   INSULIN SYRINGE .5CC/29G 29G X 1/2\" 0.5 ML MISC 1 each by Does not apply route daily 3/24/20   KARELY Engel   nicotine (NICODERM CQ) 21 MG/24HR Place 1

## 2020-06-13 NOTE — ED NOTES
ED Charge RN informed of lab levels     Adelaida Guerrero, [address] [place] [address]  06/12/20 6200

## 2020-06-13 NOTE — PLAN OF CARE
Problem: Depressive Behavior With or Without Suicide Precautions:  Goal: Able to verbalize and/or display a decrease in depressive symptoms  Description: Able to verbalize and/or display a decrease in depressive symptoms  Outcome: Ongoing  Note: Has slept most of this shift. Awaken for med administration and meals. Affect is flat. When asked how often he administers his insulin \"not very often\". Denies having self harm thoughts. Contracts for safety.

## 2020-06-13 NOTE — ED NOTES
Regarding collateral pt reports he is in contact with no family or relatives. Pt has no emergency contacts.       Maddyon Conner  06/12/20 2296

## 2020-06-13 NOTE — ED NOTES
Pt calm, resting in treatment room at this time. No distress. Will continue to monitor for pt safety.

## 2020-06-14 LAB
ANION GAP SERPL CALCULATED.3IONS-SCNC: 8 MMOL/L (ref 3–16)
BUN BLDV-MCNC: 17 MG/DL (ref 7–20)
CALCIUM SERPL-MCNC: 8.6 MG/DL (ref 8.3–10.6)
CHLORIDE BLD-SCNC: 102 MMOL/L (ref 99–110)
CHOLESTEROL, TOTAL: 194 MG/DL (ref 0–199)
CO2: 23 MMOL/L (ref 21–32)
CREAT SERPL-MCNC: 0.9 MG/DL (ref 0.9–1.3)
GFR AFRICAN AMERICAN: >60
GFR NON-AFRICAN AMERICAN: >60
GLUCOSE BLD-MCNC: 161 MG/DL (ref 70–99)
GLUCOSE BLD-MCNC: 212 MG/DL (ref 70–99)
GLUCOSE BLD-MCNC: 218 MG/DL (ref 70–99)
GLUCOSE BLD-MCNC: 225 MG/DL (ref 70–99)
GLUCOSE BLD-MCNC: 233 MG/DL (ref 70–99)
HDLC SERPL-MCNC: 32 MG/DL (ref 40–60)
LDL CHOLESTEROL CALCULATED: 113 MG/DL
MAGNESIUM: 1.7 MG/DL (ref 1.8–2.4)
PERFORMED ON: ABNORMAL
POTASSIUM SERPL-SCNC: 4.3 MMOL/L (ref 3.5–5.1)
SODIUM BLD-SCNC: 133 MMOL/L (ref 136–145)
TRIGL SERPL-MCNC: 245 MG/DL (ref 0–150)
VLDLC SERPL CALC-MCNC: 49 MG/DL

## 2020-06-14 PROCEDURE — 83735 ASSAY OF MAGNESIUM: CPT

## 2020-06-14 PROCEDURE — 80061 LIPID PANEL: CPT

## 2020-06-14 PROCEDURE — 80048 BASIC METABOLIC PNL TOTAL CA: CPT

## 2020-06-14 PROCEDURE — 1240000000 HC EMOTIONAL WELLNESS R&B

## 2020-06-14 PROCEDURE — 36415 COLL VENOUS BLD VENIPUNCTURE: CPT

## 2020-06-14 PROCEDURE — 6370000000 HC RX 637 (ALT 250 FOR IP): Performed by: NURSE PRACTITIONER

## 2020-06-14 PROCEDURE — 83036 HEMOGLOBIN GLYCOSYLATED A1C: CPT

## 2020-06-14 PROCEDURE — 6370000000 HC RX 637 (ALT 250 FOR IP): Performed by: PSYCHIATRY & NEUROLOGY

## 2020-06-14 RX ADMIN — PANTOPRAZOLE SODIUM 40 MG: 40 TABLET, DELAYED RELEASE ORAL at 09:27

## 2020-06-14 RX ADMIN — INSULIN LISPRO 3 UNITS: 100 INJECTION, SOLUTION INTRAVENOUS; SUBCUTANEOUS at 21:10

## 2020-06-14 RX ADMIN — ASPIRIN 81 MG 81 MG: 81 TABLET ORAL at 09:27

## 2020-06-14 RX ADMIN — INSULIN GLARGINE 20 UNITS: 100 INJECTION, SOLUTION SUBCUTANEOUS at 21:10

## 2020-06-14 RX ADMIN — INSULIN LISPRO 3 UNITS: 100 INJECTION, SOLUTION INTRAVENOUS; SUBCUTANEOUS at 12:13

## 2020-06-14 RX ADMIN — TRAZODONE HYDROCHLORIDE 50 MG: 50 TABLET ORAL at 21:21

## 2020-06-14 RX ADMIN — INSULIN GLARGINE 20 UNITS: 100 INJECTION, SOLUTION SUBCUTANEOUS at 09:26

## 2020-06-14 RX ADMIN — MAGNESIUM GLUCONATE 500 MG ORAL TABLET 400 MG: 500 TABLET ORAL at 21:11

## 2020-06-14 RX ADMIN — INSULIN LISPRO 6 UNITS: 100 INJECTION, SOLUTION INTRAVENOUS; SUBCUTANEOUS at 09:25

## 2020-06-14 RX ADMIN — MAGNESIUM GLUCONATE 500 MG ORAL TABLET 400 MG: 500 TABLET ORAL at 09:27

## 2020-06-14 RX ADMIN — INSULIN LISPRO 6 UNITS: 100 INJECTION, SOLUTION INTRAVENOUS; SUBCUTANEOUS at 17:07

## 2020-06-14 ASSESSMENT — PAIN SCALES - GENERAL: PAINLEVEL_OUTOF10: 0

## 2020-06-15 PROBLEM — E13.319 RETINOPATHY DUE TO SECONDARY DIABETES (HCC): Status: ACTIVE | Noted: 2020-06-15

## 2020-06-15 PROBLEM — F15.20 AMPHETAMINE USE DISORDER, MODERATE, IN CONTROLLED ENVIRONMENT (HCC): Status: ACTIVE | Noted: 2020-06-13

## 2020-06-15 LAB
ESTIMATED AVERAGE GLUCOSE: 435.5 MG/DL
GLUCOSE BLD-MCNC: 110 MG/DL (ref 70–99)
GLUCOSE BLD-MCNC: 141 MG/DL (ref 70–99)
GLUCOSE BLD-MCNC: 237 MG/DL (ref 70–99)
GLUCOSE BLD-MCNC: 381 MG/DL (ref 70–99)
GLUCOSE BLD-MCNC: 423 MG/DL (ref 70–99)
HBA1C MFR BLD: 16.8 %
PERFORMED ON: ABNORMAL

## 2020-06-15 PROCEDURE — 97535 SELF CARE MNGMENT TRAINING: CPT

## 2020-06-15 PROCEDURE — 6370000000 HC RX 637 (ALT 250 FOR IP): Performed by: PSYCHIATRY & NEUROLOGY

## 2020-06-15 PROCEDURE — 99233 SBSQ HOSP IP/OBS HIGH 50: CPT | Performed by: PSYCHIATRY & NEUROLOGY

## 2020-06-15 PROCEDURE — 97165 OT EVAL LOW COMPLEX 30 MIN: CPT

## 2020-06-15 PROCEDURE — 1240000000 HC EMOTIONAL WELLNESS R&B

## 2020-06-15 PROCEDURE — 6370000000 HC RX 637 (ALT 250 FOR IP): Performed by: NURSE PRACTITIONER

## 2020-06-15 RX ORDER — QUETIAPINE FUMARATE 25 MG/1
50 TABLET, FILM COATED ORAL ONCE
Status: COMPLETED | OUTPATIENT
Start: 2020-06-15 | End: 2020-06-15

## 2020-06-15 RX ORDER — NICOTINE 21 MG/24HR
1 PATCH, TRANSDERMAL 24 HOURS TRANSDERMAL DAILY
Status: DISCONTINUED | OUTPATIENT
Start: 2020-06-15 | End: 2020-06-19 | Stop reason: HOSPADM

## 2020-06-15 RX ORDER — POLYETHYLENE GLYCOL 3350 17 G
2 POWDER IN PACKET (EA) ORAL PRN
Status: DISCONTINUED | OUTPATIENT
Start: 2020-06-15 | End: 2020-06-16

## 2020-06-15 RX ADMIN — INSULIN GLARGINE 20 UNITS: 100 INJECTION, SOLUTION SUBCUTANEOUS at 21:04

## 2020-06-15 RX ADMIN — ASPIRIN 81 MG 81 MG: 81 TABLET ORAL at 09:11

## 2020-06-15 RX ADMIN — INSULIN LISPRO 2 UNITS: 100 INJECTION, SOLUTION INTRAVENOUS; SUBCUTANEOUS at 21:06

## 2020-06-15 RX ADMIN — NICOTINE POLACRILEX 2 MG: 2 LOZENGE ORAL at 19:09

## 2020-06-15 RX ADMIN — MAGNESIUM GLUCONATE 500 MG ORAL TABLET 400 MG: 500 TABLET ORAL at 21:03

## 2020-06-15 RX ADMIN — MAGNESIUM GLUCONATE 500 MG ORAL TABLET 400 MG: 500 TABLET ORAL at 09:11

## 2020-06-15 RX ADMIN — INSULIN LISPRO 15 UNITS: 100 INJECTION, SOLUTION INTRAVENOUS; SUBCUTANEOUS at 09:10

## 2020-06-15 RX ADMIN — NICOTINE POLACRILEX 2 MG: 2 LOZENGE ORAL at 16:29

## 2020-06-15 RX ADMIN — PANTOPRAZOLE SODIUM 40 MG: 40 TABLET, DELAYED RELEASE ORAL at 09:11

## 2020-06-15 RX ADMIN — QUETIAPINE FUMARATE 50 MG: 25 TABLET ORAL at 21:03

## 2020-06-15 RX ADMIN — INSULIN LISPRO 18 UNITS: 100 INJECTION, SOLUTION INTRAVENOUS; SUBCUTANEOUS at 17:44

## 2020-06-15 RX ADMIN — INSULIN GLARGINE 20 UNITS: 100 INJECTION, SOLUTION SUBCUTANEOUS at 09:10

## 2020-06-15 RX ADMIN — SERTRALINE HYDROCHLORIDE 25 MG: 50 TABLET ORAL at 13:31

## 2020-06-15 NOTE — PLAN OF CARE
Problem: Depressive Behavior With or Without Suicide Precautions:  Goal: Able to verbalize acceptance of life and situations over which he or she has no control  Description: Able to verbalize acceptance of life and situations over which he or she has no control  Outcome: Ongoing  Patient visible on unit. Isolated to self and room mostly. Medication compliant. Social with select peers at mealtime. Calm and cooperative during interview. Denies SI/HI/AH/VH. Patient states he has increased anxiety due to not having a place to live. Rates his anxiety as an 8 and his depression as a 8 on 1-10  scale. Problem: Depressive Behavior With or Without Suicide Precautions:  Goal: Absence of self-harm  Description: Absence of self-harm  Outcome: Ongoing   Patient was free from self harm this  shift.

## 2020-06-15 NOTE — BH NOTE
neymar barragan      EDUCATION:   Learner Progress Toward Treatment Goals: Reviewed goals and plan of care    Method: Individual    Outcome: Verbalized understanding    PATIENT GOALS: Did not make goal today    PLAN/TREATMENT RECOMMENDATIONS UPDATE: Continue treatment and medication management.      GOALS UPDATE:   Time frame for Short-Term Goals: 2 days      Delmy Mclaughlin RN

## 2020-06-16 LAB
GLUCOSE BLD-MCNC: 169 MG/DL (ref 70–99)
GLUCOSE BLD-MCNC: 342 MG/DL (ref 70–99)
GLUCOSE BLD-MCNC: 357 MG/DL (ref 70–99)
GLUCOSE BLD-MCNC: 460 MG/DL (ref 70–99)
PERFORMED ON: ABNORMAL

## 2020-06-16 PROCEDURE — 6370000000 HC RX 637 (ALT 250 FOR IP): Performed by: PHYSICIAN ASSISTANT

## 2020-06-16 PROCEDURE — 97535 SELF CARE MNGMENT TRAINING: CPT

## 2020-06-16 PROCEDURE — 1240000000 HC EMOTIONAL WELLNESS R&B

## 2020-06-16 PROCEDURE — 6370000000 HC RX 637 (ALT 250 FOR IP): Performed by: PSYCHIATRY & NEUROLOGY

## 2020-06-16 PROCEDURE — 99233 SBSQ HOSP IP/OBS HIGH 50: CPT | Performed by: PSYCHIATRY & NEUROLOGY

## 2020-06-16 PROCEDURE — 6370000000 HC RX 637 (ALT 250 FOR IP): Performed by: NURSE PRACTITIONER

## 2020-06-16 RX ORDER — NICOTINE POLACRILEX 4 MG
15 LOZENGE BUCCAL PRN
Status: DISCONTINUED | OUTPATIENT
Start: 2020-06-16 | End: 2020-06-19 | Stop reason: HOSPADM

## 2020-06-16 RX ORDER — INSULIN GLARGINE 100 [IU]/ML
23 INJECTION, SOLUTION SUBCUTANEOUS 2 TIMES DAILY
Status: DISCONTINUED | OUTPATIENT
Start: 2020-06-16 | End: 2020-06-19 | Stop reason: HOSPADM

## 2020-06-16 RX ORDER — DEXTROSE MONOHYDRATE 50 MG/ML
100 INJECTION, SOLUTION INTRAVENOUS PRN
Status: DISCONTINUED | OUTPATIENT
Start: 2020-06-16 | End: 2020-06-19 | Stop reason: HOSPADM

## 2020-06-16 RX ORDER — DEXTROSE MONOHYDRATE 25 G/50ML
12.5 INJECTION, SOLUTION INTRAVENOUS PRN
Status: DISCONTINUED | OUTPATIENT
Start: 2020-06-16 | End: 2020-06-19 | Stop reason: HOSPADM

## 2020-06-16 RX ORDER — QUETIAPINE FUMARATE 100 MG/1
100 TABLET, FILM COATED ORAL NIGHTLY
Status: DISCONTINUED | OUTPATIENT
Start: 2020-06-16 | End: 2020-06-19 | Stop reason: HOSPADM

## 2020-06-16 RX ORDER — LOSARTAN POTASSIUM 25 MG/1
50 TABLET ORAL DAILY
Status: DISCONTINUED | OUTPATIENT
Start: 2020-06-16 | End: 2020-06-19 | Stop reason: HOSPADM

## 2020-06-16 RX ADMIN — INSULIN LISPRO 3 UNITS: 100 INJECTION, SOLUTION INTRAVENOUS; SUBCUTANEOUS at 12:19

## 2020-06-16 RX ADMIN — INSULIN GLARGINE 23 UNITS: 100 INJECTION, SOLUTION SUBCUTANEOUS at 23:00

## 2020-06-16 RX ADMIN — PANTOPRAZOLE SODIUM 40 MG: 40 TABLET, DELAYED RELEASE ORAL at 08:58

## 2020-06-16 RX ADMIN — QUETIAPINE FUMARATE 100 MG: 100 TABLET ORAL at 21:06

## 2020-06-16 RX ADMIN — ASPIRIN 81 MG 81 MG: 81 TABLET ORAL at 08:58

## 2020-06-16 RX ADMIN — IBUPROFEN 400 MG: 400 TABLET, FILM COATED ORAL at 21:06

## 2020-06-16 RX ADMIN — INSULIN LISPRO 15 UNITS: 100 INJECTION, SOLUTION INTRAVENOUS; SUBCUTANEOUS at 09:00

## 2020-06-16 RX ADMIN — NICOTINE POLACRILEX 2 MG: 2 LOZENGE ORAL at 08:58

## 2020-06-16 RX ADMIN — LOSARTAN POTASSIUM 50 MG: 25 TABLET, FILM COATED ORAL at 12:22

## 2020-06-16 RX ADMIN — INSULIN GLARGINE 20 UNITS: 100 INJECTION, SOLUTION SUBCUTANEOUS at 09:01

## 2020-06-16 RX ADMIN — INSULIN LISPRO 6 UNITS: 100 INJECTION, SOLUTION INTRAVENOUS; SUBCUTANEOUS at 20:56

## 2020-06-16 RX ADMIN — INSULIN LISPRO 18 UNITS: 100 INJECTION, SOLUTION INTRAVENOUS; SUBCUTANEOUS at 17:14

## 2020-06-16 RX ADMIN — NICOTINE POLACRILEX 4 MG: 4 GUM, CHEWING BUCCAL at 21:17

## 2020-06-16 RX ADMIN — SERTRALINE HYDROCHLORIDE 25 MG: 50 TABLET ORAL at 08:58

## 2020-06-16 ASSESSMENT — PAIN SCALES - GENERAL: PAINLEVEL_OUTOF10: 8

## 2020-06-16 NOTE — GROUP NOTE
Group Therapy Note    Date: 6/16/2020    Group Start Time: 1:00 pm  Group End Time: 2:00 pm  Group Topic: Relapse Prevention    [facility]        Group Therapy Note  Attendees: 8       Patient's Goal: Identify common problems and persistent symptoms that cause distress. Fill out the step by step problem handout, share with the group what their problem is, how they will solve it and give feedback to other group members. Notes:  Pt was appropriate and met goal.    Status After Intervention:  Improved    Participation Level:  Active Listener and Interactive    Participation Quality: Appropriate, Attentive, Sharing and Supportive      Speech:  normal      Thought Process/Content: Logical      Affective Functioning: Congruent      Mood: depressed      Level of consciousness:  Alert and Oriented x4      Response to Learning: Able to verbalize current knowledge/experience, Able to verbalize/acknowledge new learning and Progressing to goal      Endings: None Reported    Modes of Intervention: Education, Support, Socialization, Problem-solving and Activity      Discipline Responsible: /Counselor      Signature:  SOCORRO Boyce

## 2020-06-17 LAB
GLUCOSE BLD-MCNC: 101 MG/DL (ref 70–99)
GLUCOSE BLD-MCNC: 332 MG/DL (ref 70–99)
GLUCOSE BLD-MCNC: 335 MG/DL (ref 70–99)
GLUCOSE BLD-MCNC: 345 MG/DL (ref 70–99)
PERFORMED ON: ABNORMAL

## 2020-06-17 PROCEDURE — 1240000000 HC EMOTIONAL WELLNESS R&B

## 2020-06-17 PROCEDURE — 99233 SBSQ HOSP IP/OBS HIGH 50: CPT | Performed by: PSYCHIATRY & NEUROLOGY

## 2020-06-17 PROCEDURE — 6370000000 HC RX 637 (ALT 250 FOR IP): Performed by: NURSE PRACTITIONER

## 2020-06-17 PROCEDURE — 6370000000 HC RX 637 (ALT 250 FOR IP): Performed by: PSYCHIATRY & NEUROLOGY

## 2020-06-17 PROCEDURE — 6370000000 HC RX 637 (ALT 250 FOR IP): Performed by: PHYSICIAN ASSISTANT

## 2020-06-17 RX ADMIN — INSULIN LISPRO 6 UNITS: 100 INJECTION, SOLUTION INTRAVENOUS; SUBCUTANEOUS at 20:40

## 2020-06-17 RX ADMIN — PANTOPRAZOLE SODIUM 40 MG: 40 TABLET, DELAYED RELEASE ORAL at 09:39

## 2020-06-17 RX ADMIN — LOSARTAN POTASSIUM 50 MG: 25 TABLET, FILM COATED ORAL at 09:39

## 2020-06-17 RX ADMIN — ASPIRIN 81 MG 81 MG: 81 TABLET ORAL at 09:40

## 2020-06-17 RX ADMIN — NICOTINE POLACRILEX 4 MG: 4 GUM, CHEWING BUCCAL at 18:21

## 2020-06-17 RX ADMIN — INSULIN LISPRO 15 UNITS: 100 INJECTION, SOLUTION INTRAVENOUS; SUBCUTANEOUS at 09:41

## 2020-06-17 RX ADMIN — TRAZODONE HYDROCHLORIDE 50 MG: 50 TABLET ORAL at 23:32

## 2020-06-17 RX ADMIN — INSULIN GLARGINE 23 UNITS: 100 INJECTION, SOLUTION SUBCUTANEOUS at 20:47

## 2020-06-17 RX ADMIN — SERTRALINE HYDROCHLORIDE 25 MG: 50 TABLET ORAL at 09:39

## 2020-06-17 RX ADMIN — INSULIN GLARGINE 23 UNITS: 100 INJECTION, SOLUTION SUBCUTANEOUS at 09:42

## 2020-06-17 RX ADMIN — QUETIAPINE FUMARATE 100 MG: 100 TABLET ORAL at 21:36

## 2020-06-17 RX ADMIN — INSULIN LISPRO 12 UNITS: 100 INJECTION, SOLUTION INTRAVENOUS; SUBCUTANEOUS at 17:46

## 2020-06-17 ASSESSMENT — PAIN SCALES - GENERAL
PAINLEVEL_OUTOF10: 0

## 2020-06-18 VITALS
OXYGEN SATURATION: 98 % | WEIGHT: 145 LBS | SYSTOLIC BLOOD PRESSURE: 152 MMHG | TEMPERATURE: 98.6 F | DIASTOLIC BLOOD PRESSURE: 67 MMHG | BODY MASS INDEX: 21.48 KG/M2 | HEART RATE: 86 BPM | HEIGHT: 69 IN | RESPIRATION RATE: 16 BRPM

## 2020-06-18 LAB
GLUCOSE BLD-MCNC: 168 MG/DL (ref 70–99)
GLUCOSE BLD-MCNC: 288 MG/DL (ref 70–99)
GLUCOSE BLD-MCNC: 421 MG/DL (ref 70–99)
GLUCOSE BLD-MCNC: 483 MG/DL (ref 70–99)
PERFORMED ON: ABNORMAL

## 2020-06-18 PROCEDURE — 6370000000 HC RX 637 (ALT 250 FOR IP): Performed by: NURSE PRACTITIONER

## 2020-06-18 PROCEDURE — 99233 SBSQ HOSP IP/OBS HIGH 50: CPT | Performed by: PSYCHIATRY & NEUROLOGY

## 2020-06-18 PROCEDURE — 1240000000 HC EMOTIONAL WELLNESS R&B

## 2020-06-18 PROCEDURE — 6370000000 HC RX 637 (ALT 250 FOR IP): Performed by: PSYCHIATRY & NEUROLOGY

## 2020-06-18 PROCEDURE — 6370000000 HC RX 637 (ALT 250 FOR IP): Performed by: PHYSICIAN ASSISTANT

## 2020-06-18 RX ADMIN — LOSARTAN POTASSIUM 50 MG: 25 TABLET, FILM COATED ORAL at 08:51

## 2020-06-18 RX ADMIN — IBUPROFEN 400 MG: 400 TABLET, FILM COATED ORAL at 00:14

## 2020-06-18 RX ADMIN — SERTRALINE HYDROCHLORIDE 50 MG: 50 TABLET ORAL at 08:51

## 2020-06-18 RX ADMIN — ASPIRIN 81 MG 81 MG: 81 TABLET ORAL at 08:51

## 2020-06-18 RX ADMIN — INSULIN GLARGINE 23 UNITS: 100 INJECTION, SOLUTION SUBCUTANEOUS at 08:52

## 2020-06-18 RX ADMIN — INSULIN GLARGINE 23 UNITS: 100 INJECTION, SOLUTION SUBCUTANEOUS at 21:18

## 2020-06-18 RX ADMIN — QUETIAPINE FUMARATE 100 MG: 100 TABLET ORAL at 21:16

## 2020-06-18 RX ADMIN — INSULIN LISPRO 3 UNITS: 100 INJECTION, SOLUTION INTRAVENOUS; SUBCUTANEOUS at 13:04

## 2020-06-18 RX ADMIN — INSULIN LISPRO 9 UNITS: 100 INJECTION, SOLUTION INTRAVENOUS; SUBCUTANEOUS at 08:55

## 2020-06-18 RX ADMIN — INSULIN LISPRO 9 UNITS: 100 INJECTION, SOLUTION INTRAVENOUS; SUBCUTANEOUS at 21:17

## 2020-06-18 RX ADMIN — PANTOPRAZOLE SODIUM 40 MG: 40 TABLET, DELAYED RELEASE ORAL at 08:51

## 2020-06-18 RX ADMIN — INSULIN LISPRO 18 UNITS: 100 INJECTION, SOLUTION INTRAVENOUS; SUBCUTANEOUS at 18:00

## 2020-06-18 ASSESSMENT — PAIN - FUNCTIONAL ASSESSMENT: PAIN_FUNCTIONAL_ASSESSMENT: ACTIVITIES ARE NOT PREVENTED

## 2020-06-18 ASSESSMENT — PAIN DESCRIPTION - PROGRESSION
CLINICAL_PROGRESSION: GRADUALLY WORSENING

## 2020-06-18 ASSESSMENT — PAIN DESCRIPTION - PAIN TYPE: TYPE: CHRONIC PAIN

## 2020-06-18 ASSESSMENT — PAIN DESCRIPTION - ORIENTATION: ORIENTATION: RIGHT

## 2020-06-18 ASSESSMENT — PAIN DESCRIPTION - ONSET: ONSET: GRADUAL

## 2020-06-18 ASSESSMENT — PAIN DESCRIPTION - FREQUENCY: FREQUENCY: INTERMITTENT

## 2020-06-18 ASSESSMENT — PAIN SCALES - GENERAL
PAINLEVEL_OUTOF10: 0
PAINLEVEL_OUTOF10: 7

## 2020-06-18 ASSESSMENT — PAIN DESCRIPTION - LOCATION: LOCATION: LEG

## 2020-06-18 ASSESSMENT — PAIN DESCRIPTION - DESCRIPTORS: DESCRIPTORS: ACHING

## 2020-06-18 NOTE — PROGRESS NOTES
Department of Psychiatry  Progress Note    Patient's chart was reviewed. Discussed with treatment team. Met with patient. SUBJECTIVE:   Overall continues to make gains in mood however, remains pessimistic about his chances for success. 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 Has made contact with sober living facilities.      ROS:   Patient has new complaints: no  Sleeping adequately:  Improved  Appetite adequate: Yes  Engaged in programming: some    OBJECTIVE:  VITALS:  BP (!) 140/92   Pulse 80   Temp 98 °F (36.7 °C) (Oral)   Resp 16   Ht 5' 9\" (1.753 m)   Wt 145 lb (65.8 kg)   SpO2 99%   BMI 21.41 kg/m²     Mental Status Examination:    Appearance: fair grooming and hygiene  Behavior/Attitude toward examiner:  Improved eye contact; visually impaired  Speech: non-pressured  Mood:  \"better\"  Affect:  blunted     Thought processes:  Goal directed, linear, no ELISHA or gross disorganization  Thought Content: no SI, no HI, no delusions voiced, no obsessions  Perceptions: no AVH  Attention: attention span and concentration were intact to interview   Abstraction: intact  Cognition:  Alert and oriented to person, place, time, and situation, recall intact  Insight: improved  Judgment: improved    Medication:  Scheduled:   sertraline  50 mg Oral Daily    insulin glargine  23 Units Subcutaneous BID    losartan  50 mg Oral Daily    QUEtiapine  100 mg Oral Nightly    nicotine  1 patch Transdermal Daily    insulin lispro  0-18 Units Subcutaneous TID WC    insulin lispro  0-9 Units Subcutaneous Nightly    aspirin  81 mg Oral Daily    pantoprazole  40 mg Oral Daily        PRN:  glucose, dextrose, glucagon (rDNA), dextrose, nicotine polacrilex, acetaminophen, ibuprofen, traZODone, benztropine mesylate, magnesium hydroxide, aluminum & magnesium hydroxide-simethicone     ASSESSMENT AND PLAN:  The patient is a
Group Therapy Note    Date: 6/16/2020  Start Time: 2015  End Time:  2045  Number of Participants: 8    Type of Group: Wrap-Up & relaxation    Patient's Goal:  To learn new ways to channel my anxiety    Notes:  \"I failed miserably\", stayed isolated in his room most of the day    Status After Intervention:  Unchanged    Participation Level: Interactive    Participation Quality: Appropriate      Speech:  normal      Thought Process/Content: Logical      Affective Functioning: Congruent      Mood: depressed      Level of consciousness:  Alert      Response to Learning: Progressing to goal      Endings: None Reported    Modes of Intervention: Support, socialization      Discipline Responsible: Licensed Practical Nurse      Signature:  Maria Guadalupe Valle LPN
[place] to Place, [place] to [employer])  Attention:Normal: Yes  Thought Content:Normal: Yes  Hallucinations: None  Delusions: No  Memory:Normal: (ALFRED)  Insight and Judgment: No  Insight and Judgment: Poor Judgment, Poor Insight  Present Suicidal Ideation: Yes(will contract)  Present Homicidal Ideation: No    Tobacco Screening:  Practical Counseling, on admission, duncan X, if applicable and completed (first 3 are required if patient doesn't refuse):             (x )  Recognizing danger situations (included triggers and roadblocks)                    (x )  Coping skills (new ways to manage stress, exercise, relaxation techniques, changing routine, distraction)                                                           ( )  Basic information about quitting (benefits of quitting, techniques in how to quit, available resources  ( ) Referral for counseling faxed to [facility]                                           (x ) Patient refused counseling  ( ) Patient has not smoked in the last 30 days    Metabolic Screening:    Lab Results   Component Value Date    LABA1C 17.1 03/21/2020       Lab Results   Component Value Date    CHOL 133 03/22/2020    CHOL 274 (H) 09/13/2013    CHOL 168 04/02/2013    CHOL 185 01/31/2013    CHOL 205 (H) 11/05/2012    CHOL 192 08/06/2012    CHOL 232 02/13/2012    CHOL 159 11/04/2011    CHOL 208 (H) 08/03/2011     Lab Results   Component Value Date    TRIG 158 (H) 03/22/2020    TRIG 253 (H) 09/13/2013    TRIG 147 04/02/2013    TRIG 149 01/31/2013    TRIG 262 (H) 11/05/2012    TRIG 515 (H) 08/06/2012    TRIG 267 02/13/2012    TRIG 157 (H) 11/04/2011    TRIG 154 (H) 08/03/2011     Lab Results   Component Value Date    HDL 31 (L) 03/22/2020    HDL 47 09/13/2013    HDL 35 (L) 04/02/2013    HDL 36 (L) 01/31/2013    HDL 38 (L) 11/05/2012    HDL 33 (L) 08/06/2012    HDL 31 02/13/2012    HDL 39 (L) 11/04/2011    HDL 34 (L) 08/03/2011     No components found for: [facility]  Lab Results
transportation. Social Network:  None; \"People I've been around just use me for my check. \"  Stressors:  1. Worrying about a place to live. 2.  Health. 3.  Being a lone. Coping Skills:  Music, smoking pot, cigarettes     Pain  [x]Yes  []No  Ratin:10  Location:  Back  Pain Medicine Status: [] Denies need  [] Pain med requested  [x] RN notified. Cognition    A&Ox4, patient appropriate and cooperative. Follows []1 step and [x] 3 step commands. Pt. Presents with impaired insight and judgment. Upper Extremity ROM:    WFL, pt able to perform all bed mobility, transfers, and gait without ROM limitation. Upper Extremity Strength:    WFL, pt able to perform all bed mobility, transfers, and gait without strength limitation. Upper Extremity Sensation:    B hands/feet    Upper Extremity Proprioception:    No apparent deficits. Skin Integrity:  R ring finger cut during a fall prior to admission. Reported this to pt's nurse. Coordination and Tone:  WFL    Balance  Static Sitting:  [x] Good [] Fair [] Poor  Dynamic Sitting:  [x] Good [] Fair [] Poor  Static Standing: [x] Good [] Fair [] Poor  Dynamic Standing: [] Good [x] Fair + [] Poor    Bed mobility:  Independent  Supine to sit:  Sit to supine:  Scooting to head of bed:  Scooting in sitting:  Rolling:  Bridging:    Transfers:  Independent  Sit to stand:  Stand to sit:  Bed/Chair to/from toilet:    Self Care: Independent  Toileting:  Grooming:  Dressing:    Exercise / Activities Initiated:   Pt. Educated on role of OT. Pt. Participated in:  Eval, ADL retraining, medication management and safety. Assessment of Deficits:   Pt demonstrated decreased activity tolerance, decreased safety awareness, decreased vision, decreased cognition, decreased balance, and decreased ADL/IADL status. Pt. Limited during evaluation by decreased cognition. At end of evaluation, pt. In room. Goal(s) : To be met in 3 Visits:  1). Pt. To complete ACLS.   2).

## 2020-06-19 LAB
GLUCOSE BLD-MCNC: 342 MG/DL (ref 70–99)
GLUCOSE BLD-MCNC: 354 MG/DL (ref 70–99)
PERFORMED ON: ABNORMAL
PERFORMED ON: ABNORMAL

## 2020-06-19 PROCEDURE — 6370000000 HC RX 637 (ALT 250 FOR IP): Performed by: PSYCHIATRY & NEUROLOGY

## 2020-06-19 PROCEDURE — 6370000000 HC RX 637 (ALT 250 FOR IP): Performed by: NURSE PRACTITIONER

## 2020-06-19 PROCEDURE — 6370000000 HC RX 637 (ALT 250 FOR IP): Performed by: PHYSICIAN ASSISTANT

## 2020-06-19 PROCEDURE — 99239 HOSP IP/OBS DSCHRG MGMT >30: CPT | Performed by: PSYCHIATRY & NEUROLOGY

## 2020-06-19 RX ORDER — FENOFIBRATE 145 MG/1
145 TABLET, COATED ORAL DAILY
Qty: 30 TABLET | Refills: 0 | Status: SHIPPED | OUTPATIENT
Start: 2020-06-19

## 2020-06-19 RX ORDER — LANCETS 28 GAUGE
1 EACH MISCELLANEOUS 4 TIMES DAILY
Qty: 150 EACH | Refills: 1 | Status: SHIPPED | OUTPATIENT
Start: 2020-06-19

## 2020-06-19 RX ORDER — ASPIRIN 81 MG/1
81 TABLET, CHEWABLE ORAL DAILY
Qty: 30 TABLET | Refills: 0 | Status: CANCELLED | OUTPATIENT
Start: 2020-06-20

## 2020-06-19 RX ORDER — IBUPROFEN 200 MG
1 TABLET ORAL DAILY
Qty: 100 EACH | Refills: 0 | Status: SHIPPED | OUTPATIENT
Start: 2020-06-19

## 2020-06-19 RX ORDER — INSULIN GLARGINE 100 [IU]/ML
25 INJECTION, SOLUTION SUBCUTANEOUS 2 TIMES DAILY
Qty: 5 PEN | Refills: 0 | Status: SHIPPED | OUTPATIENT
Start: 2020-06-19

## 2020-06-19 RX ORDER — QUETIAPINE FUMARATE 100 MG/1
100 TABLET, FILM COATED ORAL NIGHTLY
Qty: 30 TABLET | Refills: 0 | Status: SHIPPED | OUTPATIENT
Start: 2020-06-19 | End: 2020-07-19

## 2020-06-19 RX ORDER — PANTOPRAZOLE SODIUM 40 MG/1
40 TABLET, DELAYED RELEASE ORAL DAILY
Qty: 30 TABLET | Refills: 0 | Status: SHIPPED | OUTPATIENT
Start: 2020-06-19

## 2020-06-19 RX ORDER — PANTOPRAZOLE SODIUM 40 MG/1
40 TABLET, DELAYED RELEASE ORAL DAILY
Qty: 30 TABLET | Refills: 0 | Status: CANCELLED | OUTPATIENT
Start: 2020-06-20 | End: 2020-07-20

## 2020-06-19 RX ORDER — LOSARTAN POTASSIUM 50 MG/1
50 TABLET ORAL DAILY
Qty: 30 TABLET | Refills: 0 | Status: CANCELLED | OUTPATIENT
Start: 2020-06-20 | End: 2020-07-20

## 2020-06-19 RX ORDER — BLOOD-GLUCOSE METER
KIT MISCELLANEOUS
Qty: 100 STRIP | Refills: 1 | Status: SHIPPED | OUTPATIENT
Start: 2020-06-19 | End: 2021-06-24

## 2020-06-19 RX ORDER — INSULIN LISPRO 100 [IU]/ML
INJECTION, SOLUTION INTRAVENOUS; SUBCUTANEOUS
Qty: 5 PEN | Refills: 0 | Status: SHIPPED | OUTPATIENT
Start: 2020-06-19

## 2020-06-19 RX ORDER — BLOOD PRESSURE TEST KIT
1 KIT MISCELLANEOUS 4 TIMES DAILY
Qty: 150 EACH | Refills: 1 | Status: SHIPPED | OUTPATIENT
Start: 2020-06-19

## 2020-06-19 RX ORDER — BLOOD-GLUCOSE METER
1 KIT MISCELLANEOUS 4 TIMES DAILY
Qty: 1 KIT | Refills: 0 | Status: SHIPPED | OUTPATIENT
Start: 2020-06-19

## 2020-06-19 RX ORDER — LOSARTAN POTASSIUM 50 MG/1
50 TABLET ORAL DAILY
Qty: 30 TABLET | Refills: 0 | Status: SHIPPED | OUTPATIENT
Start: 2020-06-20

## 2020-06-19 RX ADMIN — INSULIN GLARGINE 23 UNITS: 100 INJECTION, SOLUTION SUBCUTANEOUS at 09:37

## 2020-06-19 RX ADMIN — ASPIRIN 81 MG 81 MG: 81 TABLET ORAL at 09:35

## 2020-06-19 RX ADMIN — INSULIN LISPRO 12 UNITS: 100 INJECTION, SOLUTION INTRAVENOUS; SUBCUTANEOUS at 09:48

## 2020-06-19 RX ADMIN — LOSARTAN POTASSIUM 50 MG: 25 TABLET, FILM COATED ORAL at 09:47

## 2020-06-19 RX ADMIN — PANTOPRAZOLE SODIUM 40 MG: 40 TABLET, DELAYED RELEASE ORAL at 09:41

## 2020-06-19 RX ADMIN — SERTRALINE HYDROCHLORIDE 50 MG: 50 TABLET ORAL at 09:35

## 2020-06-19 RX ADMIN — INSULIN LISPRO 15 UNITS: 100 INJECTION, SOLUTION INTRAVENOUS; SUBCUTANEOUS at 12:55

## 2020-06-19 NOTE — PLAN OF CARE
Problem: Suicide risk  Goal: Provide patient with safe environment  Outcome: Ongoing     Problem: Depressive Behavior With or Without Suicide Precautions:  Goal: Able to verbalize acceptance of life and situations over which he or she has no control  6/19/2020 1106 by Saskia Kitchen RN  Outcome: Ongoing     Problem: Depressive Behavior With or Without Suicide Precautions:  Goal: Able to verbalize and/or display a decrease in depressive symptoms  Outcome: Ongoing     Problem: Depressive Behavior With or Without Suicide Precautions:  Goal: Ability to disclose and discuss suicidal ideas will improve  Outcome: Ongoing     Problem: Depressive Behavior With or Without Suicide Precautions:  Goal: Able to verbalize support systems  Outcome: Ongoing     Problem: Depressive Behavior With or Without Suicide Precautions:  Goal: Absence of self-harm  Outcome: Ongoing     Problem: Depressive Behavior With or Without Suicide Precautions:  Goal: Patient specific goal  Outcome: Ongoing     Problem: Depressive Behavior With or Without Suicide Precautions:  Goal: Participates in care planning  Outcome: Ongoing     Problem: Falls - Risk of:  Goal: Will remain free from falls  Outcome: Ongoing     Problem: Falls - Risk of:  Goal: Absence of physical injury  Outcome: Ongoing

## 2020-06-19 NOTE — BH NOTE
[facility]  Discharge Note    Pt discharged with followings belongings:   Dentures: Uppers  Vision - Corrective Lenses: None  Hearing Aid: None  Jewelry: None  Body Piercings Removed: N/A  Clothing: Footwear, Jacket / coat, Pants, Shirt, Socks, Undergarments (Comment)(belts, cap)  Were All Patient Medications Collected?: Yes  Other Valuables: Cell phone, [employer], Other (Comment)(cell phone, wallet with $5 cash to safe, lighters, toiletries, ear buds, chargers)   Valuables sent home with pt. Valuables retrieved from safe and returned to patient. Patient education on aftercare instructions: yes  Information faxed by Navdeep La RN. Patient verbalize understanding of AVS:  yes.     Status EXAM upon discharge:  Status and Exam  Normal: Yes  Facial Expression: Brightened  Affect: Appropriate  Level of Consciousness: Alert  Mood:Normal: Yes  Mood: Anxious  Motor Activity:Normal: Yes  Motor Activity: Increased  Interview Behavior: Cooperative  Preception: [place] to Person, Janiya Sarks to Time, [place] to Place, [place] to Situation  Attention:Normal: Yes  Attention: (wnl)  Thought Processes: (logical, linear)  Thought Content:Normal: Yes  Thought Content: (linear)  Hallucinations: None  Delusions: No  Memory:Normal: Yes  Memory: (wnl)  Insight and Judgment: Yes  Insight and Judgment: Poor Judgment, Poor Insight  Present Suicidal Ideation: No  Present Homicidal Ideation: No      Metabolic Screening:    Lab Results   Component Value Date    LABA1C 16.8 06/14/2020       Lab Results   Component Value Date    CHOL 194 06/14/2020    CHOL 133 03/22/2020    CHOL 274 (H) 09/13/2013    CHOL 168 04/02/2013    CHOL 185 01/31/2013    CHOL 205 (H) 11/05/2012    CHOL 192 08/06/2012    CHOL 232 02/13/2012    CHOL 159 11/04/2011    CHOL 208 (H) 08/03/2011     Lab Results   Component Value Date    TRIG 245 (H) 06/14/2020    TRIG 158 (H) 03/22/2020    TRIG 253 (H) 09/13/2013    TRIG 147 04/02/2013    TRIG 149 01/31/2013    TRIG 262 (H) 11/05/2012    TRIG 515 (H) 08/06/2012    TRIG 267 02/13/2012    TRIG 157 (H) 11/04/2011    TRIG 154 (H) 08/03/2011     Lab Results   Component Value Date    HDL 32 (L) 06/14/2020    HDL 31 (L) 03/22/2020    HDL 47 09/13/2013    HDL 35 (L) 04/02/2013    HDL 36 (L) 01/31/2013    HDL 38 (L) 11/05/2012    HDL 33 (L) 08/06/2012    HDL 31 02/13/2012    HDL 39 (L) 11/04/2011    HDL 34 (L) 08/03/2011     No components found for: [facility]  Lab Results   Component Value Date    LABVLDL 49 06/14/2020    LABVLDL 32 03/22/2020    LABVLDL 51 09/13/2013    LABVLDL 29 04/02/2013    LABVLDL 30 01/31/2013    LABVLDL 52 11/05/2012    LABVLDL 31 11/04/2011    LABVLDL 31 08/03/2011       Elissa Cole RN

## 2020-06-19 NOTE — FLOWSHEET NOTE
06/19/20 1022   Status and Exam   Normal Yes   Facial Expression Brightened   Affect Appropriate   Level of Consciousness Alert   Mood:Normal Yes   Motor Activity:Normal Yes   Interview Behavior Cooperative   Preception [place] to Person;[place] to Time;[place] to Place;[place] to Situation   Attention:Normal Yes   Thought Processes   (logical, linear)   Thought Content:Normal Yes   Hallucinations None   Delusions No   Memory:Normal Yes   Insight and Judgment Yes   Present Suicidal Ideation No   Present Homicidal Ideation No

## 2020-06-19 NOTE — GROUP NOTE
Group Therapy Note    Date: 6/18/2020    Group Start Time: 2035  Group End Time: 2055  Group Topic: Wrap-Up    [facility]        Group Therapy Note    Attendees: Goals and importance of goal setting discussed. Night time milieu activities discussed.          Patient's Goal:  Find a place to go    Notes:  Successful     Status After Intervention:  Improved    Participation Level: Interactive    Participation Quality: Appropriate      Speech:  normal      Thought Process/Content: Logical      Affective Functioning: Congruent      Mood: euthymic      Level of consciousness:  Alert and Oriented x4      Response to Learning: Progressing to goal      Endings: None Reported    Modes of Intervention: Support      Discipline Responsible: Yunait      Signature:  Ángel See

## 2020-06-19 NOTE — PLAN OF CARE
Radha Ramirez has been visible this shift. He is hopeful for discharge tomorrow and is looking forward to going to going to his sober living program.  During interview he reflected mostly on the things he needs to change in his life to be successful and healthy for his future. He denies any suicidal or homicidal ideations. He denies auditory hallucinations.

## 2020-06-19 NOTE — GROUP NOTE
Group Therapy Note    Date: 6/19/2020    Group Start Time: 1000  Group End Time: 2054  Group Topic: [place]        Group Therapy Note    Attendees: 11    Patient's Goal: to actively participate in group discussion regarding emotion identification. Pts were encouraged to practice emotion identification by listening to music utilized, identifying their emotional reaction, and communicating those emotions with peers to increase emotion identification, improve communication skills, and improve overall mood. Notes:  Talia Ralph was late to group. Pt appeared to actively listen to music utilized. Pt did not participate in discussions. Pt left group early and did not return. Status After Intervention:  Unchanged    Participation Level:  Active Listener    Participation Quality: Appropriate and Attentive      Speech:  normal      Thought Process/Content: Linear      Affective Functioning: Constricted/Restricted      Mood: depressed      Level of consciousness:  Alert, Oriented x4 and Attentive      Response to Learning: Able to verbalize current knowledge/experience and Progressing to goal      Endings: None Reported    Modes of Intervention: Education, Support, Socialization, Clarifying, Problem-solving, Activity and Media      Discipline Responsible: Psychoeducational Specialist      Signature:  LANDRY Gamez

## 2020-07-07 NOTE — DISCHARGE SUMMARY
hypertension.     FAMILY PSYCHIATRIC HISTORY:  None reported.     CURRENT MEDICATIONS:  Apparently, he had been on Wellbutrin  mg  daily, aspirin 81 mg daily, TriCor 145 mg daily, Cozaar 50 mg twice a  day, Seroquel 50 mg at night, insulin, Humalog sliding scale, Zantac 150  mg twice a day.     ALLERGIES TO MEDICATIONS:  METFORMIN, PENICILLIN, ULTRAM, CODEINE.     REVIEW OF SYSTEMS:  Pertinent positives in the HPI, otherwise negative.     PHYSICAL EXAMINATION ON ADMISSION:  Per CATRINA Ricketts, 06/13/2020. VITAL SIGNS:  Temperature 98.3, pulse 80, respirations 14, blood  pressure 113/60. He is 145 pounds.     LABORATORY DATA:  Urine drug screen was positive for amphetamine. No  alcohol.     ACCESS TO FIREARMS:  Denied.     SUBSTANCE USE:  Denies alcohol. He uses meth and marijuana, but  quantity was unable to be obtained.     LEGAL ISSUES:  None known.     TRAUMA HISTORY:  Denied.     MENTAL STATUS EXAMINATION ON ADMISSION:  The patient is a 29-year-old male, who was uncooperative. He did not appear interested in the interview process. He denied any auditory hallucinations or visual hallucinations. He  states he is continuing to feel suicidal, but not homicidal.  Insight  and judgment, impaired. Unable to assess for orientation, fund of  knowledge, concentration, memory. He did not appear to have any  abnormalities of movement. His affect appeared constricted. Did not  answer questions regarding mood. Hospital Course:   1. Inpatient admission for stabilization.     2. On admission, started zoloft for treatment of depression. Ordered q15min checks for safety, programming, and prn medication for anxiety, agitation, and insomnia.      6/16/2020 - added scheduled Quetiapine 100mg QHS for additional mood stabilization.     6/17/2020 - increased Zoloft to 50mg Daily. Mr. Brett Contreras responded well to treatment including medication, programming, and the structured milieu.   His mood stabilized, imminent risk of danger to themselves and/or others. At the time of discharge Allan Patrick  had received the maximum medical benefit from this hospitalization and could be appropriately managed with outpatient treatment. Medication List      START taking these medications    glucagon (rDNA) 1 MG injection  Inject 1 mg into the muscle as needed for Low blood sugar (Blood glucose less than 70 mg/dL and patient NOT ALERT or NPO and does not have IV access.)     sertraline 50 MG tablet  Commonly known as:  ZOLOFT  Take 1 tablet by mouth daily        CHANGE how you take these medications    * insulin lispro (1 Unit Dial) 100 UNIT/ML Sopn  Commonly known as:  HumaLOG KwikPen  INJECT 0-18 UNITS 3 TIMES A DAY BEFORE MEALS  **High Dose Corrective Algorithm**  Glucose: Dose:               No Insulin  140-199           3 Units  200-249 6 Units  250-299 9 Units  300-349 12 Units  350-400 15 Units  Over 400  18 Units  What changed: You were already taking a medication with the same name, and this prescription was added. Make sure you understand how and when to take each. Replaces:  HumaLOG 100 UNIT/ML injection vial     * insulin lispro 100 UNIT/ML injection vial  Commonly known as:  HUMALOG  Inject 0-18 Units into the skin 3 times daily (with meals)  What changed:    · how much to take  · when to take this  · additional instructions  · Another medication with the same name was removed. Continue taking this medication, and follow the directions you see here.      Lantus SoloStar 100 UNIT/ML injection pen  Generic drug:  insulin glargine  Inject 25 Units into the skin 2 times daily  What changed:  how much to take     losartan 50 MG tablet  Commonly known as:  COZAAR  Take 1 tablet by mouth daily  What changed:  when to take this     QUEtiapine 100 MG tablet  Commonly known as:  SEROQUEL  Take 1 tablet by mouth nightly  What changed:    · medication strength  · how much to take         * This list has 2 vial  · Insulin Pen Needle 29G X 12.7MM Misc  · INSULIN SYRINGE .5CC/29G 29G X 1/2\" 0.5 ML Misc  · Lantus SoloStar 100 UNIT/ML injection pen  · losartan 50 MG tablet  · pantoprazole 40 MG tablet  · QUEtiapine 100 MG tablet  · sertraline 50 MG tablet     You can get these medications from any pharmacy    Bring a paper prescription for each of these medications  · FREESTYLE LITE strip  · glucagon (rDNA) 1 MG injection  · insulin lispro (1 Unit Dial) 100 UNIT/ML Sopn         Follow-up Plan:    Name of Provider: [facility]   Provider specialty/license: LICDC/LISW   Date and time of appointment: Today 6/19/2020 upon discharge   The type/s of services requested are: sober living and IOP  Agency name: [facility]  Address: [address]?, [place], [address] [place] [address]  Phone Number: [phone]    More than 30 minutes were spent on day-of-discharge assessment and planning with the patient.

## 2022-04-22 ENCOUNTER — NURSE TRIAGE (OUTPATIENT)
Dept: OTHER | Facility: CLINIC | Age: 52
End: 2022-04-22

## 2022-04-22 NOTE — TELEPHONE ENCOUNTER
Received call from [facility] [employer] [facility]. Subjective: Caller states \"I have a history of HTN and my doc had me on meds. I took the last of one today and have been out of the other for a while. I moved back to help my uncle but it is too much. I take buspar but it's not working. I also have hydroxizine. \"    Patient does not admit to SI, but did state \"I mean if I don't wake up that would be ok. \"    He has buspar rx'd tid but has taken at least 5 today. He has also taken 5 or 6 hydroxizine. BP this a.m. was 204/111    Current Symptoms: HTN, anxiety, depression    Onset: 2-3 weeks ago    Associated Symptoms: crying, chest pains, headaches    Pain Severity: 6-7/10    Temperature: denies fever    What has been tried: meds    LMP: NA Pregnant: NA    Recommended disposition: Call  Now    Care advice provided, patient verbalizes understanding; denies any other questions or concerns; instructed to call back for any new or worsening symptoms. Patient/caller agrees to calling 911     Attention Provider: Thank you for allowing me to participate in the care of your patient. The patient was connected to triage in response to information provided to the ECC/PSC. Please do not respond through this encounter as the response is not directed to a shared pool.     Reason for Disposition   Patient sounds very upset or troubled to the triager    Protocols used: ANXIETY AND PANIC ATTACK-ADULT-[place]

## 2023-01-12 NOTE — PLAN OF CARE
Pt A&O, pt remains in bed getting for breakfast and then returning to bed. Pt denies SI,HI, and AVH, no distress noted, will continue to monitor. Pt eats 100% of breakfast complies with medication, and complies with care. Performed

## 2023-12-03 ENCOUNTER — APPOINTMENT (OUTPATIENT)
Dept: GENERAL RADIOLOGY | Age: 53
End: 2023-12-03
Payer: MEDICAID

## 2023-12-03 ENCOUNTER — HOSPITAL ENCOUNTER (EMERGENCY)
Age: 53
Discharge: HOME | End: 2023-12-03
Attending: STUDENT IN AN ORGANIZED HEALTH CARE EDUCATION/TRAINING PROGRAM
Payer: MEDICAID

## 2023-12-03 VITALS
OXYGEN SATURATION: 93 % | TEMPERATURE: 97.4 F | SYSTOLIC BLOOD PRESSURE: 182 MMHG | BODY MASS INDEX: 20.76 KG/M2 | RESPIRATION RATE: 17 BRPM | HEIGHT: 70 IN | DIASTOLIC BLOOD PRESSURE: 59 MMHG | HEART RATE: 88 BPM | WEIGHT: 145 LBS

## 2023-12-03 DIAGNOSIS — R06.02 SHORTNESS OF BREATH: Primary | ICD-10-CM

## 2023-12-03 DIAGNOSIS — N18.6 ESRD ON DIALYSIS (HCC): ICD-10-CM

## 2023-12-03 DIAGNOSIS — Z53.21 ELOPED FROM EMERGENCY DEPARTMENT: ICD-10-CM

## 2023-12-03 DIAGNOSIS — Z99.2 ESRD ON DIALYSIS (HCC): ICD-10-CM

## 2023-12-03 LAB
ALBUMIN SERPL-MCNC: 3.4 G/DL (ref 3.4–5)
ALBUMIN/GLOB SERPL: 1.3 {RATIO} (ref 1.1–2.2)
ALP SERPL-CCNC: 62 U/L (ref 40–129)
ALT SERPL-CCNC: 11 U/L (ref 10–40)
ANION GAP SERPL CALCULATED.3IONS-SCNC: 11 MMOL/L (ref 3–16)
AST SERPL-CCNC: 14 U/L (ref 15–37)
BASE EXCESS BLDV CALC-SCNC: -0.4 MMOL/L (ref -3–3)
BASOPHILS # BLD: 0.2 K/UL (ref 0–0.2)
BASOPHILS NFR BLD: 1.3 %
BILIRUB SERPL-MCNC: <0.2 MG/DL (ref 0–1)
BUN SERPL-MCNC: 48 MG/DL (ref 7–20)
CALCIUM SERPL-MCNC: 8.8 MG/DL (ref 8.3–10.6)
CHLORIDE SERPL-SCNC: 104 MMOL/L (ref 99–110)
CO2 BLDV-SCNC: 25 MMOL/L
CO2 SERPL-SCNC: 23 MMOL/L (ref 21–32)
COHGB MFR BLDV: 4.4 % (ref 0–1.5)
CREAT SERPL-MCNC: 5.1 MG/DL (ref 0.9–1.3)
DEPRECATED RDW RBC AUTO: 16 % (ref 12.4–15.4)
EKG ATRIAL RATE: 81 BPM
EKG ATRIAL RATE: 85 BPM
EKG DIAGNOSIS: NORMAL
EKG DIAGNOSIS: NORMAL
EKG P AXIS: 74 DEGREES
EKG P AXIS: 75 DEGREES
EKG P-R INTERVAL: 126 MS
EKG P-R INTERVAL: 128 MS
EKG Q-T INTERVAL: 392 MS
EKG Q-T INTERVAL: 408 MS
EKG QRS DURATION: 84 MS
EKG QRS DURATION: 86 MS
EKG QTC CALCULATION (BAZETT): 466 MS
EKG QTC CALCULATION (BAZETT): 473 MS
EKG R AXIS: 72 DEGREES
EKG R AXIS: 73 DEGREES
EKG T AXIS: 119 DEGREES
EKG T AXIS: 120 DEGREES
EKG VENTRICULAR RATE: 81 BPM
EKG VENTRICULAR RATE: 85 BPM
EOSINOPHIL # BLD: 0.7 K/UL (ref 0–0.6)
EOSINOPHIL NFR BLD: 5.7 %
FLUAV RNA RESP QL NAA+PROBE: NOT DETECTED
FLUBV RNA RESP QL NAA+PROBE: NOT DETECTED
GFR SERPLBLD CREATININE-BSD FMLA CKD-EPI: 13 ML/MIN/{1.73_M2}
GLUCOSE SERPL-MCNC: 113 MG/DL (ref 70–99)
HCO3 BLDV-SCNC: 23.4 MMOL/L (ref 23–29)
HCT VFR BLD AUTO: 35 % (ref 40.5–52.5)
HGB BLD-MCNC: 11.8 G/DL (ref 13.5–17.5)
INR PPP: 1.01 (ref 0.84–1.16)
LACTATE BLDV-SCNC: 0.9 MMOL/L (ref 0.4–1.9)
LIPASE SERPL-CCNC: 23 U/L (ref 13–60)
LYMPHOCYTES # BLD: 1.8 K/UL (ref 1–5.1)
LYMPHOCYTES NFR BLD: 15.4 %
MCH RBC QN AUTO: 28.8 PG (ref 26–34)
MCHC RBC AUTO-ENTMCNC: 33.7 G/DL (ref 31–36)
MCV RBC AUTO: 85.6 FL (ref 80–100)
METHGB MFR BLDV: 0.3 %
MONOCYTES # BLD: 1 K/UL (ref 0–1.3)
MONOCYTES NFR BLD: 8.8 %
NEUTROPHILS # BLD: 8 K/UL (ref 1.7–7.7)
NEUTROPHILS NFR BLD: 68.8 %
NT-PROBNP SERPL-MCNC: ABNORMAL PG/ML (ref 0–124)
O2 CT VFR BLDV CALC: 11 VOL %
O2 THERAPY: ABNORMAL
PCO2 BLDV: 35.7 MMHG (ref 40–50)
PH BLDV: 7.43 [PH] (ref 7.35–7.45)
PLATELET # BLD AUTO: 255 K/UL (ref 135–450)
PMV BLD AUTO: 8.9 FL (ref 5–10.5)
PO2 BLDV: 32.6 MMHG (ref 25–40)
POTASSIUM SERPL-SCNC: 4.5 MMOL/L (ref 3.5–5.1)
PROT SERPL-MCNC: 6 G/DL (ref 6.4–8.2)
PROTHROMBIN TIME: 13.3 SEC (ref 11.5–14.8)
RBC # BLD AUTO: 4.09 M/UL (ref 4.2–5.9)
SAO2 % BLDV: 65 %
SARS-COV-2 RNA RESP QL NAA+PROBE: NOT DETECTED
SODIUM SERPL-SCNC: 138 MMOL/L (ref 136–145)
TROPONIN, HIGH SENSITIVITY: 164 NG/L (ref 0–22)
TROPONIN, HIGH SENSITIVITY: 169 NG/L (ref 0–22)
WBC # BLD AUTO: 11.7 K/UL (ref 4–11)

## 2023-12-03 PROCEDURE — 99285 EMERGENCY DEPT VISIT HI MDM: CPT

## 2023-12-03 PROCEDURE — 93005 ELECTROCARDIOGRAM TRACING: CPT | Performed by: PHYSICIAN ASSISTANT

## 2023-12-03 PROCEDURE — 6370000000 HC RX 637 (ALT 250 FOR IP): Performed by: PHYSICIAN ASSISTANT

## 2023-12-03 PROCEDURE — 80053 COMPREHEN METABOLIC PANEL: CPT

## 2023-12-03 PROCEDURE — 83605 ASSAY OF LACTIC ACID: CPT

## 2023-12-03 PROCEDURE — 36415 COLL VENOUS BLD VENIPUNCTURE: CPT

## 2023-12-03 PROCEDURE — 84484 ASSAY OF TROPONIN QUANT: CPT

## 2023-12-03 PROCEDURE — 93010 ELECTROCARDIOGRAM REPORT: CPT | Performed by: INTERNAL MEDICINE

## 2023-12-03 PROCEDURE — 87636 SARSCOV2 & INF A&B AMP PRB: CPT

## 2023-12-03 PROCEDURE — 85610 PROTHROMBIN TIME: CPT

## 2023-12-03 PROCEDURE — 83880 ASSAY OF NATRIURETIC PEPTIDE: CPT

## 2023-12-03 PROCEDURE — 87040 BLOOD CULTURE FOR BACTERIA: CPT

## 2023-12-03 PROCEDURE — 83690 ASSAY OF LIPASE: CPT

## 2023-12-03 PROCEDURE — 71046 X-RAY EXAM CHEST 2 VIEWS: CPT

## 2023-12-03 PROCEDURE — 82803 BLOOD GASES ANY COMBINATION: CPT

## 2023-12-03 PROCEDURE — 85025 COMPLETE CBC W/AUTO DIFF WBC: CPT

## 2023-12-03 PROCEDURE — 93005 ELECTROCARDIOGRAM TRACING: CPT | Performed by: STUDENT IN AN ORGANIZED HEALTH CARE EDUCATION/TRAINING PROGRAM

## 2023-12-03 RX ORDER — ASPIRIN 81 MG/1
81 TABLET, CHEWABLE ORAL ONCE
Status: COMPLETED | OUTPATIENT
Start: 2023-12-03 | End: 2023-12-03

## 2023-12-03 RX ORDER — CARVEDILOL 12.5 MG/1
12.5 TABLET ORAL 2 TIMES DAILY WITH MEALS
COMMUNITY
Start: 2023-08-15

## 2023-12-03 RX ORDER — FAMOTIDINE 20 MG/1
20 TABLET, FILM COATED ORAL EVERY OTHER DAY
COMMUNITY
Start: 2023-05-24

## 2023-12-03 RX ORDER — HYDRALAZINE HYDROCHLORIDE 100 MG/1
100 TABLET, FILM COATED ORAL 3 TIMES DAILY
COMMUNITY
Start: 2023-10-05

## 2023-12-03 RX ORDER — EZETIMIBE 10 MG/1
10 TABLET ORAL DAILY
COMMUNITY
Start: 2022-02-08

## 2023-12-03 RX ORDER — NIFEDIPINE 60 MG/1
60 TABLET, EXTENDED RELEASE ORAL 2 TIMES DAILY
COMMUNITY
Start: 2022-06-02

## 2023-12-03 RX ORDER — ATORVASTATIN CALCIUM 80 MG/1
80 TABLET, FILM COATED ORAL NIGHTLY
COMMUNITY
Start: 2022-02-08

## 2023-12-03 RX ORDER — ASCORBIC ACID, THIAMINE, RIBOFLAVIN, NIACINAMIDE, PYRIDOXINE, FOLIC ACID, COBALAMIN, BIOTIN, PANTOTHENIC ACID 100; 1.5; 1.7; 20; 10; 1; 6; 300; 1 MG/1; MG/1; MG/1; MG/1; MG/1; MG/1; UG/1; UG/1; MG/1
TABLET, COATED ORAL
COMMUNITY
Start: 2023-11-28

## 2023-12-03 RX ORDER — CLOPIDOGREL BISULFATE 75 MG/1
75 TABLET ORAL DAILY
COMMUNITY
Start: 2023-05-25

## 2023-12-03 RX ORDER — ISOSORBIDE MONONITRATE 30 MG/1
30 TABLET, EXTENDED RELEASE ORAL DAILY
COMMUNITY
Start: 2023-08-25

## 2023-12-03 RX ORDER — ASPIRIN 81 MG/1
162 TABLET, CHEWABLE ORAL ONCE
Status: COMPLETED | OUTPATIENT
Start: 2023-12-03 | End: 2023-12-03

## 2023-12-03 RX ORDER — LINAGLIPTIN 5 MG/1
5 TABLET, FILM COATED ORAL DAILY
COMMUNITY
Start: 2023-09-12

## 2023-12-03 RX ADMIN — ASPIRIN 162 MG: 81 TABLET, CHEWABLE ORAL at 17:29

## 2023-12-03 RX ADMIN — ASPIRIN 81 MG: 81 TABLET, CHEWABLE ORAL at 17:29

## 2023-12-03 ASSESSMENT — PAIN DESCRIPTION - ORIENTATION: ORIENTATION: RIGHT

## 2023-12-03 ASSESSMENT — LIFESTYLE VARIABLES
HOW OFTEN DO YOU HAVE A DRINK CONTAINING ALCOHOL: NEVER
HOW MANY STANDARD DRINKS CONTAINING ALCOHOL DO YOU HAVE ON A TYPICAL DAY: PATIENT DOES NOT DRINK

## 2023-12-03 ASSESSMENT — PAIN DESCRIPTION - DIRECTION: RADIATING_TOWARDS: DENIES

## 2023-12-03 ASSESSMENT — PAIN DESCRIPTION - DESCRIPTORS: DESCRIPTORS: ACHING

## 2023-12-03 ASSESSMENT — PAIN SCALES - GENERAL: PAINLEVEL_OUTOF10: 6

## 2023-12-03 ASSESSMENT — PAIN DESCRIPTION - LOCATION: LOCATION: CHEST

## 2023-12-03 ASSESSMENT — PAIN - FUNCTIONAL ASSESSMENT: PAIN_FUNCTIONAL_ASSESSMENT: 0-10

## 2023-12-04 NOTE — ED NOTES
Pt stated he informed  he was going to leave and Dr. Anh Flores discussed that he wanted him to stay until all tests complete and possible admission, Dr. Anh Flores currently with critically ill pt and unable to speak with pt at this time. Pt unwilling to wait to speak with the physician. IV removed.       Estelle Cuellar RN  12/03/23 2002

## 2023-12-04 NOTE — ED NOTES
Pt is requesting an AMA form. He wants to go to his dialysis appt tomorrow and then will call his caridologist at [facility]. Dr. Anh Flores is aware pt is signing out [facility].       Estelle Cuellar RN  12/03/23 1942

## 2023-12-04 NOTE — ED PROVIDER NOTES
[address] [address] ED  EMERGENCY DEPARTMENT ENCOUNTER        Pt Name: Clair Rhodes  MRN: [id number]  [address] [facility] 1970  Date of evaluation: 12/3/2023  Provider: KARELY Edwards  PCP: Halima Solorzano  Note Started: 4:26 PM EST 12/3/23       I have seen and evaluated this patient with my supervising physician Emma CORREA [address] [address]       Chief Complaint   Patient presents with    Shortness of Breath     Arrived via EMS. SOB started at 0415, hasn't gotten any better. Previous heart attack and stroke. Chest Pain     In right side, denies radiating down arm. HISTORY OF PRESENT ILLNESS: 1 or more Elements     History from : Patient    Limitations to history : None    Clair Rhodes is a 48 y.o. male with past medical history of chronic kidney disease, hypertension, hyperlipidemia, type 2 diabetes opiate abuse, PTSD, hepatitis C, bipolar 1 disorder who presents for evaluation of chest pain and shortness of breath. Patient has past medical history of prior STEMI May 2023 status post PCI with MARVA of LAD OM1 and RCA. Patient did not complete cardiac rehab. He continues to smoke tobacco.  He notes that today he woke up this morning and he was woken from sleep with shortness of breath, a feeling similar to how he felt when he had his heart attack. He endorses chronic right-sided chest pain which is unchanged. He has history of ESRD on hemodialysis every Monday Wednesday Friday he had dialysis on Friday full session without complication. He denies volume overload. He notes the shortness of breath is not exertional and nothing seems to make it better or worse other than maybe lying flat. He denies cough or URI symptoms. He does endorse a chest pressure that started this morning.   He has not taken any medicine for his symptoms other than his normal medications which she takes in a pill pack, he is not sure what is included in that but he thinks a daily baby aspirin
patient does desire discharge given that he has improved and pending a stable troponin and remaining asymptomatic this would seem reasonable and I believe a great benefit for him to attend dialysis in the morning and close follow-up to his primary. [NG]      ED Course User Index  [NG] Frannie Monte MD     I independently reviewed the ECG as follows:    Sinus rhythm at a rate of 81 without ectopy. Normal axis and intervals. T wave inversions in lead I, aVL, V5 and V6 which have been previously described on outside records. No clear evidence of acute ischemia. EKG is otherwise not significantly changed when compared to prior dated March 22, 2020. For further details of Esme MONCADA DU [facility] emergency department encounter, please see the ANNEL/resident's documentation.       MD Frannie Lyons MD  12/03/23 Damaris Escalona

## 2023-12-07 LAB
BACTERIA BLD CULT ORG #2: NORMAL
BACTERIA BLD CULT: NORMAL

## 2023-12-31 ENCOUNTER — TELEPHONE (OUTPATIENT)
Dept: CASE MANAGEMENT | Age: 53
End: 2023-12-31

## 2024-02-23 ENCOUNTER — HOSPITAL ENCOUNTER (INPATIENT)
Age: 54
LOS: 6 days | Discharge: HOME | DRG: 194 | End: 2024-03-02
Attending: STUDENT IN AN ORGANIZED HEALTH CARE EDUCATION/TRAINING PROGRAM | Admitting: STUDENT IN AN ORGANIZED HEALTH CARE EDUCATION/TRAINING PROGRAM
Payer: MEDICAID

## 2024-02-23 ENCOUNTER — APPOINTMENT (OUTPATIENT)
Dept: GENERAL RADIOLOGY | Age: 54
DRG: 194 | End: 2024-02-23
Payer: MEDICAID

## 2024-02-23 DIAGNOSIS — R94.31 ST SEGMENT CHANGES ON ELECTROCARDIOGRAM: ICD-10-CM

## 2024-02-23 DIAGNOSIS — R07.9 CHEST PAIN, UNSPECIFIED TYPE: Primary | ICD-10-CM

## 2024-02-23 LAB
ALBUMIN SERPL-MCNC: 3.9 G/DL (ref 3.4–5)
ALBUMIN/GLOB SERPL: 1.2 {RATIO} (ref 1.1–2.2)
ALP SERPL-CCNC: 55 U/L (ref 40–129)
ALT SERPL-CCNC: 7 U/L (ref 10–40)
ANION GAP SERPL CALCULATED.3IONS-SCNC: 15 MMOL/L (ref 3–16)
AST SERPL-CCNC: 12 U/L (ref 15–37)
BASOPHILS # BLD: 0.1 K/UL (ref 0–0.2)
BASOPHILS NFR BLD: 1.1 %
BILIRUB SERPL-MCNC: <0.2 MG/DL (ref 0–1)
BUN SERPL-MCNC: 53 MG/DL (ref 7–20)
CALCIUM SERPL-MCNC: 9.2 MG/DL (ref 8.3–10.6)
CHLORIDE SERPL-SCNC: 105 MMOL/L (ref 99–110)
CO2 SERPL-SCNC: 20 MMOL/L (ref 21–32)
CREAT SERPL-MCNC: 6.8 MG/DL (ref 0.9–1.3)
DEPRECATED RDW RBC AUTO: 14.5 % (ref 12.4–15.4)
EOSINOPHIL # BLD: 0.1 K/UL (ref 0–0.6)
EOSINOPHIL NFR BLD: 1.2 %
FLUAV RNA RESP QL NAA+PROBE: NOT DETECTED
FLUBV RNA RESP QL NAA+PROBE: NOT DETECTED
GFR SERPLBLD CREATININE-BSD FMLA CKD-EPI: 9 ML/MIN/{1.73_M2}
GLUCOSE SERPL-MCNC: 145 MG/DL (ref 70–99)
HCT VFR BLD AUTO: 32.6 % (ref 40.5–52.5)
HGB BLD-MCNC: 10.7 G/DL (ref 13.5–17.5)
LYMPHOCYTES # BLD: 0.4 K/UL (ref 1–5.1)
LYMPHOCYTES NFR BLD: 5.2 %
MCH RBC QN AUTO: 29.5 PG (ref 26–34)
MCHC RBC AUTO-ENTMCNC: 32.8 G/DL (ref 31–36)
MCV RBC AUTO: 90 FL (ref 80–100)
MONOCYTES # BLD: 1 K/UL (ref 0–1.3)
MONOCYTES NFR BLD: 12.1 %
NEUTROPHILS # BLD: 6.6 K/UL (ref 1.7–7.7)
NEUTROPHILS NFR BLD: 80.4 %
PLATELET # BLD AUTO: 280 K/UL (ref 135–450)
PMV BLD AUTO: 8 FL (ref 5–10.5)
POTASSIUM SERPL-SCNC: 5.4 MMOL/L (ref 3.5–5.1)
PROT SERPL-MCNC: 7.2 G/DL (ref 6.4–8.2)
RBC # BLD AUTO: 3.63 M/UL (ref 4.2–5.9)
SARS-COV-2 RNA RESP QL NAA+PROBE: NOT DETECTED
SODIUM SERPL-SCNC: 140 MMOL/L (ref 136–145)
TROPONIN, HIGH SENSITIVITY: 199 NG/L (ref 0–22)
TROPONIN, HIGH SENSITIVITY: 216 NG/L (ref 0–22)
WBC # BLD AUTO: 8.3 K/UL (ref 4–11)

## 2024-02-23 PROCEDURE — G0378 HOSPITAL OBSERVATION PER HR: HCPCS

## 2024-02-23 PROCEDURE — 71045 X-RAY EXAM CHEST 1 VIEW: CPT

## 2024-02-23 PROCEDURE — 87636 SARSCOV2 & INF A&B AMP PRB: CPT

## 2024-02-23 PROCEDURE — 99285 EMERGENCY DEPT VISIT HI MDM: CPT

## 2024-02-23 PROCEDURE — 84484 ASSAY OF TROPONIN QUANT: CPT

## 2024-02-23 PROCEDURE — 93005 ELECTROCARDIOGRAM TRACING: CPT | Performed by: STUDENT IN AN ORGANIZED HEALTH CARE EDUCATION/TRAINING PROGRAM

## 2024-02-23 PROCEDURE — 96374 THER/PROPH/DIAG INJ IV PUSH: CPT

## 2024-02-23 PROCEDURE — 6370000000 HC RX 637 (ALT 250 FOR IP): Performed by: STUDENT IN AN ORGANIZED HEALTH CARE EDUCATION/TRAINING PROGRAM

## 2024-02-23 PROCEDURE — 6370000000 HC RX 637 (ALT 250 FOR IP): Performed by: PHYSICIAN ASSISTANT

## 2024-02-23 PROCEDURE — 85025 COMPLETE CBC W/AUTO DIFF WBC: CPT

## 2024-02-23 PROCEDURE — 2580000003 HC RX 258: Performed by: STUDENT IN AN ORGANIZED HEALTH CARE EDUCATION/TRAINING PROGRAM

## 2024-02-23 PROCEDURE — 6360000002 HC RX W HCPCS: Performed by: NURSE PRACTITIONER

## 2024-02-23 PROCEDURE — 6370000000 HC RX 637 (ALT 250 FOR IP): Performed by: NURSE PRACTITIONER

## 2024-02-23 PROCEDURE — 80053 COMPREHEN METABOLIC PANEL: CPT

## 2024-02-23 RX ORDER — SODIUM CHLORIDE 0.9 % (FLUSH) 0.9 %
5-40 SYRINGE (ML) INJECTION EVERY 12 HOURS SCHEDULED
Status: DISCONTINUED | OUTPATIENT
Start: 2024-02-23 | End: 2024-03-02 | Stop reason: HOSPADM

## 2024-02-23 RX ORDER — ONDANSETRON 4 MG/1
4 TABLET, ORALLY DISINTEGRATING ORAL EVERY 8 HOURS PRN
Status: DISCONTINUED | OUTPATIENT
Start: 2024-02-23 | End: 2024-03-02 | Stop reason: HOSPADM

## 2024-02-23 RX ORDER — QUETIAPINE FUMARATE 100 MG/1
200 TABLET, FILM COATED ORAL NIGHTLY
Status: DISCONTINUED | OUTPATIENT
Start: 2024-02-24 | End: 2024-03-02 | Stop reason: HOSPADM

## 2024-02-23 RX ORDER — ACETAMINOPHEN 325 MG/1
650 TABLET ORAL EVERY 6 HOURS PRN
Status: DISCONTINUED | OUTPATIENT
Start: 2024-02-23 | End: 2024-03-02 | Stop reason: HOSPADM

## 2024-02-23 RX ORDER — SODIUM CHLORIDE 9 MG/ML
INJECTION, SOLUTION INTRAVENOUS PRN
Status: DISCONTINUED | OUTPATIENT
Start: 2024-02-23 | End: 2024-03-02 | Stop reason: HOSPADM

## 2024-02-23 RX ORDER — ATORVASTATIN CALCIUM 40 MG/1
40 TABLET, FILM COATED ORAL NIGHTLY
Status: DISCONTINUED | OUTPATIENT
Start: 2024-02-23 | End: 2024-03-02 | Stop reason: HOSPADM

## 2024-02-23 RX ORDER — BENZONATATE 100 MG/1
100 CAPSULE ORAL 3 TIMES DAILY PRN
Status: DISCONTINUED | OUTPATIENT
Start: 2024-02-23 | End: 2024-03-02 | Stop reason: HOSPADM

## 2024-02-23 RX ORDER — ASPIRIN 81 MG/1
324 TABLET, CHEWABLE ORAL ONCE
Status: DISCONTINUED | OUTPATIENT
Start: 2024-02-23 | End: 2024-02-24

## 2024-02-23 RX ORDER — ONDANSETRON 2 MG/ML
4 INJECTION INTRAMUSCULAR; INTRAVENOUS EVERY 6 HOURS PRN
Status: DISCONTINUED | OUTPATIENT
Start: 2024-02-23 | End: 2024-03-02 | Stop reason: HOSPADM

## 2024-02-23 RX ORDER — ACETAMINOPHEN 650 MG/1
650 SUPPOSITORY RECTAL EVERY 6 HOURS PRN
Status: DISCONTINUED | OUTPATIENT
Start: 2024-02-23 | End: 2024-03-02 | Stop reason: HOSPADM

## 2024-02-23 RX ORDER — SODIUM CHLORIDE 0.9 % (FLUSH) 0.9 %
5-40 SYRINGE (ML) INJECTION PRN
Status: DISCONTINUED | OUTPATIENT
Start: 2024-02-23 | End: 2024-03-02 | Stop reason: HOSPADM

## 2024-02-23 RX ORDER — ASPIRIN 81 MG/1
81 TABLET, CHEWABLE ORAL DAILY
Status: DISCONTINUED | OUTPATIENT
Start: 2024-02-24 | End: 2024-03-02 | Stop reason: HOSPADM

## 2024-02-23 RX ORDER — HYDROMORPHONE HYDROCHLORIDE 1 MG/ML
0.5 INJECTION, SOLUTION INTRAMUSCULAR; INTRAVENOUS; SUBCUTANEOUS ONCE
Status: COMPLETED | OUTPATIENT
Start: 2024-02-24 | End: 2024-02-23

## 2024-02-23 RX ORDER — OMEPRAZOLE 20 MG/1
40 CAPSULE, DELAYED RELEASE ORAL DAILY
Status: ON HOLD | COMMUNITY
End: 2024-03-02 | Stop reason: HOSPADM

## 2024-02-23 RX ORDER — ACETAMINOPHEN 500 MG
1000 TABLET ORAL ONCE
Status: COMPLETED | OUTPATIENT
Start: 2024-02-23 | End: 2024-02-23

## 2024-02-23 RX ORDER — NITROGLYCERIN 0.4 MG/1
0.4 TABLET SUBLINGUAL ONCE
Status: COMPLETED | OUTPATIENT
Start: 2024-02-23 | End: 2024-02-23

## 2024-02-23 RX ADMIN — ACETAMINOPHEN 1000 MG: 500 TABLET ORAL at 21:00

## 2024-02-23 RX ADMIN — BENZONATATE 100 MG: 100 CAPSULE ORAL at 23:59

## 2024-02-23 RX ADMIN — Medication 10 ML: at 23:27

## 2024-02-23 RX ADMIN — HYDROMORPHONE HYDROCHLORIDE 0.5 MG: 1 INJECTION, SOLUTION INTRAMUSCULAR; INTRAVENOUS; SUBCUTANEOUS at 23:51

## 2024-02-23 RX ADMIN — ATORVASTATIN CALCIUM 40 MG: 40 TABLET, FILM COATED ORAL at 23:26

## 2024-02-23 RX ADMIN — ACETAMINOPHEN 650 MG: 325 TABLET ORAL at 23:26

## 2024-02-23 RX ADMIN — QUETIAPINE FUMARATE 200 MG: 100 TABLET ORAL at 23:51

## 2024-02-23 RX ADMIN — NITROGLYCERIN 0.4 MG: 0.4 TABLET, ORALLY DISINTEGRATING SUBLINGUAL at 22:05

## 2024-02-23 ASSESSMENT — PAIN DESCRIPTION - LOCATION
LOCATION: CHEST
LOCATION: CHEST
LOCATION: GENERALIZED
LOCATION: CHEST
LOCATION: CHEST

## 2024-02-23 ASSESSMENT — PAIN SCALES - GENERAL
PAINLEVEL_OUTOF10: 6
PAINLEVEL_OUTOF10: 8
PAINLEVEL_OUTOF10: 7
PAINLEVEL_OUTOF10: 8
PAINLEVEL_OUTOF10: 8
PAINLEVEL_OUTOF10: 2

## 2024-02-23 ASSESSMENT — LIFESTYLE VARIABLES
HOW MANY STANDARD DRINKS CONTAINING ALCOHOL DO YOU HAVE ON A TYPICAL DAY: PATIENT DOES NOT DRINK
HOW OFTEN DO YOU HAVE A DRINK CONTAINING ALCOHOL: NEVER

## 2024-02-23 ASSESSMENT — PAIN DESCRIPTION - ORIENTATION: ORIENTATION: OTHER (COMMENT)

## 2024-02-23 ASSESSMENT — PAIN DESCRIPTION - DESCRIPTORS
DESCRIPTORS: ACHING;DISCOMFORT
DESCRIPTORS: ACHING

## 2024-02-23 ASSESSMENT — PAIN - FUNCTIONAL ASSESSMENT: PAIN_FUNCTIONAL_ASSESSMENT: 0-10

## 2024-02-24 LAB
ANION GAP SERPL CALCULATED.3IONS-SCNC: 16 MMOL/L (ref 3–16)
ANTI-XA UNFRAC HEPARIN: <0.1 IU/ML (ref 0.3–0.7)
APTT BLD: 33.7 SEC (ref 22.7–35.9)
BUN SERPL-MCNC: 59 MG/DL (ref 7–20)
CALCIUM SERPL-MCNC: 8.7 MG/DL (ref 8.3–10.6)
CHLORIDE SERPL-SCNC: 103 MMOL/L (ref 99–110)
CHOLEST SERPL-MCNC: 121 MG/DL (ref 0–199)
CO2 SERPL-SCNC: 16 MMOL/L (ref 21–32)
CREAT SERPL-MCNC: 6.8 MG/DL (ref 0.9–1.3)
DEPRECATED RDW RBC AUTO: 14.6 % (ref 12.4–15.4)
DEPRECATED RDW RBC AUTO: 14.8 % (ref 12.4–15.4)
EKG ATRIAL RATE: 79 BPM
EKG ATRIAL RATE: 80 BPM
EKG ATRIAL RATE: 82 BPM
EKG DIAGNOSIS: NORMAL
EKG P AXIS: 77 DEGREES
EKG P AXIS: 79 DEGREES
EKG P AXIS: 98 DEGREES
EKG P-R INTERVAL: 120 MS
EKG P-R INTERVAL: 122 MS
EKG P-R INTERVAL: 126 MS
EKG Q-T INTERVAL: 390 MS
EKG Q-T INTERVAL: 392 MS
EKG Q-T INTERVAL: 398 MS
EKG QRS DURATION: 80 MS
EKG QRS DURATION: 80 MS
EKG QRS DURATION: 84 MS
EKG QTC CALCULATION (BAZETT): 449 MS
EKG QTC CALCULATION (BAZETT): 456 MS
EKG QTC CALCULATION (BAZETT): 457 MS
EKG R AXIS: 100 DEGREES
EKG R AXIS: 80 DEGREES
EKG R AXIS: 96 DEGREES
EKG T AXIS: 111 DEGREES
EKG T AXIS: 76 DEGREES
EKG T AXIS: 90 DEGREES
EKG VENTRICULAR RATE: 79 BPM
EKG VENTRICULAR RATE: 80 BPM
EKG VENTRICULAR RATE: 82 BPM
GFR SERPLBLD CREATININE-BSD FMLA CKD-EPI: 9 ML/MIN/{1.73_M2}
GLUCOSE SERPL-MCNC: 120 MG/DL (ref 70–99)
HCT VFR BLD AUTO: 29.3 % (ref 40.5–52.5)
HCT VFR BLD AUTO: 30.4 % (ref 40.5–52.5)
HDLC SERPL-MCNC: 25 MG/DL (ref 40–60)
HGB BLD-MCNC: 9.7 G/DL (ref 13.5–17.5)
HGB BLD-MCNC: 9.8 G/DL (ref 13.5–17.5)
INR PPP: 1.24 (ref 0.84–1.16)
LDLC SERPL CALC-MCNC: 73 MG/DL
MCH RBC QN AUTO: 29.2 PG (ref 26–34)
MCH RBC QN AUTO: 30 PG (ref 26–34)
MCHC RBC AUTO-ENTMCNC: 32.3 G/DL (ref 31–36)
MCHC RBC AUTO-ENTMCNC: 33.1 G/DL (ref 31–36)
MCV RBC AUTO: 90.4 FL (ref 80–100)
MCV RBC AUTO: 90.6 FL (ref 80–100)
PLATELET # BLD AUTO: 231 K/UL (ref 135–450)
PLATELET # BLD AUTO: 236 K/UL (ref 135–450)
PMV BLD AUTO: 8.2 FL (ref 5–10.5)
PMV BLD AUTO: 8.2 FL (ref 5–10.5)
POTASSIUM SERPL-SCNC: 5.3 MMOL/L (ref 3.5–5.1)
PROTHROMBIN TIME: 15.6 SEC (ref 11.5–14.8)
RBC # BLD AUTO: 3.24 M/UL (ref 4.2–5.9)
RBC # BLD AUTO: 3.36 M/UL (ref 4.2–5.9)
SODIUM SERPL-SCNC: 135 MMOL/L (ref 136–145)
TRIGL SERPL-MCNC: 117 MG/DL (ref 0–150)
VLDLC SERPL CALC-MCNC: 23 MG/DL
WBC # BLD AUTO: 13.2 K/UL (ref 4–11)
WBC # BLD AUTO: 8 K/UL (ref 4–11)

## 2024-02-24 PROCEDURE — 6370000000 HC RX 637 (ALT 250 FOR IP): Performed by: NURSE PRACTITIONER

## 2024-02-24 PROCEDURE — 93010 ELECTROCARDIOGRAM REPORT: CPT | Performed by: INTERNAL MEDICINE

## 2024-02-24 PROCEDURE — 99223 1ST HOSP IP/OBS HIGH 75: CPT | Performed by: INTERNAL MEDICINE

## 2024-02-24 PROCEDURE — 85520 HEPARIN ASSAY: CPT

## 2024-02-24 PROCEDURE — 6360000002 HC RX W HCPCS: Performed by: INTERNAL MEDICINE

## 2024-02-24 PROCEDURE — 85610 PROTHROMBIN TIME: CPT

## 2024-02-24 PROCEDURE — 85730 THROMBOPLASTIN TIME PARTIAL: CPT

## 2024-02-24 PROCEDURE — 96376 TX/PRO/DX INJ SAME DRUG ADON: CPT

## 2024-02-24 PROCEDURE — 85027 COMPLETE CBC AUTOMATED: CPT

## 2024-02-24 PROCEDURE — 6370000000 HC RX 637 (ALT 250 FOR IP): Performed by: INTERNAL MEDICINE

## 2024-02-24 PROCEDURE — 86706 HEP B SURFACE ANTIBODY: CPT

## 2024-02-24 PROCEDURE — 6360000002 HC RX W HCPCS: Performed by: STUDENT IN AN ORGANIZED HEALTH CARE EDUCATION/TRAINING PROGRAM

## 2024-02-24 PROCEDURE — 80061 LIPID PANEL: CPT

## 2024-02-24 PROCEDURE — 6370000000 HC RX 637 (ALT 250 FOR IP): Performed by: STUDENT IN AN ORGANIZED HEALTH CARE EDUCATION/TRAINING PROGRAM

## 2024-02-24 PROCEDURE — 96375 TX/PRO/DX INJ NEW DRUG ADDON: CPT

## 2024-02-24 PROCEDURE — G0378 HOSPITAL OBSERVATION PER HR: HCPCS

## 2024-02-24 PROCEDURE — 5A1D70Z PERFORMANCE OF URINARY FILTRATION, INTERMITTENT, LESS THAN 6 HOURS PER DAY: ICD-10-PCS | Performed by: INTERNAL MEDICINE

## 2024-02-24 PROCEDURE — 93005 ELECTROCARDIOGRAM TRACING: CPT | Performed by: STUDENT IN AN ORGANIZED HEALTH CARE EDUCATION/TRAINING PROGRAM

## 2024-02-24 PROCEDURE — 2580000003 HC RX 258: Performed by: STUDENT IN AN ORGANIZED HEALTH CARE EDUCATION/TRAINING PROGRAM

## 2024-02-24 PROCEDURE — 90935 HEMODIALYSIS ONE EVALUATION: CPT

## 2024-02-24 PROCEDURE — 80048 BASIC METABOLIC PNL TOTAL CA: CPT

## 2024-02-24 PROCEDURE — 87340 HEPATITIS B SURFACE AG IA: CPT

## 2024-02-24 PROCEDURE — 83036 HEMOGLOBIN GLYCOSYLATED A1C: CPT

## 2024-02-24 PROCEDURE — 36415 COLL VENOUS BLD VENIPUNCTURE: CPT

## 2024-02-24 RX ORDER — HYDROMORPHONE HYDROCHLORIDE 1 MG/ML
0.5 INJECTION, SOLUTION INTRAMUSCULAR; INTRAVENOUS; SUBCUTANEOUS ONCE
Status: COMPLETED | OUTPATIENT
Start: 2024-02-24 | End: 2024-02-24

## 2024-02-24 RX ORDER — EZETIMIBE 10 MG/1
10 TABLET ORAL DAILY
Status: DISCONTINUED | OUTPATIENT
Start: 2024-02-24 | End: 2024-03-02 | Stop reason: HOSPADM

## 2024-02-24 RX ORDER — ISOSORBIDE MONONITRATE 30 MG/1
30 TABLET, EXTENDED RELEASE ORAL DAILY
Status: DISCONTINUED | OUTPATIENT
Start: 2024-02-24 | End: 2024-03-02 | Stop reason: HOSPADM

## 2024-02-24 RX ORDER — CARVEDILOL 6.25 MG/1
12.5 TABLET ORAL 2 TIMES DAILY WITH MEALS
Status: DISCONTINUED | OUTPATIENT
Start: 2024-02-24 | End: 2024-02-24

## 2024-02-24 RX ORDER — HYDROMORPHONE HYDROCHLORIDE 1 MG/ML
0.5 INJECTION, SOLUTION INTRAMUSCULAR; INTRAVENOUS; SUBCUTANEOUS EVERY 4 HOURS PRN
Status: DISCONTINUED | OUTPATIENT
Start: 2024-02-24 | End: 2024-03-01

## 2024-02-24 RX ORDER — NITROGLYCERIN 0.4 MG/1
0.4 TABLET SUBLINGUAL EVERY 5 MIN PRN
Status: DISCONTINUED | OUTPATIENT
Start: 2024-02-24 | End: 2024-03-02 | Stop reason: HOSPADM

## 2024-02-24 RX ORDER — HEPARIN SODIUM 5000 [USP'U]/ML
5000 INJECTION, SOLUTION INTRAVENOUS; SUBCUTANEOUS EVERY 8 HOURS SCHEDULED
Status: DISCONTINUED | OUTPATIENT
Start: 2024-02-24 | End: 2024-02-25

## 2024-02-24 RX ORDER — DOXAZOSIN 2 MG/1
2 TABLET ORAL NIGHTLY
Status: DISCONTINUED | OUTPATIENT
Start: 2024-02-24 | End: 2024-03-02 | Stop reason: HOSPADM

## 2024-02-24 RX ORDER — HEPARIN SODIUM 1000 [USP'U]/ML
60 INJECTION, SOLUTION INTRAVENOUS; SUBCUTANEOUS ONCE
Status: COMPLETED | OUTPATIENT
Start: 2024-02-24 | End: 2024-02-24

## 2024-02-24 RX ORDER — CLOPIDOGREL BISULFATE 75 MG/1
75 TABLET ORAL DAILY
Status: DISCONTINUED | OUTPATIENT
Start: 2024-02-24 | End: 2024-03-02 | Stop reason: HOSPADM

## 2024-02-24 RX ORDER — FAMOTIDINE 20 MG/1
20 TABLET, FILM COATED ORAL EVERY OTHER DAY
Status: DISCONTINUED | OUTPATIENT
Start: 2024-02-24 | End: 2024-02-24

## 2024-02-24 RX ORDER — HEPARIN SODIUM 1000 [USP'U]/ML
60 INJECTION, SOLUTION INTRAVENOUS; SUBCUTANEOUS PRN
Status: DISCONTINUED | OUTPATIENT
Start: 2024-02-24 | End: 2024-02-28

## 2024-02-24 RX ORDER — CARVEDILOL 25 MG/1
25 TABLET ORAL 2 TIMES DAILY WITH MEALS
Status: DISCONTINUED | OUTPATIENT
Start: 2024-02-24 | End: 2024-03-02 | Stop reason: HOSPADM

## 2024-02-24 RX ORDER — HYDRALAZINE HYDROCHLORIDE 50 MG/1
100 TABLET, FILM COATED ORAL 3 TIMES DAILY
Status: DISCONTINUED | OUTPATIENT
Start: 2024-02-24 | End: 2024-03-02 | Stop reason: HOSPADM

## 2024-02-24 RX ORDER — HEPARIN SODIUM 1000 [USP'U]/ML
30 INJECTION, SOLUTION INTRAVENOUS; SUBCUTANEOUS PRN
Status: DISCONTINUED | OUTPATIENT
Start: 2024-02-24 | End: 2024-02-28

## 2024-02-24 RX ORDER — NITROGLYCERIN 0.4 MG/1
0.4 TABLET SUBLINGUAL ONCE
Status: DISCONTINUED | OUTPATIENT
Start: 2024-02-24 | End: 2024-02-24

## 2024-02-24 RX ORDER — FLUTICASONE PROPIONATE 50 MCG
1 SPRAY, SUSPENSION (ML) NASAL DAILY
Status: DISCONTINUED | OUTPATIENT
Start: 2024-02-24 | End: 2024-03-02 | Stop reason: HOSPADM

## 2024-02-24 RX ORDER — HEPARIN SODIUM 10000 [USP'U]/100ML
1790 INJECTION, SOLUTION INTRAVENOUS CONTINUOUS
Status: DISCONTINUED | OUTPATIENT
Start: 2024-02-24 | End: 2024-02-28

## 2024-02-24 RX ORDER — SEVELAMER CARBONATE 800 MG/1
800 TABLET, FILM COATED ORAL
Status: DISCONTINUED | OUTPATIENT
Start: 2024-02-24 | End: 2024-03-02 | Stop reason: HOSPADM

## 2024-02-24 RX ORDER — PANTOPRAZOLE SODIUM 40 MG/1
40 TABLET, DELAYED RELEASE ORAL
Status: DISCONTINUED | OUTPATIENT
Start: 2024-02-24 | End: 2024-03-02 | Stop reason: HOSPADM

## 2024-02-24 RX ADMIN — FLUTICASONE PROPIONATE 1 SPRAY: 50 SPRAY, METERED NASAL at 05:17

## 2024-02-24 RX ADMIN — ASPIRIN 81 MG 81 MG: 81 TABLET ORAL at 09:22

## 2024-02-24 RX ADMIN — SEVELAMER CARBONATE 800 MG: 800 TABLET, FILM COATED ORAL at 09:23

## 2024-02-24 RX ADMIN — HYDRALAZINE HYDROCHLORIDE 100 MG: 50 TABLET ORAL at 09:22

## 2024-02-24 RX ADMIN — ACETAMINOPHEN 650 MG: 325 TABLET ORAL at 19:53

## 2024-02-24 RX ADMIN — HYDROMORPHONE HYDROCHLORIDE 0.5 MG: 1 INJECTION, SOLUTION INTRAMUSCULAR; INTRAVENOUS; SUBCUTANEOUS at 19:49

## 2024-02-24 RX ADMIN — ACETAMINOPHEN 650 MG: 325 TABLET ORAL at 05:43

## 2024-02-24 RX ADMIN — CARVEDILOL 12.5 MG: 6.25 TABLET, FILM COATED ORAL at 09:23

## 2024-02-24 RX ADMIN — HYDROMORPHONE HYDROCHLORIDE 0.5 MG: 1 INJECTION, SOLUTION INTRAMUSCULAR; INTRAVENOUS; SUBCUTANEOUS at 14:34

## 2024-02-24 RX ADMIN — HEPARIN SODIUM 760 UNITS/HR: 10000 INJECTION, SOLUTION INTRAVENOUS at 11:57

## 2024-02-24 RX ADMIN — HYDRALAZINE HYDROCHLORIDE 100 MG: 50 TABLET ORAL at 19:50

## 2024-02-24 RX ADMIN — Medication 10 ML: at 19:50

## 2024-02-24 RX ADMIN — NITROGLYCERIN 0.4 MG: 0.4 TABLET, ORALLY DISINTEGRATING SUBLINGUAL at 05:25

## 2024-02-24 RX ADMIN — CLOPIDOGREL BISULFATE 75 MG: 75 TABLET ORAL at 09:23

## 2024-02-24 RX ADMIN — HEPARIN SODIUM 3810 UNITS: 1000 INJECTION INTRAVENOUS; SUBCUTANEOUS at 11:55

## 2024-02-24 RX ADMIN — PANTOPRAZOLE SODIUM 40 MG: 40 TABLET, DELAYED RELEASE ORAL at 05:43

## 2024-02-24 RX ADMIN — CARVEDILOL 25 MG: 25 TABLET, FILM COATED ORAL at 19:09

## 2024-02-24 RX ADMIN — DOXAZOSIN 2 MG: 2 TABLET ORAL at 19:49

## 2024-02-24 RX ADMIN — SEVELAMER CARBONATE 800 MG: 800 TABLET, FILM COATED ORAL at 11:51

## 2024-02-24 RX ADMIN — ACETAMINOPHEN 650 MG: 325 TABLET ORAL at 11:51

## 2024-02-24 RX ADMIN — HYDROMORPHONE HYDROCHLORIDE 0.5 MG: 1 INJECTION, SOLUTION INTRAMUSCULAR; INTRAVENOUS; SUBCUTANEOUS at 05:47

## 2024-02-24 RX ADMIN — ATORVASTATIN CALCIUM 40 MG: 40 TABLET, FILM COATED ORAL at 19:49

## 2024-02-24 RX ADMIN — HEPARIN SODIUM 3810 UNITS: 1000 INJECTION INTRAVENOUS; SUBCUTANEOUS at 21:35

## 2024-02-24 RX ADMIN — Medication 10 ML: at 09:24

## 2024-02-24 RX ADMIN — ISOSORBIDE MONONITRATE 30 MG: 30 TABLET, EXTENDED RELEASE ORAL at 09:22

## 2024-02-24 RX ADMIN — EZETIMIBE 10 MG: 10 TABLET ORAL at 09:23

## 2024-02-24 RX ADMIN — NITROGLYCERIN 0.4 MG: 0.4 TABLET, ORALLY DISINTEGRATING SUBLINGUAL at 23:56

## 2024-02-24 RX ADMIN — QUETIAPINE FUMARATE 200 MG: 100 TABLET ORAL at 19:49

## 2024-02-24 RX ADMIN — HYDRALAZINE HYDROCHLORIDE 100 MG: 50 TABLET ORAL at 00:51

## 2024-02-24 RX ADMIN — DOXAZOSIN 2 MG: 2 TABLET ORAL at 00:51

## 2024-02-24 ASSESSMENT — PAIN DESCRIPTION - DESCRIPTORS
DESCRIPTORS: PATIENT UNABLE TO DESCRIBE
DESCRIPTORS: PATIENT UNABLE TO DESCRIBE

## 2024-02-24 ASSESSMENT — PAIN DESCRIPTION - LOCATION
LOCATION: CHEST

## 2024-02-24 ASSESSMENT — PAIN SCALES - GENERAL
PAINLEVEL_OUTOF10: 0
PAINLEVEL_OUTOF10: 8
PAINLEVEL_OUTOF10: 4

## 2024-02-24 NOTE — H&P
History and Physical      Name:  Heber Wheeler /Age/Sex: 1970  (53 y.o. male)   MRN & CSN:  [id number] & [id number] Encounter Date/Time: 2024 10:02 PM EST   Location:   PCP: Carline Pugh       Assessment and Plan:   Heber Wheeler is a 53 y.o. male with hypertension, hyperlipidemia, ESRD on HD MWF, bipolar type I, T2DM, chronic hepatitis C, chronic back pain, marijuana abuse presented from home with chest pain and flulike symptoms.    Atypical chest pain, rule out ACS  Likely secondary to fluid overload due to missed HD and uncontrolled hypertension  Troponin 216> 199 (has chronically elevated troponin trend)  EKG personally reviewed, NSR 80/min, nonspecific ST-T wave abnormalities in lateral leads  Aspirin load en route, continue aspirin, Plavix and Lipitor  Continue home Imdur and Coreg  Keep n.p.o. for cardiology evaluation in a.m.    Uncontrolled hypertension  Resume home carvedilol Imdur Cardura and hydralazine  Needs inpatient HD in a.m, consulted nephrology    ESRD on HD MWF  Hyperkalemia, K+ 5.4  Fluid overload secondary to missed HD  Unable to perform HD on Wednesday due to problem with graft.  Chest x-ray with cardiomegaly and pulmonary vascular congestion.  Nephrology consultation for inpatient management of HD    Acute on chronic combined heart failure  Secondary to missed HD  Previously EF 40 to 45% which is improved on most recent echocardiogram in 2023, EF 60 to 65%.  Hold Entresto due to hyperkalemia  Resume beta-blocker, hydralazine and Imdur as mentioned above  HD for volume management    Viral URTI  Negative rapid COVID influenza screen  Conservative management    Bipolar type I  Continue home Seroquel    Observation, PCU telemetry  Full code    Disposition:     Current Living situation: Home  Expected Disposition: Home  Estimated D/C: 2 days    Diet Diet NPO   DVT Prophylaxis [] Lovenox, [x]  Heparin, [] SCDs, [] Ambulation,  [] Eliquis, [] Xarelto,  Eloisa Shen PA   FreeStyle Lancets MISC 1 each by Does not apply route 4 times daily  Patient not taking: Reported on 2/23/2024 6/19/20   Eloisa Shen PA   INSULIN SYRINGE .5CC/29G 29G X 1/2\" 0.5 ML MISC 1 each by Does not apply route daily  Patient not taking: Reported on 2/23/2024 6/19/20   Eloisa Shen PA       Physical Exam:      General: NAD  Eyes: No scleral icterus or jaundice  ENT: neck supple, sinus congestion  Cardiovascular: Regular rate, left upper extremity AV graft  Respiratory: Bibasilar crackles  Gastrointestinal: Soft, non tender  Genitourinary: no suprapubic tenderness  Musculoskeletal: Trace bilateral pedal edema  Skin: warm, dry  Neuro: Alert.  Oriented x 3  Psych: Mood appropriate.       Past Medical History:   PMHx   Past Medical History:   Diagnosis Date    Anxiety     Back pain     Chronic    Bipolar 1 disorder (HCC) 2/13    Chest pain     Secondary to GERD    Chronic kidney disease     CKD (chronic kidney disease)     DDD (degenerative disc disease), lumbar 2013    + MRI - no narcotics from this office    Depression, endogenous ([facility])     suicide attempt 10/11, [facility]; psych-[facility]    Diabetes mellitus ([facility]) 1998    GERD (gastroesophageal reflux disease)     Hepatitis C 11/29/2017    + RNA by pcr    Hepatitis C antibody positive in blood 11/27/2017    AB +    Hyperlipidemia         Hypertension     Hypotestosteronism 1/12    Treatment not effective per Dr Mat    Marijuana use     Narcotic abuse (HCC)     Opiate abuse, continuous (HCC)     Proteinuria     Biopsy proven diabetic nephropathy    PTSD (post-traumatic stress disorder)     Vitamin D deficiency 6/25/2013     PSHX:  has a past surgical history that includes Appendectomy (1985); Knee arthroscopy; Vasectomy; Dental surgery (2001); back surgery (4/25/2013); Kidney biopsy; other surgical history (Left, 11/28/2017); eye surgery; and Retinal detachment surgery.  Allergies:   Allergies   Allergen

## 2024-02-24 NOTE — CONSULTS
Pharmacy to Manage Heparin Infusion per Hospital Nomogram    Dx: Chest Pain  Pt wt = 63.5 kg  Baseline aPTT = pending    Oral factor Xa-inhibitors may alter and elevate anti-Xa levels used for unfractionated heparin monitoring. As a result, anti-Xa monitoring is not accurate while Xa-inhibitor activity is detectable. Utilize aPTT monitoring when patient received an oral factor Xa-inhibitor (apixaban, betrixaban, edoxaban or rivaroxaban) within 72 hours prior to admission (please document last administration time). The goal is to allow a washout of oral factor Xa-inhibitors by using aPTT for 72 hours, then change to ant-Xa levels for UFH.     Heparin (weight-based) Infusion: CAD/STEMI/NSTEMI/UA/AFib)   Heparin 60 units/kg IVP bolus (max 4,000 units) followed by Heparin infusion at 12 units/kg/hr (recommended max initial rate: 1000 units/hr).  Recheck anti-Xa (unless aPTT being used) in 6 hours.  Goal anti-Xa 0.3-0.7 IU/mL  Goal aPTT =  seconds.    Pharmacy to manage Heparin - contact for questions.    Heparin 60 units/kg IV x 1 (max 4,000 units), then 12 units/kg/hr (recommended max initial rate 1,000 units/hr). Adjust infusion rate based off anti-Xa results below.     anti-Xa < 0.1    Heparin 60 units/kg bolus  Increase infusion by 4 units/kg/hr  anti-Xa 0.1-0.29 Heparin 30 units/kg bolus Increase infusion by 2 units/kg/hr  anti-Xa 0.3-0.7    No bolus No change   anti-Xa 0.71-0.8   No bolus Decrease infusion by 1 units/kg/hr  anti-Xa 0.81-0.99    No bolus Decrease infusion by 2 units/kg/hr  anti-Xa 1 or more     Hold heparin for 1 hour Decrease infusion by 3 units/kg/hr    Obtain anti-Xa 6 hours after bolus and 6 hours after any dose change until two consecutive therapeutic anti-Xa are achieved- then daily.    Edith Varela, PharmD  2/24/2024 at 11:27 AM    2/24 2031  Anti-Xa <0.10  Administer 3800 unit bolus and increase heparin infusion to 1010 units/hr  Next anti-Xa 2/25 0300  Chirag Lopez, BroD  9:04 PM EST 2/24/24 2/25/24 at 0256  Anti-Xa: 0.16  Plan: give 1900 units of heparin as bolus, increase heparin infusion rate to 1140 units/hr, recheck anti-Xa at 1000  Nestor Santos PharmD 2/25/2024  3:47 AM    2/25/2024  Recent Labs     02/25/24  1031   ANTIXAUHEP 0.13*   - Heparin _1900_ units IVP bolus and then increase Heparin infusion to _1270_ units/hr.   - Recheck Anti-Xa in 6 hours at 1730  Pranav Cabral PharmTOÑO    2/25/2024 11:28 AM     2/25 1720  Anti-Xa = 0.20  Below goal range, administer 1900 unit bolus and increase heparin infusion rate to 1400 units/hr  Next anti-Xa 2/26 0015  Chirag Lopez, PharmD 6:10 PM EST 2/25/24 2/26/24 at 0004  Anti-Xa: 0.28  Plan: give 1900 units of heparin as bolus, increase heparin infusion rate to 1530 units/hr, recheck anti-Xa at 0700  Nestor Santos PharmD 2/26/2024  12:54 AM    2/26 0826  Anti-Xa - 0.25  Give 1900 unit heparin IV bolus and increase heparin infusion to 1660 units/hr  Next anti-Xa at 1500  Sam López PharmD 2/26/2024 8:59 AM    2/26 1608  Anti-Xa - 0.50  Continue heparin infusion at 1660 units/hr  Next anti-Xa at 2200    Yahir Wilson PharmTOÑO 2/26/2024 4:31 PM    2/26 2230  Anti-Xa 0.27 at 2137  Heparin 1900  units IVP bolus then increase Heparin infusion to 1790  units/hr  Recheck anti-Xa in 6 hours @ 0430    Ciro Metcalf PharmTOÑO 2/26/2024 10:26 PM    2/27 @ 0653  Anti-Xa 0.57  Continue Heparin infusion at 1790  units/hr  Recheck anti-Xa in 6 hours @ 1300    Yahir Wilson PharmD 2/27/2024 7:59 AM    2/27 1534  Anti Xa 0.48 IU/mL  Continue with current heparin rate of 1790 units/hr  Daily Anti Xa ordered  Edith Varela, PharmD  2/27/2024 at 4:32 PM    2/28 @ 0640  Anti Xa 0.38 IU/mL  Continue with current heparin rate of 1790 units/hr  Daily Anti Xa ordered    Yahir Wilson PharmD 2/28/2024 7:59 AM

## 2024-02-24 NOTE — PROGRESS NOTES
Patient admitted to room 209 from ED.  Patient oriented to room, call light, bed rails, phone, lights and bathroom.  Patient instructed about the schedule of the day including: vital sign frequency, lab draws, possible tests, frequency of MD and staff rounds, including RN/MD rounding together at bedside, daily weights, and I &O's.  Patient instructed about prescribed diet,  and television.  Bed alarm in place, patient aware of placement and reason.  Telemetry box in place, patient aware of placement and reason.  Bed locked, in lowest position, side rails up 2/4, call light within reach.  Will continue to monitor.

## 2024-02-24 NOTE — PROGRESS NOTES
Interventions:   Food and/or Nutrient Delivery: Continue Current Diet, Start Oral Nutrition Supplement  Nutrition Education/Counseling: No recommendation at this time  Coordination of Nutrition Care: Continue to monitor while inpatient       Goals:     Goals: PO intake 50% or greater, prior to discharge       Nutrition Monitoring and Evaluation:   Behavioral-Environmental Outcomes: None Identified  Food/Nutrient Intake Outcomes: Food and Nutrient Intake, Supplement Intake  Physical Signs/Symptoms Outcomes: Biochemical Data, Weight    Discharge Planning:    Continue current diet, Continue Oral Nutrition Supplement     Roxanne Cleaning RD, LD  Contact: [phone]

## 2024-02-24 NOTE — ED NOTES
Mr. Wheeler is a 53 y.o. male who had concerns including Chest Pain (At dialysis today \"having dialysis cath cleaned out for some clots. Last treatment Monday. CP today. Nitro x 3 doses. ASA given. No IV. ) and Generalized Body Aches.    Chief Complaint   Patient presents with    Chest Pain     At dialysis today \"having dialysis cath cleaned out for some clots. Last treatment Monday. CP today. Nitro x 3 doses. ASA given. No IV.     Generalized Body Aches       He is being admitted for:    Chest pain    His ED problem list included:    No diagnosis found.    Past Medical History:   Diagnosis Date    Anxiety     Back pain     Chronic    Bipolar 1 disorder (HCC) 2/13    Chest pain     Secondary to GERD    Chronic kidney disease     CKD (chronic kidney disease)     DDD (degenerative disc disease), lumbar 2013    + MRI - no narcotics from this office    Depression, endogenous (HCC)     suicide attempt 10/11, [facility]; psych-[facility]    Diabetes mellitus (HCC) 1998    GERD (gastroesophageal reflux disease)     Hepatitis C 11/29/2017    + RNA by pcr    Hepatitis C antibody positive in blood 11/27/2017    AB +    Hyperlipidemia         Hypertension     Hypotestosteronism 1/12    Treatment not effective per Dr Rivero    Marijuana use     Narcotic abuse (HCC)     Opiate abuse, continuous (HCC)     Proteinuria     Biopsy proven diabetic nephropathy    PTSD (post-traumatic stress disorder)     Vitamin D deficiency 6/25/2013       Past Surgical History:   Procedure Laterality Date    APPENDECTOMY  1985    BACK SURGERY  4/25/2013    Hemilaminectomy L5S1 right/Disectomy of Herniated L5S1 right    DENTAL SURGERY  2001    oral surgey    EYE SURGERY      KIDNEY BIOPSY      KNEE ARTHROSCOPY      cyst removal    OTHER SURGICAL HISTORY Left 11/28/2017    left arm Irrigation and drainage    RETINAL DETACHMENT SURGERY      VASECTOMY         His recent abnormal labs were:    Labs Reviewed   CBC WITH AUTO DIFFERENTIAL - Abnormal;  Notable for the following components:       Result Value    RBC 3.63 (*)     Hemoglobin 10.7 (*)     Hematocrit 32.6 (*)     Lymphocytes Absolute 0.4 (*)     All other components within normal limits   COMPREHENSIVE METABOLIC PANEL W/ REFLEX TO MG FOR LOW K - Abnormal; Notable for the following components:    Potassium reflex Magnesium 5.4 (*)     CO2 20 (*)     Glucose 145 (*)     BUN 53 (*)     Creatinine 6.8 (*)     Est, Glom Filt Rate 9 (*)     ALT 7 (*)     AST 12 (*)     All other components within normal limits    Narrative:     CALL  Child  [facility] tel. [phone],  Chemistry results called to and read back by Colleen Coe RN, 02/23/2024  21:07, by CASSANDRA   TROPONIN - Abnormal; Notable for the following components:    Troponin, High Sensitivity 216 (*)     All other components within normal limits    Narrative:     CALL  Child  [facility] tel. [phone],  Chemistry results called to and read back by Colleen Coe RN, 02/23/2024  21:07, by CASSANDRA   COVID-19 & INFLUENZA COMBO   TROPONIN       His vital signs for the encounter were:    Patient Vitals for the past 24 hrs:   BP Temp Temp src Pulse Resp SpO2 Height Weight   02/23/24 2205 (!) 181/66 -- -- 81 -- -- -- --   02/23/24 2134 (!) 181/66 -- -- 89 22 94 % -- --   02/23/24 2106 (!) 171/53 -- -- 86 22 98 % -- --   02/23/24 2036 (!) 189/60 -- -- 85 26 97 % -- --   02/23/24 2017 (!) 200/73 99.2 °F (37.3 °C) Oral 79 16 97 % 1.778 m (5' 10\") 63.5 kg (140 lb)   02/23/24 2016 -- -- Oral -- -- -- -- --        He has the following lines:    Peripheral IV 02/23/24 Right Antecubital (Active)       He has received the following medications:    Medications   aspirin chewable tablet 324 mg (324 mg Oral Not Given 2/23/24 2205)   acetaminophen (TYLENOL) tablet 1,000 mg (1,000 mg Oral Given 2/23/24 2100)   nitroGLYCERIN (NITROSTAT) SL tablet 0.4 mg (0.4 mg SubLINGual Given 2/23/24 2205)       He had the following images with impressions:    XR CHEST PORTABLE    Result Date:

## 2024-02-24 NOTE — CONSULTS
Electrophysiology Consultation   Date: 2/24/2024  Admit Date:  2/23/2024  Reason for Consultation: Chest pain and nasal congestion.  Consult Requesting Physician: Heber Mendiola MD     Chief Complaint   Patient presents with    Chest Pain     At dialysis today \"having dialysis cath cleaned out for some clots. Last treatment Monday. CP today. Nitro x 3 doses. ASA given. No IV.     Generalized Body Aches     HPI:  Mr. Wheeler is a pleasant 53 year old male with a medical history significant for ischemic cardiomyopathy status post PCI (05/03/2023) with recovered LVEF, ESRD, diabetes mellitus type I, and history of substance abuse who presents from home with chest pain.  According the patient he was in his usual state of health until yesterday morning when began to suffer from chest pressure.  States his discomfort feels as if someone is sitting on his chest.  No radiation.  Patient states that the discomfort feels just as he did when he had a STEMI in 2023.  He therefore presented to [facility].  Patient also notes significant congestion.    Patient denies fevers, orthopnea, PND, lower extremity edema, abdominal swelling, shortness of breath, dyspnea on exertion, chills, visual changes, headaches, sore throat, cough, abdominal pain, nausea, vomiting, bleeding, bruising, dysuria, muscle/joint pain, confusion, depression, anxiety, skin lesions, etc.    Emergency Room/Hospital Course:  Patient was eval emergency room on 02/23/2023.  His CMP was notable for mildly elevated potassium and elevated creatinine.  His troponin enzymes were elevated but near his baseline at 200.  His CBC was notable for normocytic anemia.  His CT chest was notable for suspected esophagitis and tiny left pleural effusion.  His EKG was reassuring.  Cardiology was consulted.    Past Medical History:   Diagnosis Date    Anxiety     Back pain     Chronic    Bipolar 1 disorder (HCC) 2/13    Chest pain     Secondary to GERD    Chronic kidney

## 2024-02-24 NOTE — FLOWSHEET NOTE
Treatment time: 3 hours  Net UF: 3000 ml     Pre weight: 64  kg   Post weight: 63 kg  EDW: tbd kg     Access used: RAINA AVG  Access function: good with  ml/min     Medications or blood products given: n/a     Regular outpatient schedule: MWF     Summary of response to treatment: HD tx completed in full, VSS, pt alert no distress, tolerated tx well w/o complications, 10min per sites held, hemostasis achieved      Copy of dialysis treatment record placed in chart, to be scanned into EMR.       02/24/24 1425 02/24/24 1434 02/24/24 1735   Vital Signs   BP (!) 157/63  --  (!) 149/63   Temp 98.8 °F (37.1 °C)  --  98.5 °F (36.9 °C)   Pulse 82  --  90   Respirations 18 18 18   Weight - Scale 64 kg (141 lb 1.5 oz)  --  61 kg (134 lb 7.7 oz)   Weight Method Bed scale  --  Bed scale   Percent Weight Change 0.78  --  -4.69

## 2024-02-24 NOTE — PROGRESS NOTES
Hospital Medicine Progress Note      Date of Admission: 2/23/2024  Hospital Day: 2    Chief Admission Complaint:  \"Chest pain and URTI\"     Subjective:  no new c/o    Presenting Admission History:         \"Heber Wheeler is a 53 y.o. male with hypertension, hyperlipidemia, ESRD on HD MWF, bipolar type I, T2DM, chronic hepatitis C, chronic back pain, marijuana abuse presented from home with chest pain and flulike symptoms.  Patient reports that he had a problem with his graft on Wednesday and was told to go to Access center, patient went to Access center and underwent some clearing up of clots, by the time he went back to HD they did not have a chair for him.  Thursday started having nasal/sinus congestion headache and nonproductive cough.  This morning started experiencing substernal chest discomfort, describes it as constant sharp nonradiating no aggravating or alleviating factors associated with mild shortness of breath.  During my evaluation patient was alert oriented x 3 hemodynamically stable, recently administered IV Dilaudid and reported no chest pain.  Denies fevers chills night sweats nausea vomiting diarrhea LOC dizziness\"     Assessment/Plan:      Current Principal Problem:  Chest pain       Atypical chest pain, rule out ACS  Likely secondary to fluid overload due to missed HD and uncontrolled hypertension  Troponin 216> 199 (has chronically elevated troponin trend)  EKG personally reviewed, NSR 80/min, nonspecific ST-T wave abnormalities in lateral leads  Aspirin load en route, continue aspirin, Plavix and Lipitor  Continue home Imdur and Coreg  Keep n.p.o. for cardiology evaluation in a.m.     Uncontrolled hypertension  Resume home carvedilol Imdur Cardura and hydralazine  Needs inpatient HD in a.m, consulted nephrology     ESRD on HD MWF  Hyperkalemia, K+ 5.4  Fluid overload secondary to missed HD  Unable to perform HD on Wednesday due to problem with graft.  Chest x-ray with cardiomegaly and  interpreted for clinical significance   [x] Appropriate follow-up labs were ordered    Medications:  Personally reviewed in detail in conjunction w/ labs as documented for evidence of drug toxicity.     Infusion Medications    sodium chloride       Scheduled Medications    carvedilol  12.5 mg Oral BID WC    clopidogrel  75 mg Oral Daily    doxazosin  2 mg Oral Nightly    ezetimibe  10 mg Oral Daily    hydrALAZINE  100 mg Oral TID    isosorbide mononitrate  30 mg Oral Daily    pantoprazole  40 mg Oral QAM AC    [Held by provider] sacubitril-valsartan  1 tablet Oral BID    sevelamer  800 mg Oral TID WC    heparin (porcine)  5,000 Units SubCUTAneous 3 times per day    fluticasone  1 spray Each Nostril Daily    sodium chloride flush  5-40 mL IntraVENous 2 times per day    aspirin  81 mg Oral Daily    atorvastatin  40 mg Oral Nightly    QUEtiapine  200 mg Oral Nightly     PRN Meds: nitroGLYCERIN, sodium chloride flush, sodium chloride, ondansetron **OR** ondansetron, acetaminophen **OR** acetaminophen, benzonatate     Labs:  Personally reviewed and interpreted for clinical significance.     Recent Labs     02/23/24 2033 02/24/24  0453   WBC 8.3 8.0   HGB 10.7* 9.7*   HCT 32.6* 29.3*    231     Recent Labs     02/23/24 2033 02/24/24  0453    135*   K 5.4* 5.3*    103   CO2 20* 16*   BUN 53* 59*   CREATININE 6.8* 6.8*   CALCIUM 9.2 8.7     Recent Labs     02/23/24 2033 02/23/24  2208   TROPHS 216* 199*     No results for input(s): \"LABA1C\" in the last 72 hours.  Recent Labs     02/23/24 2033   AST 12*   ALT 7*   BILITOT <0.2   ALKPHOS 55     No results for input(s): \"INR\", \"LACTA\", \"TSH\" in the last 72 hours.    Urine Cultures:   Lab Results   Component Value Date/Time    LABURIN No growth at 18-36 hours 06/19/2018 04:15 PM    LABURIN 50 06/06/2013 01:49 PM     Blood Cultures:   Lab Results   Component Value Date/Time    BC No Growth after 4 days of incubation. 12/03/2023 04:55 PM     Lab Results

## 2024-02-24 NOTE — CONSULTS
Food Insecurity: No Food Insecurity (2/23/2024)    Hunger Vital Sign     Worried About Running Out of Food in the Last Year: Never true     Ran Out of Food in the Last Year: Never true   Transportation Needs: No Transportation Needs (2/23/2024)    PRAPARE - Transportation     Lack of Transportation (Medical): No     Lack of Transportation (Non-Medical): No   Housing Stability: Low Risk  (2/23/2024)    Housing Stability Vital Sign     Unable to Pay for Housing in the Last Year: No     Number of Places Lived in the Last Year: 1     Unstable Housing in the Last Year: No       Physical Exam     Blood pressure (!) 169/59, pulse 88, temperature 98.8 °F (37.1 °C), temperature source Oral, resp. rate 22, height 1.778 m (5' 10\"), weight 63.5 kg (140 lb), SpO2 96 %.    General:  NAD, A+Ox3, ill-appearing  HEENT:  PERRL, EOMI  Neck:  Supple, normal range of movement  Chest:  Room air, mild distress, no wheezing   CV:  Regular, no rub   Abdomen:  NTND, soft, +BS, no hepatosplenomegaly  Extremities:  No peripheral edema  Neurological:  Moving all four extremities, CN II-XII grossly intact  Lymphatics:  No palpable lymph nodes  Skin:  No rash, no jaundice  Psychiatric:  Normal insight and judgement, good recall  Access:  Left upper arm curved graft with + thrill     Data     Recent Labs     02/23/24 2033 02/24/24  0453 02/24/24  1129   WBC 8.3 8.0 13.2*   HGB 10.7* 9.7* 9.8*   HCT 32.6* 29.3* 30.4*   MCV 90.0 90.6 90.4    231 236     Recent Labs     02/23/24 2033 02/24/24  0453    135*   K 5.4* 5.3*    103   CO2 20* 16*   GLUCOSE 145* 120*   BUN 53* 59*   CREATININE 6.8* 6.8*   LABGLOM 9* 9*       Assessment:    ESRD:  On HD mwf at [facility].  He has a working AV graft, he was declotted on Friday.  He missed due to graft thrombosis and his last dialysis was Monday ( 2/19 )  Shortness of breath:  Pulmonary edema due to missed dialysis  Hyperkalemia:  Due to miss HD  Metabolic Acidosis:  Due to  ESRD  Hypertension:  Range is elevated in setting of missing dialysis all week and volume overload       Plan:    HD today  UF 3-4 kg as tolerated   2K bath  HD again on Monday and then will get back to regular schedule     Thank you for asking us to participate in the management of your patient, please do not hesitate to contact me for any concerns regarding my recommendations as outlined above.    -----------------------------  Ekaterina Tinoco M.D.   [facility]

## 2024-02-24 NOTE — ED PROVIDER NOTES
Attending Supervisory Note/Shared Visit       I personally saw the patient and made/approved the management plan and take responsibility for the patient management.      History: 53-year-old male presents to the emergency department for chest pain.  Described as pressure in the center of his chest.  Exam: No respiratory distress.  Palpable thrill in the left upper extremity AV fistula.  No reproducibility on palpation of the chest.  Diminished breath sounds bilateral bases.  MDM:   53-year-old man with concerning story for chest pain, missed dialysis session, EKG changes showing lateral ST changes compared to the prior EKG.  Troponin chronically elevated but given the EKG changes, requires admission for further workup.  Coordinated care with the hospitalist, gave aspirin, nitro.  Admitted.  Went up in stable condition.      I, Dr. Childress, am the primary clinician of record on the case.  I otherwise agree with the documentation performed by the resident/ANNEL.    FINAL IMPRESSION      1. Chest pain, unspecified type    2. ST segment changes on electrocardiogram          DISPOSITION/PLAN   DISPOSITION Admitted 02/23/2024 10:02:38 PM        Thee Childress MD  Attending Emergency Physician        Thee Childress MD  02/25/24 4101

## 2024-02-25 LAB
ALBUMIN SERPL-MCNC: 3.8 G/DL (ref 3.4–5)
ANION GAP SERPL CALCULATED.3IONS-SCNC: 17 MMOL/L (ref 3–16)
ANTI-XA UNFRAC HEPARIN: 0.13 IU/ML (ref 0.3–0.7)
ANTI-XA UNFRAC HEPARIN: 0.16 IU/ML (ref 0.3–0.7)
ANTI-XA UNFRAC HEPARIN: 0.2 IU/ML (ref 0.3–0.7)
BUN SERPL-MCNC: 45 MG/DL (ref 7–20)
CALCIUM SERPL-MCNC: 8.9 MG/DL (ref 8.3–10.6)
CHLORIDE SERPL-SCNC: 93 MMOL/L (ref 99–110)
CO2 SERPL-SCNC: 22 MMOL/L (ref 21–32)
CREAT SERPL-MCNC: 5.6 MG/DL (ref 0.9–1.3)
DEPRECATED RDW RBC AUTO: 14.8 % (ref 12.4–15.4)
EST. AVERAGE GLUCOSE BLD GHB EST-MCNC: 102.5 MG/DL
GFR SERPLBLD CREATININE-BSD FMLA CKD-EPI: 12 ML/MIN/{1.73_M2}
GLUCOSE SERPL-MCNC: 170 MG/DL (ref 70–99)
HBA1C MFR BLD: 5.2 %
HCT VFR BLD AUTO: 31.4 % (ref 40.5–52.5)
HGB BLD-MCNC: 10.3 G/DL (ref 13.5–17.5)
MCH RBC QN AUTO: 29 PG (ref 26–34)
MCHC RBC AUTO-ENTMCNC: 32.8 G/DL (ref 31–36)
MCV RBC AUTO: 88.4 FL (ref 80–100)
PHOSPHATE SERPL-MCNC: 7.3 MG/DL (ref 2.5–4.9)
PLATELET # BLD AUTO: 248 K/UL (ref 135–450)
PMV BLD AUTO: 8.4 FL (ref 5–10.5)
POTASSIUM SERPL-SCNC: 4.8 MMOL/L (ref 3.5–5.1)
RBC # BLD AUTO: 3.55 M/UL (ref 4.2–5.9)
SODIUM SERPL-SCNC: 132 MMOL/L (ref 136–145)
WBC # BLD AUTO: 14.8 K/UL (ref 4–11)

## 2024-02-25 PROCEDURE — 6370000000 HC RX 637 (ALT 250 FOR IP): Performed by: NURSE PRACTITIONER

## 2024-02-25 PROCEDURE — 94761 N-INVAS EAR/PLS OXIMETRY MLT: CPT

## 2024-02-25 PROCEDURE — 6370000000 HC RX 637 (ALT 250 FOR IP): Performed by: STUDENT IN AN ORGANIZED HEALTH CARE EDUCATION/TRAINING PROGRAM

## 2024-02-25 PROCEDURE — 99233 SBSQ HOSP IP/OBS HIGH 50: CPT | Performed by: NURSE PRACTITIONER

## 2024-02-25 PROCEDURE — 1200000000 HC SEMI PRIVATE

## 2024-02-25 PROCEDURE — 36415 COLL VENOUS BLD VENIPUNCTURE: CPT

## 2024-02-25 PROCEDURE — G0378 HOSPITAL OBSERVATION PER HR: HCPCS

## 2024-02-25 PROCEDURE — 6360000002 HC RX W HCPCS: Performed by: INTERNAL MEDICINE

## 2024-02-25 PROCEDURE — 80069 RENAL FUNCTION PANEL: CPT

## 2024-02-25 PROCEDURE — 85520 HEPARIN ASSAY: CPT

## 2024-02-25 PROCEDURE — 2700000000 HC OXYGEN THERAPY PER DAY

## 2024-02-25 PROCEDURE — 6370000000 HC RX 637 (ALT 250 FOR IP): Performed by: INTERNAL MEDICINE

## 2024-02-25 PROCEDURE — 85027 COMPLETE CBC AUTOMATED: CPT

## 2024-02-25 PROCEDURE — 6360000002 HC RX W HCPCS: Performed by: STUDENT IN AN ORGANIZED HEALTH CARE EDUCATION/TRAINING PROGRAM

## 2024-02-25 PROCEDURE — 2580000003 HC RX 258: Performed by: STUDENT IN AN ORGANIZED HEALTH CARE EDUCATION/TRAINING PROGRAM

## 2024-02-25 RX ORDER — HEPARIN SODIUM 1000 [USP'U]/ML
1900 INJECTION, SOLUTION INTRAVENOUS; SUBCUTANEOUS ONCE
Status: COMPLETED | OUTPATIENT
Start: 2024-02-25 | End: 2024-02-25

## 2024-02-25 RX ADMIN — ISOSORBIDE MONONITRATE 30 MG: 30 TABLET, EXTENDED RELEASE ORAL at 08:28

## 2024-02-25 RX ADMIN — SEVELAMER CARBONATE 800 MG: 800 TABLET, FILM COATED ORAL at 08:28

## 2024-02-25 RX ADMIN — HEPARIN SODIUM 1900 UNITS: 1000 INJECTION INTRAVENOUS; SUBCUTANEOUS at 11:57

## 2024-02-25 RX ADMIN — SEVELAMER CARBONATE 800 MG: 800 TABLET, FILM COATED ORAL at 16:20

## 2024-02-25 RX ADMIN — HYDROMORPHONE HYDROCHLORIDE 0.5 MG: 1 INJECTION, SOLUTION INTRAMUSCULAR; INTRAVENOUS; SUBCUTANEOUS at 11:49

## 2024-02-25 RX ADMIN — HYDRALAZINE HYDROCHLORIDE 100 MG: 50 TABLET ORAL at 16:20

## 2024-02-25 RX ADMIN — HYDRALAZINE HYDROCHLORIDE 100 MG: 50 TABLET ORAL at 08:28

## 2024-02-25 RX ADMIN — HEPARIN SODIUM 1900 UNITS: 1000 INJECTION INTRAVENOUS; SUBCUTANEOUS at 05:31

## 2024-02-25 RX ADMIN — CLOPIDOGREL BISULFATE 75 MG: 75 TABLET ORAL at 08:28

## 2024-02-25 RX ADMIN — PANTOPRAZOLE SODIUM 40 MG: 40 TABLET, DELAYED RELEASE ORAL at 05:34

## 2024-02-25 RX ADMIN — DOXAZOSIN 2 MG: 2 TABLET ORAL at 20:49

## 2024-02-25 RX ADMIN — CARVEDILOL 25 MG: 25 TABLET, FILM COATED ORAL at 16:20

## 2024-02-25 RX ADMIN — QUETIAPINE FUMARATE 200 MG: 100 TABLET ORAL at 20:49

## 2024-02-25 RX ADMIN — CARVEDILOL 25 MG: 25 TABLET, FILM COATED ORAL at 08:28

## 2024-02-25 RX ADMIN — ONDANSETRON 4 MG: 2 INJECTION INTRAMUSCULAR; INTRAVENOUS at 18:34

## 2024-02-25 RX ADMIN — HYDROMORPHONE HYDROCHLORIDE 0.5 MG: 1 INJECTION, SOLUTION INTRAMUSCULAR; INTRAVENOUS; SUBCUTANEOUS at 07:22

## 2024-02-25 RX ADMIN — HYDROMORPHONE HYDROCHLORIDE 0.5 MG: 1 INJECTION, SOLUTION INTRAMUSCULAR; INTRAVENOUS; SUBCUTANEOUS at 03:03

## 2024-02-25 RX ADMIN — ATORVASTATIN CALCIUM 40 MG: 40 TABLET, FILM COATED ORAL at 20:53

## 2024-02-25 RX ADMIN — EZETIMIBE 10 MG: 10 TABLET ORAL at 08:28

## 2024-02-25 RX ADMIN — HYDRALAZINE HYDROCHLORIDE 100 MG: 50 TABLET ORAL at 20:49

## 2024-02-25 RX ADMIN — ASPIRIN 81 MG 81 MG: 81 TABLET ORAL at 08:28

## 2024-02-25 RX ADMIN — HEPARIN SODIUM 1900 UNITS: 1000 INJECTION INTRAVENOUS; SUBCUTANEOUS at 18:28

## 2024-02-25 RX ADMIN — HYDROMORPHONE HYDROCHLORIDE 0.5 MG: 1 INJECTION, SOLUTION INTRAMUSCULAR; INTRAVENOUS; SUBCUTANEOUS at 16:20

## 2024-02-25 RX ADMIN — FLUTICASONE PROPIONATE 1 SPRAY: 50 SPRAY, METERED NASAL at 08:29

## 2024-02-25 RX ADMIN — Medication 10 ML: at 20:49

## 2024-02-25 RX ADMIN — BENZONATATE 100 MG: 100 CAPSULE ORAL at 03:09

## 2024-02-25 RX ADMIN — HYDROMORPHONE HYDROCHLORIDE 0.5 MG: 1 INJECTION, SOLUTION INTRAMUSCULAR; INTRAVENOUS; SUBCUTANEOUS at 20:49

## 2024-02-25 RX ADMIN — SEVELAMER CARBONATE 800 MG: 800 TABLET, FILM COATED ORAL at 11:48

## 2024-02-25 ASSESSMENT — PAIN DESCRIPTION - LOCATION
LOCATION: CHEST
LOCATION: CHEST

## 2024-02-25 ASSESSMENT — PAIN DESCRIPTION - ORIENTATION: ORIENTATION: MID

## 2024-02-25 ASSESSMENT — PAIN SCALES - GENERAL
PAINLEVEL_OUTOF10: 8

## 2024-02-25 ASSESSMENT — PAIN DESCRIPTION - DESCRIPTORS
DESCRIPTORS: SHARP;SHOOTING
DESCRIPTORS: ACHING

## 2024-02-25 NOTE — PROGRESS NOTES
02/24/24  1129   INR 1.24*       Urine Cultures:   Lab Results   Component Value Date/Time    LABURIN No growth at 18-36 hours 06/19/2018 04:15 PM    LABURIN 50 06/06/2013 01:49 PM     Blood Cultures:   Lab Results   Component Value Date/Time    BC No Growth after 4 days of incubation. 12/03/2023 04:55 PM     Lab Results   Component Value Date/Time    BLOODCULT2 No Growth after 4 days of incubation. 12/03/2023 05:21 PM     Organism:   Lab Results   Component Value Date/Time    ORG Prevotella buccae 11/28/2017 11:10 AM    ORG Prevotella oralis 11/28/2017 11:10 AM         Heber Mendiola MD

## 2024-02-25 NOTE — PROGRESS NOTES
Progress Note    HISTORY     CC:   Chest pain / shortness of breath              We are following for ESRD      Subjective/   HPI:  HD yesterday.  Graft worked and UF of 3 kg.  Felt better after HD but now more short of breath today and now on supplemental oxygen     ROS:  Constitutional:  No fevers, No Chills, + weakness  Cardiovascular:  No palpations, no edema  Respiratory:  No wheezing, no cough  Skin:  No rash, no itching  :  No hematuria, not making much urine     Social Hx:  No Family at the bedside     Past Medical and Surgical History:  - Reviewed, no changes     EXAM       Objective/     Vitals:    02/25/24 0722 02/25/24 0818 02/25/24 1206 02/25/24 1606   BP:  (!) 159/54 (!) 129/54 (!) 157/54   Pulse:  92 74 80   Resp: 18 20 16 20   Temp:  98.1 °F (36.7 °C) 98.2 °F (36.8 °C) 98.8 °F (37.1 °C)   TempSrc:  Axillary Oral Axillary   SpO2:  (!) 89% 92% 92%   Weight:       Height:         24HR INTAKE/OUTPUT:    Intake/Output Summary (Last 24 hours) at 2/25/2024 1642  Last data filed at 2/25/2024 0954  Gross per 24 hour   Intake 1260 ml   Output 3880 ml   Net -2620 ml     Constitutional:  Ill appearing   Eyes:  Pupils reactive, sclera clear   Neck:  Normal thyroid, no masses   Cardiovascular:  Regular, no rub  Respiratory:  No distress, no wheezing  Psychiatry:  Appropriate mood/affect, alert  Abdomen: +bs, soft, nt, no masses   Musculoskeletal: No LE edema, no clubbing   Lymphatics:  No LAD in neck, no supraclavicular nodes   Access:  Left upper arm graft with + thrill       MEDICAL DECISION MAKING       Data/  Recent Labs     02/24/24  0453 02/24/24  1129 02/25/24  0559   WBC 8.0 13.2* 14.8*   HGB 9.7* 9.8* 10.3*   HCT 29.3* 30.4* 31.4*   MCV 90.6 90.4 88.4    236 248     Recent Labs     02/23/24  2033 02/24/24  0453 02/25/24  0559    135* 132*   K 5.4* 5.3* 4.8    103 93*   CO2 20* 16* 22   GLUCOSE 145* 120* 170*   PHOS  --   --  7.3*   BUN 53* 59* 45*   CREATININE 6.8* 6.8*  5.6*   LABGLOM 9* 9* 12*       Assessment/     ESRD:  On HD mwf at [facility].  He has a working AV graft, he was declotted on Friday.  He missed due to graft thrombosis and his last dialysis was Monday ( 2/19 ) prior to admission.  HD yesterday   Shortness of breath:  Pulmonary edema due to missed dialysis  Hyperkalemia:  Due to miss HD  Metabolic Acidosis:  Due to ESRD  Hypertension:  Range is elevated in setting of missing dialysis all week and volume overload.  Better post dialysis  Chest Pain:  Cardiology following.  Had anterior STEMI in 5/2023 and had MARVA placed to LAD, ENEIDA, and RCA.  Considering repeat angiogram        Plan/     HD tomorrow  UF 3 kg as tolerated  Follow labs  Ok for Entresto - his hyperkalemia is largely due to missed dialysis and if he is on regular treatments I do no think Entresto will be any problem       Thank you for asking us to participate in the management of your patient, please do not hesitate to contact me for any concerns regarding my recommendations as outlined above.    -----------------------------  Ekaterina Tinoco M.D.   [facility]

## 2024-02-25 NOTE — PROGRESS NOTES
[facility]  Cardiology  Progress Note    Admission date:  2024    Reason for follow up visit: Elevated troponin, CAD    HPI/CC: Heber Wheeler is a 53 y.o. male who presented 2024 for chest pain.  Troponin 216, 199 (ESRD-prior 229).  Planning for interventional cardiology evaluation 2024.  Rhythm overnight has been sinus.    Subjective: Ongoing chest pain, minimal improvement with nitro, only improves with pain medicine then comes right back.  Seems to intensify with coughing.    Vitals:  Blood pressure (!) 129/54, pulse 74, temperature 98.2 °F (36.8 °C), temperature source Oral, resp. rate 16, height 1.778 m (5' 10\"), weight 58.3 kg (128 lb 8 oz), SpO2 92 %.  Temp  Av °F (37.2 °C)  Min: 98.1 °F (36.7 °C)  Max: 101.7 °F (38.7 °C)  Pulse  Av.9  Min: 74  Max: 94  BP  Min: 115/46  Max: 177/57  SpO2  Av.9 %  Min: 89 %  Max: 95 %    24 hour I/O    Intake/Output Summary (Last 24 hours) at 2024 1314  Last data filed at 2024 0954  Gross per 24 hour   Intake 1260 ml   Output 3880 ml   Net -2620 ml     Current Facility-Administered Medications   Medication Dose Route Frequency Provider Last Rate Last Admin    clopidogrel (PLAVIX) tablet 75 mg  75 mg Oral Daily Jr Guillen MD   75 mg at 24    doxazosin (CARDURA) tablet 2 mg  2 mg Oral Nightly Jr Guillen MD   2 mg at 24    ezetimibe (ZETIA) tablet 10 mg  10 mg Oral Daily Jr Guillen MD   10 mg at 24    hydrALAZINE (APRESOLINE) tablet 100 mg  100 mg Oral TID Jr Guillen MD   100 mg at 24    isosorbide mononitrate (IMDUR) extended release tablet 30 mg  30 mg Oral Daily Jr Guillen MD   30 mg at 24    pantoprazole (PROTONIX) tablet 40 mg  40 mg Oral [facility] Jr Guillen MD   40 mg at 24 0534    [Held by provider] sacubitril-valsartan (ENTRESTO) 24-26 MG per tablet 1 tablet  1 tablet Oral BID Jr Guillen MD        sevelamer      PT/ INR   Lab Results   Component Value Date/Time    INR 1.24 02/24/2024 11:29 AM    INR 1.01 12/03/2023 04:55 PM    INR 0.84 07/27/2014 03:42 PM    PROTIME 15.6 02/24/2024 11:29 AM    PROTIME 13.3 12/03/2023 04:55 PM    PROTIME 9.5 07/27/2014 03:42 PM     PTT No results found for: \"PTT\"   Lab Results   Component Value Date/Time    MG 1.70 06/14/2020 06:59 AM      Lab Results   Component Value Date/Time    TSH 1.58 06/12/2020 01:39 PM     All labs and imaging reviewed today    Assessment:  Elevated troponin  CAD: No ischemia, + scar on stress test 8/2023; s/p MARVA LAD and OM1 with later staged MARVA RCA in the setting of anterior STEMI 5/2023  Ischemic cardiomyopathy: EF improved to 60-65% on echo 12/2023 from 40-45% in 5/2023  HFmrEF: Appears compensated  HTN  HLD  ESRD  DM    Plan:   1.  Interventional cardiology evaluation on 2/26/2024 to determine ischemic evaluation  2.  Continue aspirin, statin, carvedilol, Plavix, zetia, Imdur  3.  Holding Entresto due to hyperkalemia, plan to resume when okay with nephrology  4.  IV heparin  5.  NPO at midnight manish Ozuna APRN-CNP  [facility]  [phone]     Ongoing chest pain with history of known severe CAD increases risk of morbidity and mortality necessitating treatment

## 2024-02-25 NOTE — PLAN OF CARE
Problem: Discharge Planning  Goal: Discharge to home or other facility with appropriate resources  2/25/2024 1010 by Alessandro Lomeli, RN  Outcome: Progressing     Problem: Pain  Goal: Verbalizes/displays adequate comfort level or baseline comfort level  2/25/2024 1010 by Alessandro Lomeli, RN  Outcome: Progressing     Problem: Safety - Adult  Goal: Free from fall injury  2/25/2024 1010 by Alessandro Lomeli, RN  Outcome: Progressing     Problem: Nutrition Deficit:  Goal: Optimize nutritional status  2/25/2024 1010 by Alessandro Lomeli, RN  Outcome: Progressing      Visit Time    Visit Start Time: 9454  Visit Stop Time: 2304  Total Visit Duration: 60 minutes    Subjective:     Patient ID: Nikia Baca is a 23 y o  female  [facility] Clinical Progress Notes      Specialized Services Documentation  Therapist must complete separate progress note for each specific clinical activity in which the individual participated during the day  Group Psychotherapy - Window of Tolerance   This group was facilitated virtually in a private office using HIPAA compliant and approved [employer] teams  Today group members were focused on understanding windows of tolerance  As a group, members examined their fight flight, freeze, and face reactions to stressors  Each member also evaluated their hyperarousal vs  hypoarousal responses  This group focused on the process of dysregulation and methods to begin regulating emotions to stay in their window of tolerance  We also reviewed methods to widen the window of tolerance  Members were encouraged to join in on the group discussion, take notes, and set intentions to practice the management and regulation techniques  Nikia Baca attended group on windows of tolerance  Nikia Baca made some efforts towards progress goals which were displayed through participation and engagement in topic  Sid Ly participated in the group discussion once and was not observed to have her camera on throughout group     Tx Plan Objective:1 1,1 2,1 4  Osman Vazquez

## 2024-02-25 NOTE — PROGRESS NOTES
A&Ox4. No complaints of /SOB. With occasional chest pain. Febrile, refuses oral meds for now because of hiccups. No use of accessory muscles when breathing.

## 2024-02-25 NOTE — ED PROVIDER NOTES
[facility] CARD TELEMETRY  EMERGENCY DEPARTMENT ENCOUNTER        Pt Name: Heber Wheeler  MRN: [id number]  Birthdate 1970  Date of evaluation: 2/23/2024  Provider: KARELY Quintero  PCP: Carline Pugh  Note Started: 8:26 PM EST        I have seen and evaluated this patient with my supervising physician MD Monroe.    CHIEF COMPLAINT       Chief Complaint   Patient presents with    Chest Pain     At dialysis today \"having dialysis cath cleaned out for some clots. Last treatment Monday. CP today. Nitro x 3 doses. ASA given. No IV.     Generalized Body Aches       HISTORY OF PRESENT ILLNESS      Chief Complaint: Chest pain, body aches    Heber Wheeler is a 53 y.o. male who presents to the emergency room for evaluation of chest pain and bodyaches.  Started today, nonexertional nonpleuritic.    Does dialysis, last dialysis was Monday.  Tried to go Wednesday but his fistula was clotted.  Supposed to go today but he was feeling ill.    SCREENINGS    Darden Coma Scale  Eye Opening: Spontaneous  Best Verbal Response: Oriented  Best Motor Response: Obeys commands  Darden Coma Scale Score: 15      Is this patient to be included in the SEP-1 Core Measure due to severe sepsis or septic shock?   No   Exclusion criteria - the patient is NOT to be included for SEP-1 Core Measure due to:  Infection is not suspected      PHYSICAL EXAM     Vitals: BP (!) 119/41   Pulse 79   Temp 100.2 °F (37.9 °C) (Oral)   Resp 17   Ht 1.778 m (5' 10\")   Wt 61 kg (134 lb 7.7 oz)   SpO2 92%   BMI 19.30 kg/m²    General: awake, alert, no apparent distress  Pupils: equal, reactive  Head: Non-traumatic  Heart: Rate as noted, regular rhythm, no murmur or rubs.  Chest/Lungs: CTAB, no wheezes or crackles  Abdomen: soft, nondistended, no tenderness to palpation   Extremities:  cap refill <2 UE/LE, no tenderness of calves, no edema.  Patent fistula left upper extremity  Neuro: no facial droop, no slurred speech, answers questions  appropriately.   Skin: Warm. No visible rash, lesions, or bruising       DIAGNOSTIC RESULTS   LABS:    Labs Reviewed   CBC WITH AUTO DIFFERENTIAL - Abnormal; Notable for the following components:       Result Value    RBC 3.63 (*)     Hemoglobin 10.7 (*)     Hematocrit 32.6 (*)     Lymphocytes Absolute 0.4 (*)     All other components within normal limits   COMPREHENSIVE METABOLIC PANEL W/ REFLEX TO MG FOR LOW K - Abnormal; Notable for the following components:    Potassium reflex Magnesium 5.4 (*)     CO2 20 (*)     Glucose 145 (*)     BUN 53 (*)     Creatinine 6.8 (*)     Est, Glom Filt Rate 9 (*)     ALT 7 (*)     AST 12 (*)     All other components within normal limits    Narrative:     CALL  Child  [facility] tel. [phone],  Chemistry results called to and read back by Colleen Coe RN, 02/23/2024  21:07, by CASSANDRA   TROPONIN - Abnormal; Notable for the following components:    Troponin, High Sensitivity 216 (*)     All other components within normal limits    Narrative:     [phone],  Chemistry results called to and read back by Colleen Coe RN, 02/23/2024  21:07, by CASSANDRA   TROPONIN - Abnormal; Notable for the following components:    Troponin, High Sensitivity 199 (*)     All other components within normal limits   CBC - Abnormal; Notable for the following components:    RBC 3.24 (*)     Hemoglobin 9.7 (*)     Hematocrit 29.3 (*)     All other components within normal limits   BASIC METABOLIC PANEL W/ REFLEX TO MG FOR LOW K - Abnormal; Notable for the following components:    Sodium 135 (*)     Potassium reflex Magnesium 5.3 (*)     CO2 16 (*)     Glucose 120 (*)     BUN 59 (*)     Creatinine 6.8 (*)     Est, Glom Filt Rate 9 (*)     All other components within normal limits    Narrative:     CALL  Child  [facility] tel. [phone],  Previous panic on this admission - call not needed per SOP, 02/24/2024 05:44,  by CASSANDRA   LIPID PANEL - Abnormal; Notable for the following components:    HDL 25 (*)

## 2024-02-26 LAB
ALBUMIN SERPL-MCNC: 3.5 G/DL (ref 3.4–5)
ANION GAP SERPL CALCULATED.3IONS-SCNC: 19 MMOL/L (ref 3–16)
ANTI-XA UNFRAC HEPARIN: 0.25 IU/ML (ref 0.3–0.7)
ANTI-XA UNFRAC HEPARIN: 0.27 IU/ML (ref 0.3–0.7)
ANTI-XA UNFRAC HEPARIN: 0.28 IU/ML (ref 0.3–0.7)
ANTI-XA UNFRAC HEPARIN: 0.5 IU/ML (ref 0.3–0.7)
BUN SERPL-MCNC: 69 MG/DL (ref 7–20)
CALCIUM SERPL-MCNC: 9 MG/DL (ref 8.3–10.6)
CHLORIDE SERPL-SCNC: 89 MMOL/L (ref 99–110)
CO2 SERPL-SCNC: 21 MMOL/L (ref 21–32)
CREAT SERPL-MCNC: 7.8 MG/DL (ref 0.9–1.3)
DEPRECATED RDW RBC AUTO: 14.5 % (ref 12.4–15.4)
GFR SERPLBLD CREATININE-BSD FMLA CKD-EPI: 8 ML/MIN/{1.73_M2}
GLUCOSE SERPL-MCNC: 129 MG/DL (ref 70–99)
HBV SURFACE AB SERPL IA-ACNC: <3.5 MIU/ML
HBV SURFACE AG SERPL QL IA: NORMAL
HCT VFR BLD AUTO: 30.2 % (ref 40.5–52.5)
HGB BLD-MCNC: 9.8 G/DL (ref 13.5–17.5)
MCH RBC QN AUTO: 28.6 PG (ref 26–34)
MCHC RBC AUTO-ENTMCNC: 32.6 G/DL (ref 31–36)
MCV RBC AUTO: 87.7 FL (ref 80–100)
PHOSPHATE SERPL-MCNC: 8 MG/DL (ref 2.5–4.9)
PLATELET # BLD AUTO: 229 K/UL (ref 135–450)
PMV BLD AUTO: 8.5 FL (ref 5–10.5)
POTASSIUM SERPL-SCNC: 5.2 MMOL/L (ref 3.5–5.1)
RBC # BLD AUTO: 3.44 M/UL (ref 4.2–5.9)
SODIUM SERPL-SCNC: 129 MMOL/L (ref 136–145)
WBC # BLD AUTO: 12 K/UL (ref 4–11)

## 2024-02-26 PROCEDURE — 6360000002 HC RX W HCPCS: Performed by: INTERNAL MEDICINE

## 2024-02-26 PROCEDURE — 6360000002 HC RX W HCPCS: Performed by: STUDENT IN AN ORGANIZED HEALTH CARE EDUCATION/TRAINING PROGRAM

## 2024-02-26 PROCEDURE — 85027 COMPLETE CBC AUTOMATED: CPT

## 2024-02-26 PROCEDURE — 2580000003 HC RX 258: Performed by: STUDENT IN AN ORGANIZED HEALTH CARE EDUCATION/TRAINING PROGRAM

## 2024-02-26 PROCEDURE — 85520 HEPARIN ASSAY: CPT

## 2024-02-26 PROCEDURE — 90935 HEMODIALYSIS ONE EVALUATION: CPT

## 2024-02-26 PROCEDURE — 6370000000 HC RX 637 (ALT 250 FOR IP): Performed by: INTERNAL MEDICINE

## 2024-02-26 PROCEDURE — 93306 TTE W/DOPPLER COMPLETE: CPT

## 2024-02-26 PROCEDURE — 6370000000 HC RX 637 (ALT 250 FOR IP): Performed by: STUDENT IN AN ORGANIZED HEALTH CARE EDUCATION/TRAINING PROGRAM

## 2024-02-26 PROCEDURE — 2700000000 HC OXYGEN THERAPY PER DAY

## 2024-02-26 PROCEDURE — 6370000000 HC RX 637 (ALT 250 FOR IP): Performed by: NURSE PRACTITIONER

## 2024-02-26 PROCEDURE — 99255 IP/OBS CONSLTJ NEW/EST HI 80: CPT | Performed by: INTERNAL MEDICINE

## 2024-02-26 PROCEDURE — 80069 RENAL FUNCTION PANEL: CPT

## 2024-02-26 PROCEDURE — 36415 COLL VENOUS BLD VENIPUNCTURE: CPT

## 2024-02-26 PROCEDURE — 94761 N-INVAS EAR/PLS OXIMETRY MLT: CPT

## 2024-02-26 PROCEDURE — 1200000000 HC SEMI PRIVATE

## 2024-02-26 RX ORDER — HEPARIN SODIUM 1000 [USP'U]/ML
1900 INJECTION, SOLUTION INTRAVENOUS; SUBCUTANEOUS ONCE
Status: COMPLETED | OUTPATIENT
Start: 2024-02-26 | End: 2024-02-26

## 2024-02-26 RX ORDER — REGADENOSON 0.08 MG/ML
0.4 INJECTION, SOLUTION INTRAVENOUS
Status: COMPLETED | OUTPATIENT
Start: 2024-02-26 | End: 2024-02-28

## 2024-02-26 RX ORDER — CHLORPROMAZINE HYDROCHLORIDE 25 MG/1
25 TABLET, FILM COATED ORAL ONCE
Status: COMPLETED | OUTPATIENT
Start: 2024-02-26 | End: 2024-02-26

## 2024-02-26 RX ADMIN — HEPARIN SODIUM 1900 UNITS: 1000 INJECTION INTRAVENOUS; SUBCUTANEOUS at 01:52

## 2024-02-26 RX ADMIN — HYDROMORPHONE HYDROCHLORIDE 0.5 MG: 1 INJECTION, SOLUTION INTRAMUSCULAR; INTRAVENOUS; SUBCUTANEOUS at 15:30

## 2024-02-26 RX ADMIN — SEVELAMER CARBONATE 800 MG: 800 TABLET, FILM COATED ORAL at 16:45

## 2024-02-26 RX ADMIN — HEPARIN SODIUM 1900 UNITS: 1000 INJECTION INTRAVENOUS; SUBCUTANEOUS at 23:10

## 2024-02-26 RX ADMIN — ISOSORBIDE MONONITRATE 30 MG: 30 TABLET, EXTENDED RELEASE ORAL at 08:13

## 2024-02-26 RX ADMIN — HYDRALAZINE HYDROCHLORIDE 100 MG: 50 TABLET ORAL at 20:36

## 2024-02-26 RX ADMIN — HYDRALAZINE HYDROCHLORIDE 100 MG: 50 TABLET ORAL at 08:13

## 2024-02-26 RX ADMIN — Medication 10 ML: at 08:13

## 2024-02-26 RX ADMIN — CHLORPROMAZINE HYDROCHLORIDE 25 MG: 25 TABLET, FILM COATED ORAL at 21:11

## 2024-02-26 RX ADMIN — EZETIMIBE 10 MG: 10 TABLET ORAL at 08:13

## 2024-02-26 RX ADMIN — HEPARIN SODIUM 1530 UNITS/HR: 10000 INJECTION, SOLUTION INTRAVENOUS at 06:51

## 2024-02-26 RX ADMIN — HYDROMORPHONE HYDROCHLORIDE 0.5 MG: 1 INJECTION, SOLUTION INTRAMUSCULAR; INTRAVENOUS; SUBCUTANEOUS at 19:39

## 2024-02-26 RX ADMIN — QUETIAPINE FUMARATE 200 MG: 100 TABLET ORAL at 20:36

## 2024-02-26 RX ADMIN — FLUTICASONE PROPIONATE 1 SPRAY: 50 SPRAY, METERED NASAL at 08:13

## 2024-02-26 RX ADMIN — HYDROMORPHONE HYDROCHLORIDE 0.5 MG: 1 INJECTION, SOLUTION INTRAMUSCULAR; INTRAVENOUS; SUBCUTANEOUS at 08:13

## 2024-02-26 RX ADMIN — CLOPIDOGREL BISULFATE 75 MG: 75 TABLET ORAL at 08:12

## 2024-02-26 RX ADMIN — HEPARIN SODIUM 1900 UNITS: 1000 INJECTION INTRAVENOUS; SUBCUTANEOUS at 09:17

## 2024-02-26 RX ADMIN — HEPARIN SODIUM 1660 UNITS/HR: 10000 INJECTION, SOLUTION INTRAVENOUS at 21:59

## 2024-02-26 RX ADMIN — HYDROMORPHONE HYDROCHLORIDE 0.5 MG: 1 INJECTION, SOLUTION INTRAMUSCULAR; INTRAVENOUS; SUBCUTANEOUS at 03:39

## 2024-02-26 RX ADMIN — ONDANSETRON 4 MG: 2 INJECTION INTRAMUSCULAR; INTRAVENOUS at 19:44

## 2024-02-26 RX ADMIN — HYDROMORPHONE HYDROCHLORIDE 0.5 MG: 1 INJECTION, SOLUTION INTRAMUSCULAR; INTRAVENOUS; SUBCUTANEOUS at 23:39

## 2024-02-26 RX ADMIN — ASPIRIN 81 MG 81 MG: 81 TABLET ORAL at 08:13

## 2024-02-26 RX ADMIN — CARVEDILOL 25 MG: 25 TABLET, FILM COATED ORAL at 16:45

## 2024-02-26 RX ADMIN — DOXAZOSIN 2 MG: 2 TABLET ORAL at 20:36

## 2024-02-26 RX ADMIN — PANTOPRAZOLE SODIUM 40 MG: 40 TABLET, DELAYED RELEASE ORAL at 06:52

## 2024-02-26 RX ADMIN — ATORVASTATIN CALCIUM 40 MG: 40 TABLET, FILM COATED ORAL at 20:36

## 2024-02-26 RX ADMIN — SEVELAMER CARBONATE 800 MG: 800 TABLET, FILM COATED ORAL at 08:13

## 2024-02-26 RX ADMIN — HYDRALAZINE HYDROCHLORIDE 100 MG: 50 TABLET ORAL at 15:30

## 2024-02-26 ASSESSMENT — PAIN SCALES - GENERAL
PAINLEVEL_OUTOF10: 8
PAINLEVEL_OUTOF10: 6
PAINLEVEL_OUTOF10: 7
PAINLEVEL_OUTOF10: 8
PAINLEVEL_OUTOF10: 8
PAINLEVEL_OUTOF10: 6
PAINLEVEL_OUTOF10: 6
PAINLEVEL_OUTOF10: 8

## 2024-02-26 ASSESSMENT — PAIN DESCRIPTION - LOCATION: LOCATION: CHEST

## 2024-02-26 NOTE — PROGRESS NOTES
Hospital Medicine Progress Note      Date of Admission: 2/23/2024  Hospital Day: 4    Chief Admission Complaint:  \"Chest pain and URTI\"     Subjective:  no new c/o    Presenting Admission History:         \"Heber Wheeler is a 53 y.o. male with hypertension, hyperlipidemia, ESRD on HD MWF, bipolar type I, T2DM, chronic hepatitis C, chronic back pain, marijuana abuse presented from home with chest pain and flulike symptoms.  Patient reports that he had a problem with his graft on Wednesday and was told to go to Access center, patient went to Access center and underwent some clearing up of clots, by the time he went back to HD they did not have a chair for him.  Thursday started having nasal/sinus congestion headache and nonproductive cough.  This morning started experiencing substernal chest discomfort, describes it as constant sharp nonradiating no aggravating or alleviating factors associated with mild shortness of breath.  During my evaluation patient was alert oriented x 3 hemodynamically stable, recently administered IV Dilaudid and reported no chest pain.  Denies fevers chills night sweats nausea vomiting diarrhea LOC dizziness\"     Assessment/Plan:      Current Principal Problem:  Chest pain       Chest pain - Concerning to ED for ACS but etiology clinically unable to determine, possibly 2nd to HTN.  Initial enzymes/EKG negative.  Follow serial enzymes, reviewed and documented, and monitor on tele - currently w/out evidence of ischemia/arrythmia.  Cardiology consulted and appreciated - NPO after Mn for possible ischemic w/up.    Troponin elevation - c/w myocardial injury 2nd to      [] STEMI/NSTEMI  [x] Trop leak 2nd to CKD/ESRD  [x] Chronic Myocardial Injury 2nd to CHF  [] Recent MI (with 4 weeks of admission) dated:     of unclear clinical significance w/out signs/sxs of active ischemia.  Documented and reviewed. Started on Heparin gtt w/ possible ischemic w/up planned - interventional eval Monday 26 Feb.

## 2024-02-26 NOTE — FLOWSHEET NOTE
02/26/24 1105 02/26/24 1445   Treatment   Time On 1109  --    Time Off  --  1440   Treatment Goal 3000  --    Observations & Evaluations   Level of Consciousness 0 0   Oriented X 3 3   Vital Signs   BP (!) 148/48 (!) 160/58   Temp 98.8 °F (37.1 °C) 98.3 °F (36.8 °C)   Pulse 81 85   Respirations 18 18   Weight - Scale 58.5 kg (128 lb 15.5 oz) 55.5 kg (122 lb 5.7 oz)   Weight Method Standing scale Standing scale   Percent Weight Change -0.02 -5.13   Pain Assessment   Pain Assessment 0-10 0-10   Pain Level 6 8   Treatment Initiation   Dialyze Hours 3.5  --    Treatment  Initiation Universal Precautions maintained;Lines secured to patient;Connections secured;Prime given;Venous Parameters set;Arterial Parameters set;Air foam detector engaged;[place] Device;Dialysate;Saline line double clamped;Hemo-Safe Applied;Dialyzer;F180  --    During Hemodialysis Assessment   Blood Flow Rate (ml/min) 400 ml/min  --    Arterial Pressure (mmHg) -180 mmHg  --    Venous Pressure (mmHg) 160  --    TMP 50  --      --    Access Visible Yes  --    Ultrafiltration Rate (ml/hr) 1000 ml/hr  --    Hemodialysis Fistula/Graft Superior Arm   No placement date or time found.   Present on Admission/Arrival: Yes  Orientation: Superior  Access Location: Arm   Site Assessment Clean, dry & intact Clean, dry & intact   Thrill Present Present   Bruit Present Present   Venous Needle Size 15 G  --    Arterial Needle Size 15 G  --    Accessed By: GLYNN Ho [facility]  --    Access Attempts  1  --    Date of Last Dressing Change 02/26/24  --    Access Interventions Aseptic Technique;Needles taped to patient  --    Post-Hemodialysis Assessment   Post-Treatment Procedures  --  Blood returned;Access bleeding time > 10 minutes   Machine Disinfection Process  --  Acid/Vinegar Clean;Heat Disinfect;Exterior Machine Disinfection   Rinseback Volume (ml)  --  400 ml   Blood Volume Processed (Liters)  --  66.4 L   Dialyzer Clearance  --  Lightly streaked

## 2024-02-26 NOTE — PROGRESS NOTES
Transfer to 105 from 209*      Upon transferring the patient to the ordered level of care, patient’s medications were gathered from the following locations and given to the receiving nurse during bedside report.  Lock Box: YES  Pyxis Bin: YES/  Pyxis Refrigerator: YES  Tube System: /NO    The following paperwork was transferred with the patient:    12 hour chart check: YES/  Blue Medication Book: NO    Patient belongings transferred with patient include: clothing, cell phone,  Tele monitor number  assigned to patient, in place for transfer.  CMU notified of transfer.    LDA’s reviewed and documented.    Continuous Pulse ox in place (if applicable): /NO        Family notified of transfer

## 2024-02-26 NOTE — PROGRESS NOTES
Treatment time: 3.5 hours  Net UF: 3000 ml    Pre weight: 58.5 kg   Post weight: 55.5 kg  EDW: tbd kg    Access used: LAVG  Access function: good with  ml/min    Medications or blood products given: none    Regular outpatient schedule: JOHN Rodriguez    Summary of response to treatment: Tolerated good. VSS    Copy of dialysis treatment record placed in chart, to be scanned into EMR.

## 2024-02-26 NOTE — PROGRESS NOTES
Nephrology Progress Note   [facility].[place]      Sub/interval history  Resting, c/o sob  On 3l NC    HD on 2/26     ROS: No fever/nausea/vomiting  PSFH: No visitor    Scheduled Meds:   clopidogrel  75 mg Oral Daily    doxazosin  2 mg Oral Nightly    ezetimibe  10 mg Oral Daily    hydrALAZINE  100 mg Oral TID    isosorbide mononitrate  30 mg Oral Daily    pantoprazole  40 mg Oral QAM AC    [Held by provider] sacubitril-valsartan  1 tablet Oral BID    sevelamer  800 mg Oral TID WC    fluticasone  1 spray Each Nostril Daily    carvedilol  25 mg Oral BID WC    sodium chloride flush  5-40 mL IntraVENous 2 times per day    aspirin  81 mg Oral Daily    atorvastatin  40 mg Oral Nightly    QUEtiapine  200 mg Oral Nightly     Continuous Infusions:   heparin (PORCINE) Infusion 1,660 Units/hr (02/26/24 0919)    sodium chloride       PRN Meds:.perflutren lipid microspheres, regadenoson, nitroGLYCERIN, heparin (porcine), heparin (porcine), HYDROmorphone, sodium chloride flush, sodium chloride, ondansetron **OR** ondansetron, acetaminophen **OR** acetaminophen, benzonatate    Objective/     Vitals:    02/26/24 0803 02/26/24 0808 02/26/24 0813 02/26/24 0843   BP:  (!) 160/57     Pulse:  85     Resp:  18 18 18   Temp:  99.3 °F (37.4 °C)     TempSrc:  Oral     SpO2: 94% 92%     Weight:       Height:         24HR INTAKE/OUTPUT:    Intake/Output Summary (Last 24 hours) at 2/26/2024 1027  Last data filed at 2/26/2024 0937  Gross per 24 hour   Intake 10 ml   Output 0 ml   Net 10 ml     Gen: alert, awake  Neck: No JVD  Skin: Unremarkable  Cardiovascular:  S1, S2 without m/r/g   Respiratory: decreased breath sounds  Abdomen:  soft, nt, nd,   Extremities: trace lower extremity edema  Neuro/Psy: AAoriented times 3 ; moves all 4 ext    Data/  Recent Labs     02/24/24  1129 02/25/24  0559 02/26/24  0826   WBC 13.2* 14.8* 12.0*   HGB 9.8* 10.3* 9.8*   HCT 30.4* 31.4* 30.2*   MCV 90.4 88.4 87.7    248 229     Recent Labs      02/24/24  0453 02/25/24  0559 02/26/24  0826   * 132* 129*   K 5.3* 4.8 5.2*    93* 89*   CO2 16* 22 21   GLUCOSE 120* 170* 129*   PHOS  --  7.3* 8.0*   BUN 59* 45* 69*   CREATININE 6.8* 5.6* 7.8*   LABGLOM 9* 12* 8*       Assessment/     ESRD:  On HD mwf at [facility].  He has a working AV graft, he was declotted on Friday 2/23/24.  He missed due to graft thrombosis and his last dialysis was Monday ( 2/19 ) prior to admission.    Shortness of breath:  Pulmonary edema due to missed dialysis  Hyperkalemia:  Due to miss HD  Metabolic Acidosis:  Due to ESRD  Hypertension:  Range is elevated in setting of missing dialysis all week and volume overload.  Better post dialysis  Chest Pain:  Cardiology following.  Had anterior STEMI in 5/2023 and had MARVA placed to LAD, ENEIDA, and RCA.  Considering repeat angiogram        Plan/     HD on 2/26 w UF 3 kg as tolerated  Follow labs, echo  Ok for Entresto - his hyperkalemia is largely due to missed dialysis and if he is on regular treatments I do no think Entresto will be any problem     Chinmay Coker MD  Office: [phone]  Fax:    [phone]  [facility]

## 2024-02-26 NOTE — PLAN OF CARE
Problem: Pain  Goal: Verbalizes/displays adequate comfort level or baseline comfort level  Outcome: Progressing  Note: Pt will be satisfied with pain control with PRN dilaudid. Pt uses numeric pain rating scale with reassessments after pain med administration. Will continue to monitor progression throughout shift.       Problem: Safety - Adult  Goal: Free from fall injury  Outcome: Progressing  Note: Pt will remain free from falls throughout hospital stay. Fall precautions in place, bed alarm on, bed in lowest position with wheels locked and side rails 2/4 up. Room door open and hourly rounding completed. Will continue to monitor throughout shift.

## 2024-02-26 NOTE — CARE COORDINATION
Spoke with patient at bedside.   He lives at home with his son. He is independent at home with the exception of driving.  His family provides transportation.  He denies any services or DME at home.  He has insurance and a PCP.  He gets dialysis at the [facility] on MWF schedule.  Placed call to Marie with [facility] and she confirmed the above and also confirmed they will be ready for him to return for his scheduled dialysis at discharge.

## 2024-02-27 ENCOUNTER — APPOINTMENT (OUTPATIENT)
Dept: NUCLEAR MEDICINE | Age: 54
DRG: 194 | End: 2024-02-27
Payer: MEDICAID

## 2024-02-27 LAB
ALBUMIN SERPL-MCNC: 3.6 G/DL (ref 3.4–5)
ANION GAP SERPL CALCULATED.3IONS-SCNC: 18 MMOL/L (ref 3–16)
ANTI-XA UNFRAC HEPARIN: 0.48 IU/ML (ref 0.3–0.7)
ANTI-XA UNFRAC HEPARIN: 0.57 IU/ML (ref 0.3–0.7)
BUN SERPL-MCNC: 44 MG/DL (ref 7–20)
CALCIUM SERPL-MCNC: 9.2 MG/DL (ref 8.3–10.6)
CHLORIDE SERPL-SCNC: 89 MMOL/L (ref 99–110)
CO2 SERPL-SCNC: 23 MMOL/L (ref 21–32)
CREAT SERPL-MCNC: 6.1 MG/DL (ref 0.9–1.3)
GFR SERPLBLD CREATININE-BSD FMLA CKD-EPI: 10 ML/MIN/{1.73_M2}
GLUCOSE SERPL-MCNC: 143 MG/DL (ref 70–99)
PHOSPHATE SERPL-MCNC: 7.5 MG/DL (ref 2.5–4.9)
POTASSIUM SERPL-SCNC: 4.8 MMOL/L (ref 3.5–5.1)
SODIUM SERPL-SCNC: 130 MMOL/L (ref 136–145)

## 2024-02-27 PROCEDURE — 6360000002 HC RX W HCPCS: Performed by: INTERNAL MEDICINE

## 2024-02-27 PROCEDURE — 94761 N-INVAS EAR/PLS OXIMETRY MLT: CPT

## 2024-02-27 PROCEDURE — 36415 COLL VENOUS BLD VENIPUNCTURE: CPT

## 2024-02-27 PROCEDURE — 6370000000 HC RX 637 (ALT 250 FOR IP): Performed by: INTERNAL MEDICINE

## 2024-02-27 PROCEDURE — 1200000000 HC SEMI PRIVATE

## 2024-02-27 PROCEDURE — 2700000000 HC OXYGEN THERAPY PER DAY

## 2024-02-27 PROCEDURE — 85520 HEPARIN ASSAY: CPT

## 2024-02-27 PROCEDURE — 6370000000 HC RX 637 (ALT 250 FOR IP): Performed by: STUDENT IN AN ORGANIZED HEALTH CARE EDUCATION/TRAINING PROGRAM

## 2024-02-27 PROCEDURE — 80069 RENAL FUNCTION PANEL: CPT

## 2024-02-27 PROCEDURE — 6360000002 HC RX W HCPCS: Performed by: STUDENT IN AN ORGANIZED HEALTH CARE EDUCATION/TRAINING PROGRAM

## 2024-02-27 PROCEDURE — 6370000000 HC RX 637 (ALT 250 FOR IP): Performed by: NURSE PRACTITIONER

## 2024-02-27 RX ORDER — PROCHLORPERAZINE EDISYLATE 5 MG/ML
10 INJECTION INTRAMUSCULAR; INTRAVENOUS EVERY 6 HOURS PRN
Status: DISCONTINUED | OUTPATIENT
Start: 2024-02-27 | End: 2024-03-02 | Stop reason: HOSPADM

## 2024-02-27 RX ORDER — PROMETHAZINE HYDROCHLORIDE 25 MG/1
25 TABLET ORAL EVERY 6 HOURS PRN
Status: DISCONTINUED | OUTPATIENT
Start: 2024-02-27 | End: 2024-03-02 | Stop reason: HOSPADM

## 2024-02-27 RX ORDER — SIMETHICONE 80 MG
80 TABLET,CHEWABLE ORAL EVERY 6 HOURS PRN
Status: DISCONTINUED | OUTPATIENT
Start: 2024-02-27 | End: 2024-03-02 | Stop reason: HOSPADM

## 2024-02-27 RX ADMIN — ACETAMINOPHEN 650 MG: 325 TABLET ORAL at 03:37

## 2024-02-27 RX ADMIN — HYDROMORPHONE HYDROCHLORIDE 0.5 MG: 1 INJECTION, SOLUTION INTRAMUSCULAR; INTRAVENOUS; SUBCUTANEOUS at 03:39

## 2024-02-27 RX ADMIN — HYDRALAZINE HYDROCHLORIDE 100 MG: 50 TABLET ORAL at 10:07

## 2024-02-27 RX ADMIN — DOXAZOSIN 2 MG: 2 TABLET ORAL at 20:17

## 2024-02-27 RX ADMIN — CARVEDILOL 25 MG: 25 TABLET, FILM COATED ORAL at 10:08

## 2024-02-27 RX ADMIN — FLUTICASONE PROPIONATE 1 SPRAY: 50 SPRAY, METERED NASAL at 10:08

## 2024-02-27 RX ADMIN — HYDRALAZINE HYDROCHLORIDE 100 MG: 50 TABLET ORAL at 20:17

## 2024-02-27 RX ADMIN — HYDRALAZINE HYDROCHLORIDE 100 MG: 50 TABLET ORAL at 12:45

## 2024-02-27 RX ADMIN — ATORVASTATIN CALCIUM 40 MG: 40 TABLET, FILM COATED ORAL at 20:17

## 2024-02-27 RX ADMIN — PANTOPRAZOLE SODIUM 40 MG: 40 TABLET, DELAYED RELEASE ORAL at 05:11

## 2024-02-27 RX ADMIN — ISOSORBIDE MONONITRATE 30 MG: 30 TABLET, EXTENDED RELEASE ORAL at 10:07

## 2024-02-27 RX ADMIN — EZETIMIBE 10 MG: 10 TABLET ORAL at 10:08

## 2024-02-27 RX ADMIN — HYDROMORPHONE HYDROCHLORIDE 0.5 MG: 1 INJECTION, SOLUTION INTRAMUSCULAR; INTRAVENOUS; SUBCUTANEOUS at 21:31

## 2024-02-27 RX ADMIN — CARVEDILOL 25 MG: 25 TABLET, FILM COATED ORAL at 15:57

## 2024-02-27 RX ADMIN — ONDANSETRON 4 MG: 2 INJECTION INTRAMUSCULAR; INTRAVENOUS at 10:08

## 2024-02-27 RX ADMIN — ASPIRIN 81 MG 81 MG: 81 TABLET ORAL at 10:07

## 2024-02-27 RX ADMIN — SEVELAMER CARBONATE 800 MG: 800 TABLET, FILM COATED ORAL at 12:45

## 2024-02-27 RX ADMIN — SACUBITRIL AND VALSARTAN 1 TABLET: 24; 26 TABLET, FILM COATED ORAL at 20:17

## 2024-02-27 RX ADMIN — CLOPIDOGREL BISULFATE 75 MG: 75 TABLET ORAL at 10:07

## 2024-02-27 RX ADMIN — HYDROMORPHONE HYDROCHLORIDE 0.5 MG: 1 INJECTION, SOLUTION INTRAMUSCULAR; INTRAVENOUS; SUBCUTANEOUS at 17:04

## 2024-02-27 RX ADMIN — QUETIAPINE FUMARATE 200 MG: 100 TABLET ORAL at 20:17

## 2024-02-27 RX ADMIN — SEVELAMER CARBONATE 800 MG: 800 TABLET, FILM COATED ORAL at 10:07

## 2024-02-27 RX ADMIN — PROMETHAZINE HYDROCHLORIDE 25 MG: 25 TABLET ORAL at 13:01

## 2024-02-27 RX ADMIN — HYDROMORPHONE HYDROCHLORIDE 0.5 MG: 1 INJECTION, SOLUTION INTRAMUSCULAR; INTRAVENOUS; SUBCUTANEOUS at 13:04

## 2024-02-27 RX ADMIN — SEVELAMER CARBONATE 800 MG: 800 TABLET, FILM COATED ORAL at 15:57

## 2024-02-27 RX ADMIN — SIMETHICONE 80 MG: 80 TABLET, CHEWABLE ORAL at 15:57

## 2024-02-27 RX ADMIN — HEPARIN SODIUM 1790 UNITS/HR: 10000 INJECTION, SOLUTION INTRAVENOUS at 12:41

## 2024-02-27 ASSESSMENT — PAIN SCALES - GENERAL
PAINLEVEL_OUTOF10: 8
PAINLEVEL_OUTOF10: 8

## 2024-02-27 ASSESSMENT — PAIN DESCRIPTION - LOCATION: LOCATION: ABDOMEN

## 2024-02-27 NOTE — PROGRESS NOTES
Patient refused stress test d/t nausea and dizziness. /54 HR 84. Will attempt stress test tomorrow 2/28 if patient is feeling better.  Floor RN notified.

## 2024-02-27 NOTE — PROGRESS NOTES
Consults:      IP CONSULT TO CARDIOLOGY  IP CONSULT TO NEPHROLOGY      This patient has a high likelihood of being discharged tomorrow and is appropriate for A1 Discharge Unit in AM pending clinical course overnight: []Yes  [x]No    ------------------------------------------------------------------------------------------------------------------------------------------------------------------------    [facility]  (this section is simply meant as an aid to accurate billing and does not necessarily reflect ongoing patient care which is documented elsewhere in the medical record)    [x] High (any 2)    A. Problems (any 1)  [] Acute/Chronic Illness/injury posing threat to life or bodily function:    [] Severe exacerbation of chronic illness:    ---------------------------------------------------------------------  B. Risk of Treatment (any 1)   [x] Drugs/treatments that require intensive monitoring for toxicity include:    [] IV ABX requiring serial renal monitoring for nephrotoxicity:     [] Post-Cath/Contrast study requiring serial renal monitoring for Contrast Induced Nephropathy  [] IV Narcotic analgesia for ADR   [] Aggressive IV diuresis requiring serial monitoring for renal impairment and electrolyte derangements  [] Hypertonic Saline requiring serial renal monitoring for appropriate electrolyte correction rate   [] Critical electrolyte abnormalities requiring IV replacement and close serial monitoring  [] Insulin - monitoring FSBS for Hypoglycemic ADR  [x] Other - HD, Heparin gtt   [] Change in code status:    [] Decision to escalate care:    [] Major surgery/procedure with associated risk factors:    ----------------------------------------------------------------------  C. Data (any 2)  [] Discussed management of the case with:    [x] Discussed the discharge plan in detail with case mgt including timing/barriers to discharge, need for support services and placement decision   [x] Imaging personally reviewed and  input(s): \"AST\", \"ALT\", \"BILIDIR\", \"BILITOT\", \"ALKPHOS\" in the last 72 hours.    Recent Labs     02/24/24  1129   INR 1.24*       Urine Cultures:   Lab Results   Component Value Date/Time    LABURIN No growth at 18-36 hours 06/19/2018 04:15 PM    LABURIN 50 06/06/2013 01:49 PM     Blood Cultures:   Lab Results   Component Value Date/Time    BC No Growth after 4 days of incubation. 12/03/2023 04:55 PM     Lab Results   Component Value Date/Time    BLOODCULT2 No Growth after 4 days of incubation. 12/03/2023 05:21 PM     Organism:   Lab Results   Component Value Date/Time    ORG Prevotella buccae 11/28/2017 11:10 AM    ORG Prevotella oralis 11/28/2017 11:10 AM         Heber Mendiola MD

## 2024-02-27 NOTE — PROGRESS NOTES
Paged NP regarding pt having continuous hiccups and chest pain from this. Explained pt is on heparin gtt for elevated troponin. Pt going for stress test tomorrow. Pt requesting increase in pain medication or something for hiccups. Prn zofran and dilaudid given. Waiting on pharmacy for thorazine    Thorazine given at 9:11

## 2024-02-27 NOTE — PROGRESS NOTES
[facility]phrology Progress Note   [email or web address]      Sub/interval history    Felt dizzy     HD on 2/26 w 3ll net UF, post wt 55 .5 kg     ROS: No fever/nausea/vomiting  PSFH: + visitor    Scheduled Meds:   clopidogrel  75 mg Oral Daily    doxazosin  2 mg Oral Nightly    ezetimibe  10 mg Oral Daily    hydrALAZINE  100 mg Oral TID    isosorbide mononitrate  30 mg Oral Daily    pantoprazole  40 mg Oral QAM AC    [Held by provider] sacubitril-valsartan  1 tablet Oral BID    sevelamer  800 mg Oral TID WC    fluticasone  1 spray Each Nostril Daily    carvedilol  25 mg Oral BID WC    sodium chloride flush  5-40 mL IntraVENous 2 times per day    aspirin  81 mg Oral Daily    atorvastatin  40 mg Oral Nightly    QUEtiapine  200 mg Oral Nightly     Continuous Infusions:   heparin (PORCINE) Infusion 1,790 Units/hr (02/27/24 1241)    sodium chloride       PRN Meds:.promethazine, prochlorperazine, simethicone, perflutren lipid microspheres, regadenoson, nitroGLYCERIN, heparin (porcine), heparin (porcine), HYDROmorphone, sodium chloride flush, sodium chloride, ondansetron **OR** ondansetron, acetaminophen **OR** acetaminophen, benzonatate    Objective/     Vitals:    02/26/24 2027 02/26/24 2308 02/27/24 0306 02/27/24 1006   BP: (!) 141/64 (!) 142/57 (!) 143/53 (!) 152/61   Pulse: 84 84 87 81   Resp: 20 18 20 18   Temp: 98.6 °F (37 °C) 98.6 °F (37 °C) 99.4 °F (37.4 °C) 97.8 °F (36.6 °C)   TempSrc: Oral Oral Oral Oral   SpO2: 90% 91% 90% 93%   Weight:       Height:         24HR INTAKE/OUTPUT:    Intake/Output Summary (Last 24 hours) at 2/27/2024 1431  Last data filed at 2/27/2024 0524  Gross per 24 hour   Intake 1609 ml   Output 3500 ml   Net -1891 ml       Gen: alert, awake  Neck: No JVD  Skin: Unremarkable  Cardiovascular:  S1, S2 without m/r/g   Respiratory: decreased breath sounds  Abdomen:  soft, nt, nd,   Extremities: trace lower extremity edema  Neuro/Psy: AAoriented times 3 ; moves all 4 ext    Data/  Recent Labs

## 2024-02-28 ENCOUNTER — APPOINTMENT (OUTPATIENT)
Dept: NUCLEAR MEDICINE | Age: 54
DRG: 194 | End: 2024-02-28
Payer: MEDICAID

## 2024-02-28 LAB
ALBUMIN SERPL-MCNC: 3.4 G/DL (ref 3.4–5)
ANION GAP SERPL CALCULATED.3IONS-SCNC: 20 MMOL/L (ref 3–16)
ANTI-XA UNFRAC HEPARIN: 0.38 IU/ML (ref 0.3–0.7)
BUN SERPL-MCNC: 67 MG/DL (ref 7–20)
CALCIUM SERPL-MCNC: 8.7 MG/DL (ref 8.3–10.6)
CHLORIDE SERPL-SCNC: 85 MMOL/L (ref 99–110)
CO2 SERPL-SCNC: 22 MMOL/L (ref 21–32)
CREAT SERPL-MCNC: 8 MG/DL (ref 0.9–1.3)
DEPRECATED RDW RBC AUTO: 14.9 % (ref 12.4–15.4)
GFR SERPLBLD CREATININE-BSD FMLA CKD-EPI: 7 ML/MIN/{1.73_M2}
GLUCOSE SERPL-MCNC: 130 MG/DL (ref 70–99)
HCT VFR BLD AUTO: 28.4 % (ref 40.5–52.5)
HGB BLD-MCNC: 9.7 G/DL (ref 13.5–17.5)
MCH RBC QN AUTO: 29.4 PG (ref 26–34)
MCHC RBC AUTO-ENTMCNC: 34.1 G/DL (ref 31–36)
MCV RBC AUTO: 86.1 FL (ref 80–100)
PHOSPHATE SERPL-MCNC: 7.7 MG/DL (ref 2.5–4.9)
PLATELET # BLD AUTO: 281 K/UL (ref 135–450)
PMV BLD AUTO: 8.6 FL (ref 5–10.5)
POTASSIUM SERPL-SCNC: 4.3 MMOL/L (ref 3.5–5.1)
RBC # BLD AUTO: 3.3 M/UL (ref 4.2–5.9)
SODIUM SERPL-SCNC: 127 MMOL/L (ref 136–145)
WBC # BLD AUTO: 10 K/UL (ref 4–11)

## 2024-02-28 PROCEDURE — 6360000002 HC RX W HCPCS: Performed by: INTERNAL MEDICINE

## 2024-02-28 PROCEDURE — 6370000000 HC RX 637 (ALT 250 FOR IP)

## 2024-02-28 PROCEDURE — 2580000003 HC RX 258: Performed by: STUDENT IN AN ORGANIZED HEALTH CARE EDUCATION/TRAINING PROGRAM

## 2024-02-28 PROCEDURE — 6370000000 HC RX 637 (ALT 250 FOR IP): Performed by: INTERNAL MEDICINE

## 2024-02-28 PROCEDURE — 93017 CV STRESS TEST TRACING ONLY: CPT

## 2024-02-28 PROCEDURE — 80069 RENAL FUNCTION PANEL: CPT

## 2024-02-28 PROCEDURE — 36415 COLL VENOUS BLD VENIPUNCTURE: CPT

## 2024-02-28 PROCEDURE — 6370000000 HC RX 637 (ALT 250 FOR IP): Performed by: NURSE PRACTITIONER

## 2024-02-28 PROCEDURE — 85027 COMPLETE CBC AUTOMATED: CPT

## 2024-02-28 PROCEDURE — 3430000000 HC RX DIAGNOSTIC RADIOPHARMACEUTICAL: Performed by: INTERNAL MEDICINE

## 2024-02-28 PROCEDURE — 1200000000 HC SEMI PRIVATE

## 2024-02-28 PROCEDURE — 6370000000 HC RX 637 (ALT 250 FOR IP): Performed by: STUDENT IN AN ORGANIZED HEALTH CARE EDUCATION/TRAINING PROGRAM

## 2024-02-28 PROCEDURE — A9502 TC99M TETROFOSMIN: HCPCS | Performed by: INTERNAL MEDICINE

## 2024-02-28 PROCEDURE — 85520 HEPARIN ASSAY: CPT

## 2024-02-28 PROCEDURE — 90935 HEMODIALYSIS ONE EVALUATION: CPT

## 2024-02-28 PROCEDURE — 78452 HT MUSCLE IMAGE SPECT MULT: CPT

## 2024-02-28 RX ORDER — MIDODRINE HYDROCHLORIDE 5 MG/1
10 TABLET ORAL
Status: COMPLETED | OUTPATIENT
Start: 2024-02-28 | End: 2024-02-28

## 2024-02-28 RX ORDER — MIDODRINE HYDROCHLORIDE 5 MG/1
TABLET ORAL
Status: COMPLETED
Start: 2024-02-28 | End: 2024-02-28

## 2024-02-28 RX ORDER — PSEUDOEPHEDRINE HCL 30 MG
30 TABLET ORAL EVERY 4 HOURS PRN
Status: DISCONTINUED | OUTPATIENT
Start: 2024-02-28 | End: 2024-03-02 | Stop reason: HOSPADM

## 2024-02-28 RX ADMIN — REGADENOSON 0.4 MG: 0.08 INJECTION, SOLUTION INTRAVENOUS at 09:25

## 2024-02-28 RX ADMIN — FLUTICASONE PROPIONATE 1 SPRAY: 50 SPRAY, METERED NASAL at 10:59

## 2024-02-28 RX ADMIN — SEVELAMER CARBONATE 800 MG: 800 TABLET, FILM COATED ORAL at 10:58

## 2024-02-28 RX ADMIN — MIDODRINE HYDROCHLORIDE 10 MG: 5 TABLET ORAL at 15:28

## 2024-02-28 RX ADMIN — CLOPIDOGREL BISULFATE 75 MG: 75 TABLET ORAL at 10:58

## 2024-02-28 RX ADMIN — ASPIRIN 81 MG 81 MG: 81 TABLET ORAL at 10:58

## 2024-02-28 RX ADMIN — HYDROMORPHONE HYDROCHLORIDE 0.5 MG: 1 INJECTION, SOLUTION INTRAMUSCULAR; INTRAVENOUS; SUBCUTANEOUS at 04:08

## 2024-02-28 RX ADMIN — ATORVASTATIN CALCIUM 40 MG: 40 TABLET, FILM COATED ORAL at 19:30

## 2024-02-28 RX ADMIN — CARVEDILOL 25 MG: 25 TABLET, FILM COATED ORAL at 19:33

## 2024-02-28 RX ADMIN — SACUBITRIL AND VALSARTAN 1 TABLET: 24; 26 TABLET, FILM COATED ORAL at 10:58

## 2024-02-28 RX ADMIN — DOXAZOSIN 2 MG: 2 TABLET ORAL at 19:30

## 2024-02-28 RX ADMIN — TETROFOSMIN 11.3 MILLICURIE: 1.38 INJECTION, POWDER, LYOPHILIZED, FOR SOLUTION INTRAVENOUS at 07:30

## 2024-02-28 RX ADMIN — SACUBITRIL AND VALSARTAN 1 TABLET: 24; 26 TABLET, FILM COATED ORAL at 19:30

## 2024-02-28 RX ADMIN — TETROFOSMIN 31 MILLICURIE: 1.38 INJECTION, POWDER, LYOPHILIZED, FOR SOLUTION INTRAVENOUS at 09:25

## 2024-02-28 RX ADMIN — HEPARIN SODIUM 1790 UNITS/HR: 10000 INJECTION, SOLUTION INTRAVENOUS at 04:13

## 2024-02-28 RX ADMIN — ISOSORBIDE MONONITRATE 30 MG: 30 TABLET, EXTENDED RELEASE ORAL at 10:58

## 2024-02-28 RX ADMIN — HYDRALAZINE HYDROCHLORIDE 100 MG: 50 TABLET ORAL at 19:30

## 2024-02-28 RX ADMIN — PSEUDOEPHEDRINE HCL 30 MG: 30 TABLET, FILM COATED ORAL at 19:33

## 2024-02-28 RX ADMIN — HYDRALAZINE HYDROCHLORIDE 100 MG: 50 TABLET ORAL at 10:59

## 2024-02-28 RX ADMIN — HYDROMORPHONE HYDROCHLORIDE 0.5 MG: 1 INJECTION, SOLUTION INTRAMUSCULAR; INTRAVENOUS; SUBCUTANEOUS at 22:55

## 2024-02-28 RX ADMIN — EZETIMIBE 10 MG: 10 TABLET ORAL at 10:58

## 2024-02-28 RX ADMIN — CARVEDILOL 25 MG: 25 TABLET, FILM COATED ORAL at 10:58

## 2024-02-28 RX ADMIN — HYDROMORPHONE HYDROCHLORIDE 0.5 MG: 1 INJECTION, SOLUTION INTRAMUSCULAR; INTRAVENOUS; SUBCUTANEOUS at 18:34

## 2024-02-28 RX ADMIN — HYDROMORPHONE HYDROCHLORIDE 0.5 MG: 1 INJECTION, SOLUTION INTRAMUSCULAR; INTRAVENOUS; SUBCUTANEOUS at 13:15

## 2024-02-28 RX ADMIN — Medication 10 ML: at 19:34

## 2024-02-28 RX ADMIN — HYDROMORPHONE HYDROCHLORIDE 0.5 MG: 1 INJECTION, SOLUTION INTRAMUSCULAR; INTRAVENOUS; SUBCUTANEOUS at 09:14

## 2024-02-28 RX ADMIN — Medication 10 ML: at 10:59

## 2024-02-28 RX ADMIN — QUETIAPINE FUMARATE 200 MG: 100 TABLET ORAL at 19:30

## 2024-02-28 ASSESSMENT — PAIN SCALES - GENERAL
PAINLEVEL_OUTOF10: 8
PAINLEVEL_OUTOF10: 9
PAINLEVEL_OUTOF10: 7
PAINLEVEL_OUTOF10: 9
PAINLEVEL_OUTOF10: 10
PAINLEVEL_OUTOF10: 9
PAINLEVEL_OUTOF10: 8
PAINLEVEL_OUTOF10: 8

## 2024-02-28 ASSESSMENT — PAIN DESCRIPTION - DESCRIPTORS
DESCRIPTORS: ACHING;DISCOMFORT
DESCRIPTORS: ACHING;DISCOMFORT
DESCRIPTORS: DISCOMFORT
DESCRIPTORS: ACHING
DESCRIPTORS: DISCOMFORT
DESCRIPTORS: ACHING
DESCRIPTORS: ACHING;DISCOMFORT

## 2024-02-28 ASSESSMENT — PAIN DESCRIPTION - ORIENTATION
ORIENTATION: LEFT
ORIENTATION: MID
ORIENTATION: LEFT

## 2024-02-28 ASSESSMENT — PAIN DESCRIPTION - LOCATION
LOCATION: EAR;CHEST
LOCATION: CHEST;FACE
LOCATION: EAR
LOCATION: EAR;CHEST
LOCATION: CHEST;HEAD
LOCATION: CHEST;EAR
LOCATION: CHEST;EAR;THROAT
LOCATION: EAR

## 2024-02-28 ASSESSMENT — PAIN - FUNCTIONAL ASSESSMENT: PAIN_FUNCTIONAL_ASSESSMENT: ACTIVITIES ARE NOT PREVENTED

## 2024-02-28 NOTE — DIALYSIS
Treatment time: 3.5 hours  Net UF: 0 ml     Pre weight: 58 kg  Post weight:58 kg  EDW: 56 kg,     Access used: L AVF     Access function: well with  ml/min     Medications or blood products given: Midodrine      Regular outpatient schedule: MWF     Summary of response to treatment: Patient tolerated treatment fair with drop in BP. Dr. Coker notified, ordered to keep even and give midodrine. Patient remained stable throughout entire treatment and upon the exiting the dialysis suite via transport.     Report given to Agnes Quiros RN and copy of dialysis treatment record placed in chart, to be scanned into EMR.

## 2024-02-28 NOTE — FLOWSHEET NOTE
02/27/24 2000   Assessment   Charting Type Shift assessment   Psychosocial   Psychosocial (WDL) WDL   Neurological   Neuro (WDL) WDL   Level of Consciousness 0   Orientation Level Oriented X4   Cognition Follows commands   Speech Clear   RUE Motor Response Responds to command   RUE Sensation  Full sensation   LUE Motor Response Responds to command   LUE Sensation  Decreased  (d/t past nerve block)   RLE Motor Response Responds to command   RLE Sensation  Full sensation   LLE Motor Response Responds to command   LLE Sensation  Full sensation   Surveyor Coma Scale   Eye Opening 4   Best Verbal Response 5   Best Motor Response 6   Liz Coma Scale Score 15   HEENT (Head, Ears, Eyes, Nose, & Throat)   HEENT (WDL) X   Right Eye Impaired vision;Glasses  (retinopathy)   Left Eye Impaired vision;Glasses  (retinopathy)   Respiratory   Respiratory (WDL) X   Respiratory Pattern Regular   Respiratory Depth Normal   Respiratory Quality/Effort Dyspnea with exertion   Chest Assessment Chest expansion symmetrical   L Breath Sounds Diminished   R Breath Sounds Diminished   Cough/Sputum   Cough Strong;Congested;Productive   Sputum Amount Moderate   Sputum Color Yellow;White   Tenacity Thick   Cardiac   Cardiac (WDL) X   Cardiac Regularity Regular   Heart Sounds S1, S2   Cardiac Rhythm Sinus rhythm   Gastrointestinal   Abdominal (WDL) X   Abdomen Inspection Soft;Flat   RUQ Bowel Sounds Active   LUQ Bowel Sounds Active   RLQ Bowel Sounds Active   LLQ Bowel Sounds Active   GI Symptoms Hiccuping;Reduction in appetite   Tenderness Nontender   Genitourinary   Genitourinary (WDL) X  (anuric)   Suprapubic Tenderness No   Dysuria (Pain/Burning w/Urination) ALFRED   Peripheral Vascular   Peripheral Vascular (WDL) X   Edema Right lower extremity;Left lower extremity   RLE Edema Trace   LLE Edema Trace   Skin Integumentary    Skin Integumentary (WDL) X   Skin Color Pale   Skin Condition/Temp Warm   Skin Integrity Ecchymosis   Location scattered

## 2024-02-28 NOTE — PLAN OF CARE
Problem: Discharge Planning  Goal: Discharge to home or other facility with appropriate resources  Outcome: Progressing  Flowsheets (Taken 2/28/2024 0700)  Discharge to home or other facility with appropriate resources:   Identify barriers to discharge with patient and caregiver   Arrange for needed discharge resources and transportation as appropriate   Identify discharge learning needs (meds, wound care, etc)     Problem: Safety - Adult  Goal: Free from fall injury  Outcome: Progressing     Problem: Chronic Conditions and Co-morbidities  Goal: Patient's chronic conditions and co-morbidity symptoms are monitored and maintained or improved  Outcome: Progressing  Flowsheets (Taken 2/28/2024 0700)  Care Plan - Patient's Chronic Conditions and Co-Morbidity Symptoms are Monitored and Maintained or Improved: Monitor and assess patient's chronic conditions and comorbid symptoms for stability, deterioration, or improvement

## 2024-02-28 NOTE — PROGRESS NOTES
Nephrology Progress Note   [email or web address]      Sub/interval history    Feeling dizzy still   Getting stress test  HD on 2/28 later with TW of 56 kg   HD on 2/26 w 3ll net UF, post wt 55 .5 kg     ROS: No fever/nausea/vomiting  PSFH: No visitor    Scheduled Meds:   clopidogrel  75 mg Oral Daily    doxazosin  2 mg Oral Nightly    ezetimibe  10 mg Oral Daily    hydrALAZINE  100 mg Oral TID    isosorbide mononitrate  30 mg Oral Daily    pantoprazole  40 mg Oral QAM AC    sacubitril-valsartan  1 tablet Oral BID    sevelamer  800 mg Oral TID WC    fluticasone  1 spray Each Nostril Daily    carvedilol  25 mg Oral BID WC    sodium chloride flush  5-40 mL IntraVENous 2 times per day    aspirin  81 mg Oral Daily    atorvastatin  40 mg Oral Nightly    QUEtiapine  200 mg Oral Nightly     Continuous Infusions:   heparin (PORCINE) Infusion 1,790 Units/hr (02/28/24 0413)    sodium chloride       PRN Meds:.promethazine, prochlorperazine, simethicone, perflutren lipid microspheres, nitroGLYCERIN, heparin (porcine), heparin (porcine), HYDROmorphone, sodium chloride flush, sodium chloride, ondansetron **OR** ondansetron, acetaminophen **OR** acetaminophen, benzonatate    Objective/     Vitals:    02/28/24 0408 02/28/24 0415 02/28/24 0438 02/28/24 0700   BP:  (!) 140/60  (!) 143/56   Pulse:  65  75   Resp: 16 16 16 16   Temp:  98.2 °F (36.8 °C)  97.8 °F (36.6 °C)   TempSrc:  Oral  Oral   SpO2:  94%  93%   Weight:       Height:         24HR INTAKE/OUTPUT:    Intake/Output Summary (Last 24 hours) at 2/28/2024 1025  Last data filed at 2/28/2024 0415  Gross per 24 hour   Intake 240 ml   Output 100 ml   Net 140 ml       Gen: alert, awake  Neck: No JVD  Skin: Unremarkable  Cardiovascular:  S1, S2 without m/r/g   Respiratory: decreased breath sounds, coarse   Abdomen:  soft, nt, nd,   Extremities: trace lower extremity edema  Neuro/Psy: AAoriented times 3 ; moves all 4 ext    Data/  Recent Labs     02/26/24  0826 02/28/24  0640   WBC

## 2024-02-28 NOTE — PROGRESS NOTES
Hospital Medicine Progress Note      Date of Admission: 2/23/2024  Hospital Day: 6    Chief Admission Complaint:  \"Chest pain and URTI\"     Subjective:  no new c/o    Presenting Admission History:         \"Heber Wheeler is a 53 y.o. male with hypertension, hyperlipidemia, ESRD on HD MWF, bipolar type I, T2DM, chronic hepatitis C, chronic back pain, marijuana abuse presented from home with chest pain and flulike symptoms.  Patient reports that he had a problem with his graft on Wednesday and was told to go to Access center, patient went to Access center and underwent some clearing up of clots, by the time he went back to HD they did not have a chair for him.  Thursday started having nasal/sinus congestion headache and nonproductive cough.  This morning started experiencing substernal chest discomfort, describes it as constant sharp nonradiating no aggravating or alleviating factors associated with mild shortness of breath.  During my evaluation patient was alert oriented x 3 hemodynamically stable, recently administered IV Dilaudid and reported no chest pain.  Denies fevers chills night sweats nausea vomiting diarrhea LOC dizziness\"     Assessment/Plan:      Current Principal Problem:  Chest pain       Chest pain - Concerning to ED for ACS but etiology clinically unable to determine, possibly 2nd to HTN.  Initial enzymes/EKG negative.  Follow serial enzymes, reviewed and documented, and monitor on tele - currently w/out evidence of ischemia/arrythmia.  Cardiology consulted and appreciated - stress done 28 Feb.    Troponin elevation - c/w myocardial injury 2nd to      [] STEMI/NSTEMI  [x] Trop leak 2nd to CKD/ESRD  [x] Chronic Myocardial Injury 2nd to CHF  [] Recent MI (with 4 weeks of admission) dated:     of unclear clinical significance w/out signs/sxs of active ischemia.  Documented and reviewed. Started on Heparin gtt w/ possible ischemic w/up in progress.       HTN - w/out known CAD and no evidence of active  likelihood of being discharged tomorrow and is appropriate for A1 Discharge Unit in AM pending clinical course overnight: []Yes  [x]No    ------------------------------------------------------------------------------------------------------------------------------------------------------------------------    [facility]  (this section is simply meant as an aid to accurate billing and does not necessarily reflect ongoing patient care which is documented elsewhere in the medical record)    [x] High (any 2)    A. Problems (any 1)  [] Acute/Chronic Illness/injury posing threat to life or bodily function:    [] Severe exacerbation of chronic illness:    ---------------------------------------------------------------------  B. Risk of Treatment (any 1)   [x] Drugs/treatments that require intensive monitoring for toxicity include:    [] IV ABX requiring serial renal monitoring for nephrotoxicity:     [] Post-Cath/Contrast study requiring serial renal monitoring for Contrast Induced Nephropathy  [] IV Narcotic analgesia for ADR   [] Aggressive IV diuresis requiring serial monitoring for renal impairment and electrolyte derangements  [] Hypertonic Saline requiring serial renal monitoring for appropriate electrolyte correction rate   [] Critical electrolyte abnormalities requiring IV replacement and close serial monitoring  [] Insulin - monitoring FSBS for Hypoglycemic ADR  [x] Other - HD, Heparin gtt   [] Change in code status:    [] Decision to escalate care:    [] Major surgery/procedure with associated risk factors:    ----------------------------------------------------------------------  C. Data (any 2)  [] Discussed management of the case with:    [x] Discussed the discharge plan in detail with case mgt including timing/barriers to discharge, need for support services and placement decision   [x] Imaging personally reviewed and interpreted, includes:   [x] Telemetry monitoring w/ NSR   [x] Data Review (any 3)  [] Collateral

## 2024-02-29 ENCOUNTER — ANESTHESIA EVENT (OUTPATIENT)
Dept: ENDOSCOPY | Age: 54
End: 2024-02-29
Payer: MEDICAID

## 2024-02-29 LAB
ALBUMIN SERPL-MCNC: 3.3 G/DL (ref 3.4–5)
ANION GAP SERPL CALCULATED.3IONS-SCNC: 15 MMOL/L (ref 3–16)
BUN SERPL-MCNC: 37 MG/DL (ref 7–20)
CALCIUM SERPL-MCNC: 8.3 MG/DL (ref 8.3–10.6)
CHLORIDE SERPL-SCNC: 94 MMOL/L (ref 99–110)
CO2 SERPL-SCNC: 23 MMOL/L (ref 21–32)
CREAT SERPL-MCNC: 5.6 MG/DL (ref 0.9–1.3)
DEPRECATED RDW RBC AUTO: 14.8 % (ref 12.4–15.4)
GFR SERPLBLD CREATININE-BSD FMLA CKD-EPI: 12 ML/MIN/{1.73_M2}
GLUCOSE SERPL-MCNC: 142 MG/DL (ref 70–99)
HCT VFR BLD AUTO: 29.1 % (ref 40.5–52.5)
HGB BLD-MCNC: 9.7 G/DL (ref 13.5–17.5)
MCH RBC QN AUTO: 28.7 PG (ref 26–34)
MCHC RBC AUTO-ENTMCNC: 33.4 G/DL (ref 31–36)
MCV RBC AUTO: 86.1 FL (ref 80–100)
PHOSPHATE SERPL-MCNC: 4 MG/DL (ref 2.5–4.9)
PLATELET # BLD AUTO: 325 K/UL (ref 135–450)
PMV BLD AUTO: 8.4 FL (ref 5–10.5)
POTASSIUM SERPL-SCNC: 4.2 MMOL/L (ref 3.5–5.1)
RBC # BLD AUTO: 3.38 M/UL (ref 4.2–5.9)
SODIUM SERPL-SCNC: 132 MMOL/L (ref 136–145)
WBC # BLD AUTO: 8.3 K/UL (ref 4–11)

## 2024-02-29 PROCEDURE — 2580000003 HC RX 258: Performed by: STUDENT IN AN ORGANIZED HEALTH CARE EDUCATION/TRAINING PROGRAM

## 2024-02-29 PROCEDURE — 1200000000 HC SEMI PRIVATE

## 2024-02-29 PROCEDURE — 36415 COLL VENOUS BLD VENIPUNCTURE: CPT

## 2024-02-29 PROCEDURE — 85027 COMPLETE CBC AUTOMATED: CPT

## 2024-02-29 PROCEDURE — 6370000000 HC RX 637 (ALT 250 FOR IP): Performed by: INTERNAL MEDICINE

## 2024-02-29 PROCEDURE — 6360000002 HC RX W HCPCS: Performed by: INTERNAL MEDICINE

## 2024-02-29 PROCEDURE — 6370000000 HC RX 637 (ALT 250 FOR IP): Performed by: NURSE PRACTITIONER

## 2024-02-29 PROCEDURE — 80069 RENAL FUNCTION PANEL: CPT

## 2024-02-29 PROCEDURE — 6370000000 HC RX 637 (ALT 250 FOR IP): Performed by: STUDENT IN AN ORGANIZED HEALTH CARE EDUCATION/TRAINING PROGRAM

## 2024-02-29 RX ORDER — SODIUM CHLORIDE 0.9 % (FLUSH) 0.9 %
5-40 SYRINGE (ML) INJECTION EVERY 12 HOURS SCHEDULED
Status: CANCELLED | OUTPATIENT
Start: 2024-02-29

## 2024-02-29 RX ORDER — KETOROLAC TROMETHAMINE 30 MG/ML
30 INJECTION, SOLUTION INTRAMUSCULAR; INTRAVENOUS EVERY 6 HOURS PRN
Status: DISCONTINUED | OUTPATIENT
Start: 2024-02-29 | End: 2024-03-01

## 2024-02-29 RX ORDER — LIDOCAINE HYDROCHLORIDE 10 MG/ML
0.3 INJECTION, SOLUTION INFILTRATION; PERINEURAL
Status: CANCELLED | OUTPATIENT
Start: 2024-02-29 | End: 2024-03-01

## 2024-02-29 RX ORDER — CIPROFLOXACIN AND DEXAMETHASONE 3; 1 MG/ML; MG/ML
4 SUSPENSION/ DROPS AURICULAR (OTIC) 2 TIMES DAILY
Status: DISCONTINUED | OUTPATIENT
Start: 2024-02-29 | End: 2024-03-02 | Stop reason: HOSPADM

## 2024-02-29 RX ORDER — SODIUM CHLORIDE, SODIUM LACTATE, POTASSIUM CHLORIDE, CALCIUM CHLORIDE 600; 310; 30; 20 MG/100ML; MG/100ML; MG/100ML; MG/100ML
INJECTION, SOLUTION INTRAVENOUS CONTINUOUS
Status: CANCELLED | OUTPATIENT
Start: 2024-02-29

## 2024-02-29 RX ORDER — SODIUM CHLORIDE 0.9 % (FLUSH) 0.9 %
5-40 SYRINGE (ML) INJECTION PRN
Status: CANCELLED | OUTPATIENT
Start: 2024-02-29

## 2024-02-29 RX ORDER — HYDROMORPHONE HYDROCHLORIDE 1 MG/ML
0.5 INJECTION, SOLUTION INTRAMUSCULAR; INTRAVENOUS; SUBCUTANEOUS ONCE
Status: COMPLETED | OUTPATIENT
Start: 2024-02-29 | End: 2024-02-29

## 2024-02-29 RX ORDER — SODIUM CHLORIDE 9 MG/ML
INJECTION, SOLUTION INTRAVENOUS PRN
Status: CANCELLED | OUTPATIENT
Start: 2024-02-29

## 2024-02-29 RX ADMIN — Medication 10 ML: at 20:53

## 2024-02-29 RX ADMIN — HYDROMORPHONE HYDROCHLORIDE 0.5 MG: 1 INJECTION, SOLUTION INTRAMUSCULAR; INTRAVENOUS; SUBCUTANEOUS at 18:45

## 2024-02-29 RX ADMIN — CARVEDILOL 25 MG: 25 TABLET, FILM COATED ORAL at 16:01

## 2024-02-29 RX ADMIN — HYDRALAZINE HYDROCHLORIDE 100 MG: 50 TABLET ORAL at 14:23

## 2024-02-29 RX ADMIN — SEVELAMER CARBONATE 800 MG: 800 TABLET, FILM COATED ORAL at 07:54

## 2024-02-29 RX ADMIN — HYDRALAZINE HYDROCHLORIDE 100 MG: 50 TABLET ORAL at 20:52

## 2024-02-29 RX ADMIN — BENZONATATE 100 MG: 100 CAPSULE ORAL at 16:08

## 2024-02-29 RX ADMIN — PANTOPRAZOLE SODIUM 40 MG: 40 TABLET, DELAYED RELEASE ORAL at 07:54

## 2024-02-29 RX ADMIN — HYDROMORPHONE HYDROCHLORIDE 0.5 MG: 1 INJECTION, SOLUTION INTRAMUSCULAR; INTRAVENOUS; SUBCUTANEOUS at 16:01

## 2024-02-29 RX ADMIN — Medication 10 ML: at 07:54

## 2024-02-29 RX ADMIN — QUETIAPINE FUMARATE 200 MG: 100 TABLET ORAL at 20:53

## 2024-02-29 RX ADMIN — CIPROFLOXACIN AND DEXAMETHASONE 4 DROP: 3; 1 SUSPENSION/ DROPS AURICULAR (OTIC) at 20:52

## 2024-02-29 RX ADMIN — CARVEDILOL 25 MG: 25 TABLET, FILM COATED ORAL at 07:54

## 2024-02-29 RX ADMIN — SACUBITRIL AND VALSARTAN 1 TABLET: 24; 26 TABLET, FILM COATED ORAL at 07:54

## 2024-02-29 RX ADMIN — FLUTICASONE PROPIONATE 1 SPRAY: 50 SPRAY, METERED NASAL at 07:54

## 2024-02-29 RX ADMIN — ISOSORBIDE MONONITRATE 30 MG: 30 TABLET, EXTENDED RELEASE ORAL at 07:54

## 2024-02-29 RX ADMIN — HYDROMORPHONE HYDROCHLORIDE 0.5 MG: 1 INJECTION, SOLUTION INTRAMUSCULAR; INTRAVENOUS; SUBCUTANEOUS at 11:59

## 2024-02-29 RX ADMIN — ATORVASTATIN CALCIUM 40 MG: 40 TABLET, FILM COATED ORAL at 20:52

## 2024-02-29 RX ADMIN — HYDROMORPHONE HYDROCHLORIDE 0.5 MG: 1 INJECTION, SOLUTION INTRAMUSCULAR; INTRAVENOUS; SUBCUTANEOUS at 03:06

## 2024-02-29 RX ADMIN — ASPIRIN 81 MG 81 MG: 81 TABLET ORAL at 07:54

## 2024-02-29 RX ADMIN — CLOPIDOGREL BISULFATE 75 MG: 75 TABLET ORAL at 07:54

## 2024-02-29 RX ADMIN — SEVELAMER CARBONATE 800 MG: 800 TABLET, FILM COATED ORAL at 11:59

## 2024-02-29 RX ADMIN — CIPROFLOXACIN AND DEXAMETHASONE 4 DROP: 3; 1 SUSPENSION/ DROPS AURICULAR (OTIC) at 10:32

## 2024-02-29 RX ADMIN — HYDRALAZINE HYDROCHLORIDE 100 MG: 50 TABLET ORAL at 07:54

## 2024-02-29 RX ADMIN — ACETAMINOPHEN 650 MG: 325 TABLET ORAL at 10:32

## 2024-02-29 RX ADMIN — HYDROMORPHONE HYDROCHLORIDE 0.5 MG: 1 INJECTION, SOLUTION INTRAMUSCULAR; INTRAVENOUS; SUBCUTANEOUS at 07:54

## 2024-02-29 RX ADMIN — EZETIMIBE 10 MG: 10 TABLET ORAL at 07:54

## 2024-02-29 RX ADMIN — SACUBITRIL AND VALSARTAN 1 TABLET: 24; 26 TABLET, FILM COATED ORAL at 20:53

## 2024-02-29 RX ADMIN — DOXAZOSIN 2 MG: 2 TABLET ORAL at 20:53

## 2024-02-29 RX ADMIN — SEVELAMER CARBONATE 800 MG: 800 TABLET, FILM COATED ORAL at 16:01

## 2024-02-29 RX ADMIN — HYDROMORPHONE HYDROCHLORIDE 0.5 MG: 1 INJECTION, SOLUTION INTRAMUSCULAR; INTRAVENOUS; SUBCUTANEOUS at 20:52

## 2024-02-29 ASSESSMENT — PAIN DESCRIPTION - DESCRIPTORS
DESCRIPTORS: ACHING
DESCRIPTORS: ACHING
DESCRIPTORS: ACHING;PRESSURE
DESCRIPTORS: ACHING
DESCRIPTORS: ACHING;SORE
DESCRIPTORS: ACHING

## 2024-02-29 ASSESSMENT — PAIN DESCRIPTION - LOCATION
LOCATION: EAR;NECK
LOCATION: EAR;NECK
LOCATION: EAR;THROAT
LOCATION: EAR;CHEST
LOCATION: EAR;FACE
LOCATION: HEAD

## 2024-02-29 ASSESSMENT — PAIN - FUNCTIONAL ASSESSMENT
PAIN_FUNCTIONAL_ASSESSMENT: ACTIVITIES ARE NOT PREVENTED

## 2024-02-29 ASSESSMENT — PAIN SCALES - GENERAL
PAINLEVEL_OUTOF10: 10
PAINLEVEL_OUTOF10: 8
PAINLEVEL_OUTOF10: 9
PAINLEVEL_OUTOF10: 8

## 2024-02-29 ASSESSMENT — PAIN DESCRIPTION - PAIN TYPE: TYPE: ACUTE PAIN

## 2024-02-29 ASSESSMENT — PAIN DESCRIPTION - ORIENTATION
ORIENTATION: MID
ORIENTATION: LEFT
ORIENTATION: LEFT;MID
ORIENTATION: LEFT

## 2024-02-29 ASSESSMENT — PAIN DESCRIPTION - FREQUENCY: FREQUENCY: CONTINUOUS

## 2024-02-29 ASSESSMENT — PAIN DESCRIPTION - ONSET: ONSET: ON-GOING

## 2024-02-29 NOTE — PROGRESS NOTES
Nephrology Progress Note   [facility]      Sub/interval history  C/o left face/ear pain and difficulty swallowing  GI planning EGD    HD on 2/28 w no net UF w post wt of 58 kg  dt hypotension  HD on 2/26 w 3ll net UF, post wt 55 .5 kg     ROS: No fever/nausea/vomiting  PSFH: No visitor    Scheduled Meds:   ciprofloxacin-dexAMETHasone  4 drop Left Ear BID    clopidogrel  75 mg Oral Daily    doxazosin  2 mg Oral Nightly    ezetimibe  10 mg Oral Daily    hydrALAZINE  100 mg Oral TID    isosorbide mononitrate  30 mg Oral Daily    pantoprazole  40 mg Oral QAM AC    sacubitril-valsartan  1 tablet Oral BID    sevelamer  800 mg Oral TID WC    fluticasone  1 spray Each Nostril Daily    carvedilol  25 mg Oral BID WC    sodium chloride flush  5-40 mL IntraVENous 2 times per day    aspirin  81 mg Oral Daily    atorvastatin  40 mg Oral Nightly    QUEtiapine  200 mg Oral Nightly     Continuous Infusions:   sodium chloride       PRN Meds:.pseudoephedrine, promethazine, prochlorperazine, simethicone, perflutren lipid microspheres, nitroGLYCERIN, HYDROmorphone, sodium chloride flush, sodium chloride, ondansetron **OR** ondansetron, acetaminophen **OR** acetaminophen, benzonatate    Objective/     Vitals:    02/29/24 0306 02/29/24 0336 02/29/24 0445 02/29/24 0752   BP:   (!) 123/48 115/63   Pulse:   75 83   Resp: 18 16 18 20   Temp:    98.2 °F (36.8 °C)   TempSrc:    Oral   SpO2:   93% 94%   Weight:   58.5 kg (129 lb)    Height:         24HR INTAKE/OUTPUT:    Intake/Output Summary (Last 24 hours) at 2/29/2024 1010  Last data filed at 2/29/2024 0445  Gross per 24 hour   Intake 240 ml   Output 0 ml   Net 240 ml       Gen: alert, awake  Neck: No JVD  Skin: Unremarkable  Cardiovascular:  S1, S2 without m/r/g   Respiratory: decreased breath sounds, coarse   Abdomen:  soft, nt, nd,   Extremities: trace lower extremity edema  Neuro/Psy: AAoriented times 3 ; moves all 4 ext    Data/  Recent Labs     02/28/24  0640 02/29/24  0558

## 2024-02-29 NOTE — PLAN OF CARE
Problem: Discharge Planning  Goal: Discharge to home or other facility with appropriate resources  Outcome: Progressing  Flowsheets (Taken 2/29/2024 0753)  Discharge to home or other facility with appropriate resources: Identify barriers to discharge with patient and caregiver     Problem: Pain  Goal: Verbalizes/displays adequate comfort level or baseline comfort level  Outcome: Progressing     Problem: Safety - Adult  Goal: Free from fall injury  Outcome: Progressing     Problem: Nutrition Deficit:  Goal: Optimize nutritional status  Outcome: Progressing     Problem: Chronic Conditions and Co-morbidities  Goal: Patient's chronic conditions and co-morbidity symptoms are monitored and maintained or improved  Outcome: Progressing  Flowsheets (Taken 2/29/2024 0753)  Care Plan - Patient's Chronic Conditions and Co-Morbidity Symptoms are Monitored and Maintained or Improved: Monitor and assess patient's chronic conditions and comorbid symptoms for stability, deterioration, or improvement

## 2024-02-29 NOTE — PROGRESS NOTES
Hospital Medicine Progress Note      Date of Admission: 2/23/2024  Hospital Day: 7    Chief Admission Complaint:  \"Chest pain and URTI\"     Subjective:  c/o L ear pain    Presenting Admission History:         \"Heber Wheeler is a 53 y.o. male with hypertension, hyperlipidemia, ESRD on HD MWF, bipolar type I, T2DM, chronic hepatitis C, chronic back pain, marijuana abuse presented from home with chest pain and flulike symptoms.  Patient reports that he had a problem with his graft on Wednesday and was told to go to [facility], patient went to [facility] center and underwent some clearing up of clots, by the time he went back to HD they did not have a chair for him.  Thursday started having nasal/sinus congestion headache and nonproductive cough.  This morning started experiencing substernal chest discomfort, describes it as constant sharp nonradiating no aggravating or alleviating factors associated with mild shortness of breath.  During my evaluation patient was alert oriented x 3 hemodynamically stable, recently administered IV Dilaudid and reported no chest pain.  Denies fevers chills night sweats nausea vomiting diarrhea LOC dizziness\"     Assessment/Plan:      Current Principal Problem:  Chest pain       Chest pain - Concerning to ED for ACS but etiology clinically unable to determine, possibly 2nd to HTN.  Initial enzymes/EKG negative.  Follow serial enzymes, reviewed and documented, and monitor on tele - currently w/out evidence of ischemia/arrythmia.  Cardiology consulted and appreciated - stress done 28 Feb w/out evidence of ischemia - may be GI related w/ associated Nausea - GI consulted and appreciated, NPO for possible evaluation.     Troponin elevation - c/w myocardial injury 2nd to      [] STEMI/NSTEMI  [x] Trop leak 2nd to CKD/ESRD  [x] Chronic Myocardial Injury 2nd to CHF  [] Recent MI (with 4 weeks of admission) dated:     of unclear clinical significance w/out signs/sxs of active ischemia.   \"PROBNP\", \"TROPHS\" in the last 72 hours.    No results for input(s): \"LABA1C\" in the last 72 hours.    No results for input(s): \"AST\", \"ALT\", \"BILIDIR\", \"BILITOT\", \"ALKPHOS\" in the last 72 hours.    No results for input(s): \"INR\", \"LACTA\", \"TSH\" in the last 72 hours.      Urine Cultures:   Lab Results   Component Value Date/Time    LABURIN No growth at 18-36 hours 06/19/2018 04:15 PM    LABURIN 50 06/06/2013 01:49 PM     Blood Cultures:   Lab Results   Component Value Date/Time    BC No Growth after 4 days of incubation. 12/03/2023 04:55 PM     Lab Results   Component Value Date/Time    BLOODCULT2 No Growth after 4 days of incubation. 12/03/2023 05:21 PM     Organism:   Lab Results   Component Value Date/Time    ORG Prevotella buccae 11/28/2017 11:10 AM    ORG Prevotella oralis 11/28/2017 11:10 AM         Heber Mendiola MD

## 2024-02-29 NOTE — CARE COORDINATION
CM update; LOS # 4 :Following team IM, Cardiology, Nephrology and GI. Patient is scheduled for EGD tomorrow. Patient does HD at [facility] . No needs at this time. Will follow.Heather Orellana RN

## 2024-02-29 NOTE — CONSULTS
Consultation Note    Patient Name: Heber Wheeler  : 1970  Age: 53 y.o.     Admitting Physician: Heber Mendiola MD   Date of Admission: 2024  8:17 PM   Primary Care Physician: Carline Pugh        Heber Wheeler is being seen at the request of Heber Mendiola MD for chest pain, cardiac work up negative.    History of Present Illness:  53 year old M with PMH significant for anxiety, chronic back pain, GERD, CKD on HD, depression, hepatitis C, hypertension, PTSD, bipolar 1 disorder, narcotic/opiate abuse, and marijuana use. Previous abdominal surgeries include appendectomy.     Patient admitted to the hospital on  with complaints of chest pain. Noted to have elevated troponin. History of CAD s/p PCI on Plavix. NM stress test yesterday was normal. GI has been consulted to evaluated for atypical chest pain. He was seen by his PCP in December for reflux. He was started on omeprazole and referred to GI. He did not make an appointment with GI. He reports his reflux symptoms improved but noticed he had sulfuric belching after that. Denies any abdominal pain, nausea/vomiting, or changes in bowel habits. He is concerned about the pain he developed yesterday of  his left ear, down his neck and in his throat. He also reports trouble since onset of symptoms yesterday. Also noted to have a congested cough.   No prior history of EGD or colonoscopy. Last dose of Plavix this morning.     GI History:  None noted.      Past Medical History:  Past Medical History:   Diagnosis Date    Anxiety     Back pain     Chronic    Bipolar 1 disorder (HCC)     Chest pain     Secondary to GERD    Chronic kidney disease     CKD (chronic kidney disease)     DDD (degenerative disc disease), lumbar     + MRI - no narcotics from this office    Depression, endogenous (HCC)     suicide attempt 10/11, [facility]; psych-[facility]    Diabetes mellitus ([facility])     GERD (gastroesophageal reflux disease)     Hepatitis C  0105      Corporate ID       [id number]             NM Technician       Douglas Rome,                                                             [facility]      Nurse              Ochoa,        Interpreting        [facility] Hospital[facility]izz[facility] Trammell RN      Physician           Anna Casiano MD      Ordering Physician Ismael Welch MD      The procedure was explained in detail to the patient. Risks,   complications and alternative treatments were reviewed. Written consent   was obtained.     Procedure  Procedure Type:      Nuclear Stress Test:Pharmacological, Regadenoson, NM MYOCARDIAL SPECT REST   EXERCISE OR RX      Study location: [facility] - Nuclear Medicine      Indications: Chest pain.                 Hospital Status: Inpatient.     Height: 70 inches Weight: 130 pounds     Risk Factors      The patient risk factors include:prior PCI;hypertension, family history of   premature CAD, diabetes mellitus and dyslipidemia.      Conclusions      Summary   Normal myocardial perfusion study with normal left ventricular function,   size, and wall motion.   There is a mild inferior defect consistent with attenuation artifact. There   is no evidence for ischemia.   The estimated left ventricular function is 59%.     Stress Protocols      Resting ECG   Normal sinus rhythm.   Baseline ST changes.      Resting HR:67 bpm  Resting BP:120/40 mmHg     Stress Protocol:Pharmacologic - Lexiscan's     Peak HR:81 bpm                               HR/BP product:8667   Peak BP:107/34 mmHg   Predicted HR: 169 bpm   % of predicted HR: 48   Test duration: 1 min   Reason for termination:Completed      ECG Findings   There are baseline ST and T wave changes that make stress EKG   uninterpretable.      Arrhythmias   There is no arrhythmia noted.      Symptoms   Patient developed shortness of breath , likely related to lexiscan.   Symptoms resolved with rest.      Complications   Procedure complication was none.

## 2024-02-29 NOTE — PLAN OF CARE
Problem: Discharge Planning  Goal: Discharge to home or other facility with appropriate resources  2/28/2024 2304 by Kely Mcknight RN  Outcome: Progressing  2/28/2024 1346 by Agnes Quiros RN  Outcome: Progressing  Flowsheets (Taken 2/28/2024 0700)  Discharge to home or other facility with appropriate resources:   Identify barriers to discharge with patient and caregiver   Arrange for needed discharge resources and transportation as appropriate   Identify discharge learning needs (meds, wound care, etc)     Problem: Pain  Goal: Verbalizes/displays adequate comfort level or baseline comfort level  Outcome: Progressing     Problem: Safety - Adult  Goal: Free from fall injury  2/28/2024 2304 by Kely Mcknight RN  Outcome: Progressing  2/28/2024 1346 by Agnes Quiros RN  Outcome: Progressing     Problem: Nutrition Deficit:  Goal: Optimize nutritional status  Outcome: Progressing     Problem: Chronic Conditions and Co-morbidities  Goal: Patient's chronic conditions and co-morbidity symptoms are monitored and maintained or improved  2/28/2024 2304 by Kely Mcknight RN  Outcome: Progressing  2/28/2024 1346 by Agnes Quiros, RN  Outcome: Progressing  Flowsheets (Taken 2/28/2024 0700)  Care Plan - Patient's Chronic Conditions and Co-Morbidity Symptoms are Monitored and Maintained or Improved: Monitor and assess patient's chronic conditions and comorbid symptoms for stability, deterioration, or improvement

## 2024-03-01 ENCOUNTER — ANESTHESIA (OUTPATIENT)
Dept: ENDOSCOPY | Age: 54
End: 2024-03-01
Payer: MEDICAID

## 2024-03-01 ENCOUNTER — APPOINTMENT (OUTPATIENT)
Dept: CT IMAGING | Age: 54
DRG: 194 | End: 2024-03-01
Payer: MEDICAID

## 2024-03-01 LAB
ALBUMIN SERPL-MCNC: 3 G/DL (ref 3.4–5)
ANION GAP SERPL CALCULATED.3IONS-SCNC: 18 MMOL/L (ref 3–16)
BUN SERPL-MCNC: 47 MG/DL (ref 7–20)
CALCIUM SERPL-MCNC: 8.6 MG/DL (ref 8.3–10.6)
CHLORIDE SERPL-SCNC: 92 MMOL/L (ref 99–110)
CO2 SERPL-SCNC: 21 MMOL/L (ref 21–32)
CREAT SERPL-MCNC: 7.1 MG/DL (ref 0.9–1.3)
DEPRECATED RDW RBC AUTO: 14.7 % (ref 12.4–15.4)
GFR SERPLBLD CREATININE-BSD FMLA CKD-EPI: 9 ML/MIN/{1.73_M2}
GLUCOSE SERPL-MCNC: 172 MG/DL (ref 70–99)
HCT VFR BLD AUTO: 30.9 % (ref 40.5–52.5)
HGB BLD-MCNC: 10.3 G/DL (ref 13.5–17.5)
MCH RBC QN AUTO: 29.1 PG (ref 26–34)
MCHC RBC AUTO-ENTMCNC: 33.4 G/DL (ref 31–36)
MCV RBC AUTO: 87.3 FL (ref 80–100)
PHOSPHATE SERPL-MCNC: 4.2 MG/DL (ref 2.5–4.9)
PLATELET # BLD AUTO: 324 K/UL (ref 135–450)
PMV BLD AUTO: 8.1 FL (ref 5–10.5)
POTASSIUM SERPL-SCNC: 4.3 MMOL/L (ref 3.5–5.1)
RBC # BLD AUTO: 3.54 M/UL (ref 4.2–5.9)
SODIUM SERPL-SCNC: 131 MMOL/L (ref 136–145)
WBC # BLD AUTO: 9 K/UL (ref 4–11)

## 2024-03-01 PROCEDURE — 6360000002 HC RX W HCPCS: Performed by: NURSE ANESTHETIST, CERTIFIED REGISTERED

## 2024-03-01 PROCEDURE — 90935 HEMODIALYSIS ONE EVALUATION: CPT

## 2024-03-01 PROCEDURE — 70450 CT HEAD/BRAIN W/O DYE: CPT

## 2024-03-01 PROCEDURE — 80069 RENAL FUNCTION PANEL: CPT

## 2024-03-01 PROCEDURE — 1200000000 HC SEMI PRIVATE

## 2024-03-01 PROCEDURE — 2580000003 HC RX 258: Performed by: INTERNAL MEDICINE

## 2024-03-01 PROCEDURE — 2500000003 HC RX 250 WO HCPCS: Performed by: ANESTHESIOLOGY

## 2024-03-01 PROCEDURE — 0DB58ZX EXCISION OF ESOPHAGUS, VIA NATURAL OR ARTIFICIAL OPENING ENDOSCOPIC, DIAGNOSTIC: ICD-10-PCS | Performed by: INTERNAL MEDICINE

## 2024-03-01 PROCEDURE — 2580000003 HC RX 258: Performed by: STUDENT IN AN ORGANIZED HEALTH CARE EDUCATION/TRAINING PROGRAM

## 2024-03-01 PROCEDURE — 36415 COLL VENOUS BLD VENIPUNCTURE: CPT

## 2024-03-01 PROCEDURE — 2500000003 HC RX 250 WO HCPCS: Performed by: NURSE ANESTHETIST, CERTIFIED REGISTERED

## 2024-03-01 PROCEDURE — 0DB68ZX EXCISION OF STOMACH, VIA NATURAL OR ARTIFICIAL OPENING ENDOSCOPIC, DIAGNOSTIC: ICD-10-PCS | Performed by: INTERNAL MEDICINE

## 2024-03-01 PROCEDURE — 7100000011 HC PHASE II RECOVERY - ADDTL 15 MIN: Performed by: INTERNAL MEDICINE

## 2024-03-01 PROCEDURE — 6370000000 HC RX 637 (ALT 250 FOR IP): Performed by: INTERNAL MEDICINE

## 2024-03-01 PROCEDURE — 6360000002 HC RX W HCPCS: Performed by: INTERNAL MEDICINE

## 2024-03-01 PROCEDURE — 6370000000 HC RX 637 (ALT 250 FOR IP): Performed by: STUDENT IN AN ORGANIZED HEALTH CARE EDUCATION/TRAINING PROGRAM

## 2024-03-01 PROCEDURE — 2580000003 HC RX 258: Performed by: NURSE ANESTHETIST, CERTIFIED REGISTERED

## 2024-03-01 PROCEDURE — 85027 COMPLETE CBC AUTOMATED: CPT

## 2024-03-01 PROCEDURE — 7100000010 HC PHASE II RECOVERY - FIRST 15 MIN: Performed by: INTERNAL MEDICINE

## 2024-03-01 PROCEDURE — 6370000000 HC RX 637 (ALT 250 FOR IP): Performed by: NURSE PRACTITIONER

## 2024-03-01 PROCEDURE — 88305 TISSUE EXAM BY PATHOLOGIST: CPT

## 2024-03-01 PROCEDURE — 94640 AIRWAY INHALATION TREATMENT: CPT

## 2024-03-01 PROCEDURE — 3700000000 HC ANESTHESIA ATTENDED CARE: Performed by: INTERNAL MEDICINE

## 2024-03-01 PROCEDURE — 2709999900 HC NON-CHARGEABLE SUPPLY: Performed by: INTERNAL MEDICINE

## 2024-03-01 PROCEDURE — 3609012400 HC EGD TRANSORAL BIOPSY SINGLE/MULTIPLE: Performed by: INTERNAL MEDICINE

## 2024-03-01 PROCEDURE — 6370000000 HC RX 637 (ALT 250 FOR IP): Performed by: ANESTHESIOLOGY

## 2024-03-01 RX ORDER — IPRATROPIUM BROMIDE AND ALBUTEROL SULFATE 2.5; .5 MG/3ML; MG/3ML
1 SOLUTION RESPIRATORY (INHALATION) ONCE
Status: COMPLETED | OUTPATIENT
Start: 2024-03-01 | End: 2024-03-01

## 2024-03-01 RX ORDER — FAMOTIDINE 10 MG/ML
20 INJECTION, SOLUTION INTRAVENOUS ONCE
Status: COMPLETED | OUTPATIENT
Start: 2024-03-01 | End: 2024-03-01

## 2024-03-01 RX ORDER — SODIUM CHLORIDE, SODIUM LACTATE, POTASSIUM CHLORIDE, CALCIUM CHLORIDE 600; 310; 30; 20 MG/100ML; MG/100ML; MG/100ML; MG/100ML
INJECTION, SOLUTION INTRAVENOUS CONTINUOUS PRN
Status: DISCONTINUED | OUTPATIENT
Start: 2024-03-01 | End: 2024-03-01 | Stop reason: SDUPTHER

## 2024-03-01 RX ORDER — OXYCODONE HYDROCHLORIDE 5 MG/1
5 TABLET ORAL EVERY 4 HOURS PRN
Status: DISCONTINUED | OUTPATIENT
Start: 2024-03-01 | End: 2024-03-02 | Stop reason: HOSPADM

## 2024-03-01 RX ORDER — PROPOFOL 10 MG/ML
INJECTION, EMULSION INTRAVENOUS PRN
Status: DISCONTINUED | OUTPATIENT
Start: 2024-03-01 | End: 2024-03-01 | Stop reason: SDUPTHER

## 2024-03-01 RX ORDER — IPRATROPIUM BROMIDE AND ALBUTEROL SULFATE 2.5; .5 MG/3ML; MG/3ML
1 SOLUTION RESPIRATORY (INHALATION) 2 TIMES DAILY
Status: DISCONTINUED | OUTPATIENT
Start: 2024-03-01 | End: 2024-03-02

## 2024-03-01 RX ORDER — HYDROMORPHONE HYDROCHLORIDE 1 MG/ML
1 INJECTION, SOLUTION INTRAMUSCULAR; INTRAVENOUS; SUBCUTANEOUS
Status: DISCONTINUED | OUTPATIENT
Start: 2024-03-01 | End: 2024-03-02 | Stop reason: HOSPADM

## 2024-03-01 RX ORDER — FAMOTIDINE 10 MG/ML
20 INJECTION, SOLUTION INTRAVENOUS DAILY
Status: DISCONTINUED | OUTPATIENT
Start: 2024-03-02 | End: 2024-03-02 | Stop reason: HOSPADM

## 2024-03-01 RX ORDER — OXYCODONE HYDROCHLORIDE 5 MG/1
10 TABLET ORAL EVERY 4 HOURS PRN
Status: DISCONTINUED | OUTPATIENT
Start: 2024-03-01 | End: 2024-03-02 | Stop reason: HOSPADM

## 2024-03-01 RX ORDER — SODIUM CHLORIDE, SODIUM LACTATE, POTASSIUM CHLORIDE, CALCIUM CHLORIDE 600; 310; 30; 20 MG/100ML; MG/100ML; MG/100ML; MG/100ML
INJECTION, SOLUTION INTRAVENOUS CONTINUOUS
Status: DISCONTINUED | OUTPATIENT
Start: 2024-03-01 | End: 2024-03-01

## 2024-03-01 RX ORDER — LIDOCAINE HYDROCHLORIDE 20 MG/ML
INJECTION, SOLUTION EPIDURAL; INFILTRATION; INTRACAUDAL; PERINEURAL PRN
Status: DISCONTINUED | OUTPATIENT
Start: 2024-03-01 | End: 2024-03-01 | Stop reason: SDUPTHER

## 2024-03-01 RX ADMIN — CIPROFLOXACIN AND DEXAMETHASONE 4 DROP: 3; 1 SUSPENSION/ DROPS AURICULAR (OTIC) at 20:55

## 2024-03-01 RX ADMIN — OXYCODONE 10 MG: 5 TABLET ORAL at 12:57

## 2024-03-01 RX ADMIN — ISOSORBIDE MONONITRATE 30 MG: 30 TABLET, EXTENDED RELEASE ORAL at 11:32

## 2024-03-01 RX ADMIN — HYDROMORPHONE HYDROCHLORIDE 0.5 MG: 1 INJECTION, SOLUTION INTRAMUSCULAR; INTRAVENOUS; SUBCUTANEOUS at 06:10

## 2024-03-01 RX ADMIN — HYDROMORPHONE HYDROCHLORIDE 1 MG: 1 INJECTION, SOLUTION INTRAMUSCULAR; INTRAVENOUS; SUBCUTANEOUS at 18:49

## 2024-03-01 RX ADMIN — SACUBITRIL AND VALSARTAN 1 TABLET: 24; 26 TABLET, FILM COATED ORAL at 11:32

## 2024-03-01 RX ADMIN — OXYCODONE 10 MG: 5 TABLET ORAL at 17:40

## 2024-03-01 RX ADMIN — CARVEDILOL 25 MG: 25 TABLET, FILM COATED ORAL at 11:32

## 2024-03-01 RX ADMIN — QUETIAPINE FUMARATE 200 MG: 100 TABLET ORAL at 20:54

## 2024-03-01 RX ADMIN — HYDROMORPHONE HYDROCHLORIDE 0.5 MG: 1 INJECTION, SOLUTION INTRAMUSCULAR; INTRAVENOUS; SUBCUTANEOUS at 10:02

## 2024-03-01 RX ADMIN — SEVELAMER CARBONATE 800 MG: 800 TABLET, FILM COATED ORAL at 11:32

## 2024-03-01 RX ADMIN — Medication 10 ML: at 11:33

## 2024-03-01 RX ADMIN — Medication 10 ML: at 20:55

## 2024-03-01 RX ADMIN — CARVEDILOL 25 MG: 25 TABLET, FILM COATED ORAL at 17:39

## 2024-03-01 RX ADMIN — ATORVASTATIN CALCIUM 40 MG: 40 TABLET, FILM COATED ORAL at 20:54

## 2024-03-01 RX ADMIN — SACUBITRIL AND VALSARTAN 1 TABLET: 24; 26 TABLET, FILM COATED ORAL at 20:54

## 2024-03-01 RX ADMIN — FAMOTIDINE 20 MG: 10 INJECTION, SOLUTION INTRAVENOUS at 14:35

## 2024-03-01 RX ADMIN — SODIUM CHLORIDE, SODIUM LACTATE, POTASSIUM CHLORIDE, AND CALCIUM CHLORIDE: .6; .31; .03; .02 INJECTION, SOLUTION INTRAVENOUS at 14:46

## 2024-03-01 RX ADMIN — IPRATROPIUM BROMIDE AND ALBUTEROL SULFATE 1 DOSE: 2.5; .5 SOLUTION RESPIRATORY (INHALATION) at 19:59

## 2024-03-01 RX ADMIN — LIDOCAINE HYDROCHLORIDE 60 MG: 20 INJECTION, SOLUTION EPIDURAL; INFILTRATION; INTRACAUDAL; PERINEURAL at 14:51

## 2024-03-01 RX ADMIN — PANTOPRAZOLE SODIUM 40 MG: 40 TABLET, DELAYED RELEASE ORAL at 06:11

## 2024-03-01 RX ADMIN — CIPROFLOXACIN AND DEXAMETHASONE 4 DROP: 3; 1 SUSPENSION/ DROPS AURICULAR (OTIC) at 10:02

## 2024-03-01 RX ADMIN — HYDROMORPHONE HYDROCHLORIDE 0.5 MG: 1 INJECTION, SOLUTION INTRAMUSCULAR; INTRAVENOUS; SUBCUTANEOUS at 02:06

## 2024-03-01 RX ADMIN — EZETIMIBE 10 MG: 10 TABLET ORAL at 11:32

## 2024-03-01 RX ADMIN — PROPOFOL 100 MG: 10 INJECTION, EMULSION INTRAVENOUS at 14:51

## 2024-03-01 RX ADMIN — FLUTICASONE PROPIONATE 1 SPRAY: 50 SPRAY, METERED NASAL at 11:33

## 2024-03-01 RX ADMIN — HYDRALAZINE HYDROCHLORIDE 100 MG: 50 TABLET ORAL at 20:54

## 2024-03-01 RX ADMIN — SODIUM CHLORIDE, POTASSIUM CHLORIDE, SODIUM LACTATE AND CALCIUM CHLORIDE: 600; 310; 30; 20 INJECTION, SOLUTION INTRAVENOUS at 14:38

## 2024-03-01 RX ADMIN — HYDRALAZINE HYDROCHLORIDE 100 MG: 50 TABLET ORAL at 11:31

## 2024-03-01 RX ADMIN — IPRATROPIUM BROMIDE AND ALBUTEROL SULFATE 1 DOSE: 2.5; .5 SOLUTION RESPIRATORY (INHALATION) at 14:36

## 2024-03-01 RX ADMIN — DOXAZOSIN 2 MG: 2 TABLET ORAL at 20:54

## 2024-03-01 RX ADMIN — SEVELAMER CARBONATE 800 MG: 800 TABLET, FILM COATED ORAL at 17:39

## 2024-03-01 RX ADMIN — ASPIRIN 81 MG 81 MG: 81 TABLET ORAL at 11:32

## 2024-03-01 ASSESSMENT — PAIN DESCRIPTION - ORIENTATION
ORIENTATION: LEFT
ORIENTATION: MID

## 2024-03-01 ASSESSMENT — PAIN SCALES - GENERAL
PAINLEVEL_OUTOF10: 8
PAINLEVEL_OUTOF10: 10
PAINLEVEL_OUTOF10: 10
PAINLEVEL_OUTOF10: 0
PAINLEVEL_OUTOF10: 0
PAINLEVEL_OUTOF10: 8
PAINLEVEL_OUTOF10: 10

## 2024-03-01 ASSESSMENT — PAIN DESCRIPTION - DESCRIPTORS
DESCRIPTORS: ACHING
DESCRIPTORS: ACHING

## 2024-03-01 ASSESSMENT — LIFESTYLE VARIABLES: SMOKING_STATUS: 1

## 2024-03-01 ASSESSMENT — PAIN DESCRIPTION - LOCATION
LOCATION: HEAD
LOCATION: EAR;THROAT

## 2024-03-01 ASSESSMENT — PAIN - FUNCTIONAL ASSESSMENT: PAIN_FUNCTIONAL_ASSESSMENT: 0-10

## 2024-03-01 ASSESSMENT — COPD QUESTIONNAIRES: CAT_SEVERITY: SEVERE

## 2024-03-01 NOTE — ANESTHESIA PRE PROCEDURE
Department of Anesthesiology  Preprocedure Note       Name:  Heber Wheeler   Age:  53 y.o.  :  1970                                          MRN:  [id number]         Date:  3/1/2024      Surgeon: Surgeon(s):  Festus Waters MD    Procedure: Procedure(s):  ESOPHAGOGASTRODUODENOSCOPY DIAGNOSTIC ONLY    Medications prior to admission:   Prior to Admission medications    Medication Sig Start Date End Date Taking? Authorizing Provider   sacubitril-valsartan (ENTRESTO) 24-26 MG per tablet Take 1 tablet by mouth 2 times daily   Yes Marcelle Estrada MD   omeprazole (PRILOSEC) 20 MG delayed release capsule Take 2 capsules by mouth daily   Yes Marcelle Estrada MD   Multiple Vitamins-Minerals (THERAPEUTIC MULTIVITAMIN-MINERALS) tablet Take 1 tablet by mouth daily  Patient not taking: Reported on 2024    Marcelle Estrada MD   doxazosin (CARDURA) 2 MG tablet Take 1 tablet by mouth nightly    Marcelle Estrada MD   QUEtiapine (SEROQUEL) 200 MG tablet Take 1 tablet by mouth nightly    ProviderMarcelle MD   sevelamer (RENVELA) 800 MG tablet Take 1 tablet by mouth 3 times daily (with meals)    Marcelle Estrada MD   pantoprazole (PROTONIX) 20 MG tablet Take 2 tablets by mouth in the morning and at bedtime 23  Agustín Hurt DO   atorvastatin (LIPITOR) 80 MG tablet Take 1 tablet by mouth nightly  Patient not taking: Reported on 2023   Marcelle Estrada MD   B Complex-C-Folic Acid (DIALYVITE TABLET) TABS  23   Marcelle Estrada MD   carvedilol (COREG) 12.5 MG tablet Take 1 tablet by mouth 2 times daily (with meals) 8/15/23   Marcelle Estrada MD   clopidogrel (PLAVIX) 75 MG tablet Take 1 tablet by mouth daily 23   Marcelle Estrada MD   ezetimibe (ZETIA) 10 MG tablet Take 1 tablet by mouth daily 22   Marcelle Estrada MD   famotidine (PEPCID) 20 MG tablet Take 1 tablet by mouth every other day 23   Marcelle Estrada MD

## 2024-03-01 NOTE — PROGRESS NOTES
Treatment time: 3 hours  Net UF: 0 ml     Pre weight: 80.9 kg  Post weight:80.9 kg    Access used: RTDC    Access function: WELL with  ml/min     Medications or blood products given: N/A     Regular outpatient schedule: mwf     Summary of response to treatment: Patient tolerated treatment well and without any complications. Patient remained stable throughout entire treatment and upon the exiting the dialysis suite via transport.     Report given to MUKUND carmona and copy of dialysis treatment record faxed to TASHA, to be scanned into EMR.

## 2024-03-01 NOTE — PROGRESS NOTES
Neph[facility] Progress Note   [facility]      Sub/interval history  Seen at EGD suite    HD on 3/1 w no net UF   HD on 2/28 w no net UF w post wt of 58 kg  dt hypotension  HD on 2/26 w 3ll net UF, post wt 55 .5 kg     ROS: No fever/nausea/vomiting  PSFH: No visitor    Scheduled Meds:   ipratropium 0.5 mg-albuterol 2.5 mg  1 Dose Inhalation BID    [START ON 3/2/2024] famotidine (PEPCID) injection  20 mg IntraVENous Daily    ciprofloxacin-dexAMETHasone  4 drop Left Ear BID    clopidogrel  75 mg Oral Daily    doxazosin  2 mg Oral Nightly    ezetimibe  10 mg Oral Daily    hydrALAZINE  100 mg Oral TID    isosorbide mononitrate  30 mg Oral Daily    pantoprazole  40 mg Oral QAM AC    sacubitril-valsartan  1 tablet Oral BID    sevelamer  800 mg Oral TID WC    fluticasone  1 spray Each Nostril Daily    carvedilol  25 mg Oral BID WC    sodium chloride flush  5-40 mL IntraVENous 2 times per day    aspirin  81 mg Oral Daily    atorvastatin  40 mg Oral Nightly    QUEtiapine  200 mg Oral Nightly     Continuous Infusions:   sodium chloride       PRN Meds:.oxyCODONE **OR** oxyCODONE, pseudoephedrine, promethazine, prochlorperazine, simethicone, perflutren lipid microspheres, nitroGLYCERIN, sodium chloride flush, sodium chloride, ondansetron **OR** ondansetron, acetaminophen **OR** acetaminophen, benzonatate    Objective/     Vitals:    03/01/24 1127 03/01/24 1411 03/01/24 1500 03/01/24 1510   BP: (!) 178/64 (!) 147/52 (!) 128/42 (!) 148/60   Pulse: 77 80 92 74   Resp: 18 16 16 16   Temp: 98 °F (36.7 °C) 99.3 °F (37.4 °C)     TempSrc: Oral Oral     SpO2: 92% 92% 96% 94%   Weight:       Height:         24HR INTAKE/OUTPUT:    Intake/Output Summary (Last 24 hours) at 3/1/2024 1516  Last data filed at 3/1/2024 1105  Gross per 24 hour   Intake 740 ml   Output 1500 ml   Net -760 ml       Gen: alert, awake  Neck: No JVD  Skin: Unremarkable  Cardiovascular:  S1, S2 without m/r/g   Respiratory: decreased breath sounds, coarse  noted.       Plan/   -HD  per  MWF schedule; his OP TW was 63.5 kg which has been dropped to 58 kg this admission  -defer face/ear pain eval to hospital medicine  -cont entresto  - renal dose meds    Chinmay Coker MD  Office: [phone]  Fax:    [phone]  [facility]

## 2024-03-01 NOTE — OP NOTE
[address],  [address]  [place] [address]  Phone: [phone]   Fax:[phone]  [address] [place] [address] ,  [address]  [place] [address]  Phone: [phone]   Fax:[phone]    EGD Procedure Note    Patient: Heber Wheeler  : 1970    Procedure: Esophagogastroduodenoscopy with biopsy    Date:  3/1/2024     Endoscopist:  Festus Waters MD    Referring Physician:  Carline Pugh    Preoperative Diagnosis:  Chest pain [R07.9]    Postoperative Diagnosis:  suspected gastroparesis, gastritis     Anesthesia: Anesthesia: MAC  Sedation: Propofol per anesthesia  Start Time: 14:49  Stop Time: 14:54  ASA Class: 2  Mallampati: II (soft palate, uvula, fauces visible)    Indications: This is a 53 y.o. year old male with medical history of CAD status post PCI on Plavix, GERD, diabetes mellitus type 2, CKD on hemodialysis, chronic hepatitis C virus infection, hypertension, PTSD, bipolar disorder, polysubstance abuse with opioid use and cannabis use admitted for chest pain of 1 week duration     Procedure Details  Informed consent was obtained for the procedure, including conscious sedation. Risks of pancreatitis, infection, perforation, hemorrhage, adverse drug reaction and aspiration were discussed. The patient was placed in the left lateral decubitus position.  Based on the pre-procedure assessment, including review of the patient's medical history, medications, allergies, and review of systems, he had been deemed to be an appropriate candidate for the above IV sedation; he was therefore sedated with the medications listed above.  He was monitored continuously with ECG tracing, pulse oximetry, blood pressure monitoring, and direct observation. A gastroscope was inserted into the mouth and advanced under direct vision to second portion of the duodenum.  A careful inspection was made as the gastroscope was withdrawn, including a retroflexed view of the proximal stomach including views of the incisura

## 2024-03-01 NOTE — PROGRESS NOTES
Report given to Maria Eugenia on A1 / pt meets criteria for discharge out of pacu.  Awaiting transport.

## 2024-03-01 NOTE — PROGRESS NOTES
Hospital Medicine Progress Note      Date of Admission: 2/23/2024  Hospital Day: 8    Chief Admission Complaint:  \"Chest pain and URTI\"     Subjective:  c/o L ear pain    Presenting Admission History:         \"Heber Wheeler is a 53 y.o. male with hypertension, hyperlipidemia, ESRD on HD MWF, bipolar type I, T2DM, chronic hepatitis C, chronic back pain, marijuana abuse presented from home with chest pain and flulike symptoms.  Patient reports that he had a problem with his graft on Wednesday and was told to go to [facility], patient went to [facility] center and underwent some clearing up of clots, by the time he went back to HD they did not have a chair for him.  Thursday started having nasal/sinus congestion headache and nonproductive cough.  This morning started experiencing substernal chest discomfort, describes it as constant sharp nonradiating no aggravating or alleviating factors associated with mild shortness of breath.  During my evaluation patient was alert oriented x 3 hemodynamically stable, recently administered IV Dilaudid and reported no chest pain.  Denies fevers chills night sweats nausea vomiting diarrhea LOC dizziness\"     Assessment/Plan:      Current Principal Problem:  Chest pain       Chest pain - Concerning to ED for ACS but etiology clinically unable to determine, possibly 2nd to HTN.  Initial enzymes/EKG negative.  Follow serial enzymes, reviewed and documented, and monitor on tele - currently w/out evidence of ischemia/arrythmia.  Cardiology consulted and appreciated - stress done 28 Feb w/out evidence of ischemia - may be GI related w/ associated Nausea - GI consulted and appreciated, EGD tentatively - planned 1 March.     Troponin elevation - c/w myocardial injury 2nd to      [] STEMI/NSTEMI  [x] Trop leak 2nd to CKD/ESRD  [x] Chronic Myocardial Injury 2nd to CHF  [] Recent MI (with 4 weeks of admission) dated:     of unclear clinical significance w/out signs/sxs of active ischemia.   47*   CREATININE 8.0* 5.6* 7.1*   CALCIUM 8.7 8.3 8.6   PHOS 7.7* 4.0 4.2       No results for input(s): \"PROBNP\", \"TROPHS\" in the last 72 hours.    No results for input(s): \"LABA1C\" in the last 72 hours.    No results for input(s): \"AST\", \"ALT\", \"BILIDIR\", \"BILITOT\", \"ALKPHOS\" in the last 72 hours.    No results for input(s): \"INR\", \"LACTA\", \"TSH\" in the last 72 hours.      Urine Cultures:   Lab Results   Component Value Date/Time    LABURIN No growth at 18-36 hours 06/19/2018 04:15 PM    LABURIN 50 06/06/2013 01:49 PM     Blood Cultures:   Lab Results   Component Value Date/Time    BC No Growth after 4 days of incubation. 12/03/2023 04:55 PM     Lab Results   Component Value Date/Time    BLOODCULT2 No Growth after 4 days of incubation. 12/03/2023 05:21 PM     Organism:   Lab Results   Component Value Date/Time    ORG Prevotella buccae 11/28/2017 11:10 AM    ORG Prevotella oralis 11/28/2017 11:10 AM         Heber Mendiola MD

## 2024-03-01 NOTE — PROGRESS NOTES
Pharmacy Note - Renal dose adjustment made per P/T protocol    Original order:  Pepcid 20 mg bid    Estimated Creatinine Clearance: 10 mL/min (A) (based on SCr of 7.1 mg/dL (HH)).    Recent Labs     02/28/24  0640 02/29/24  0558 03/01/24  0611   BUN 67* 37* 47*   CREATININE 8.0* 5.6* 7.1*       Renally adjusted order:  Pepcid 20 mg daily    Please call pharmacy with any questions.    Thank you,  Colleen Perez RPH  3/1/2024 2:17 PM

## 2024-03-01 NOTE — RT PROTOCOL NOTE
RT Nebulizer Bronchodilator Protocol Note    There is a bronchodilator order in the chart from a provider indicating to follow the RT Bronchodilator Protocol and there is an “Initiate RT Bronchodilator Protocol” order as well (see protocol at bottom of note).    CXR Findings:  No results found.    The findings from the last RT Protocol Assessment were as follows:  Smoking: Smoker 15 pack years or more  Respiratory Pattern: Dyspnea on exertion or RR 21-25 bpm  Breath Sounds: Slightly diminished and/or crackles  Cough: Strong, spontaneous, non-productive  Indication for Bronchodilator Therapy: Decreased or absent breath sounds  Bronchodilator Assessment Score: 5    Aerosolized bronchodilator medication orders have been revised according to the RT Nebulizer Bronchodilator Protocol below.    Respiratory Therapist to perform RT Therapy Protocol Assessment initially then follow the protocol.  Repeat RT Therapy Protocol Assessment PRN for score 0-3 or on second treatment, BID, and PRN for scores above 3.    No Indications - adjust the frequency to every 6 hours PRN wheezing or bronchospasm, if no treatments needed after 48 hours then discontinue using Per Protocol order mode.     If indication present, adjust the RT bronchodilator orders based on the Bronchodilator Assessment Score as indicated below.  If a patient is on this medication at home then do not decrease Frequency below that used at home.    0-3 - enter or revise RT bronchodilator order(s) to equivalent RT Bronchodilator order with Frequency of every 4 hours PRN for wheezing or increased work of breathing using Per Protocol order mode.       4-6 - enter or revise RT Bronchodilator order(s) to two equivalent RT bronchodilator orders with one order with BID Frequency and one order with Frequency of every 4 hours PRN wheezing or increased work of breathing using Per Protocol order mode.         7-10 - enter or revise RT Bronchodilator order(s) to two equivalent RT

## 2024-03-01 NOTE — ANESTHESIA PRE PROCEDURE
Department of Anesthesiology  Preprocedure Note       Name:  Heber Wheeler   Age:  53 y.o.  :  1970                                          MRN:  [id number]         Date:  3/1/2024      Surgeon: Surgeon(s):  Festus Waters MD    Procedure: Procedure(s):  ESOPHAGOGASTRODUODENOSCOPY DIAGNOSTIC ONLY    Medications prior to admission:   Prior to Admission medications    Medication Sig Start Date End Date Taking? Authorizing Provider   sacubitril-valsartan (ENTRESTO) 24-26 MG per tablet Take 1 tablet by mouth 2 times daily   Yes Marcelle Estrada MD   omeprazole (PRILOSEC) 20 MG delayed release capsule Take 2 capsules by mouth daily   Yes Marcelle Estrada MD   Multiple Vitamins-Minerals (THERAPEUTIC MULTIVITAMIN-MINERALS) tablet Take 1 tablet by mouth daily  Patient not taking: Reported on 2024    Marcelle Estrada MD   doxazosin (CARDURA) 2 MG tablet Take 1 tablet by mouth nightly    Marcelle Estrada MD   QUEtiapine (SEROQUEL) 200 MG tablet Take 1 tablet by mouth nightly    ProviderMarcelle MD   sevelamer (RENVELA) 800 MG tablet Take 1 tablet by mouth 3 times daily (with meals)    Marcelle Estrada MD   pantoprazole (PROTONIX) 20 MG tablet Take 2 tablets by mouth in the morning and at bedtime 23  Agustín Hurt DO   atorvastatin (LIPITOR) 80 MG tablet Take 1 tablet by mouth nightly  Patient not taking: Reported on 2023   Marcelle Estrada MD   B Complex-C-Folic Acid (DIALYVITE TABLET) TABS  23   Marcelle Estrada MD   carvedilol (COREG) 12.5 MG tablet Take 1 tablet by mouth 2 times daily (with meals) 8/15/23   Marcelle Estrada MD   clopidogrel (PLAVIX) 75 MG tablet Take 1 tablet by mouth daily 23   Marcelle Estrada MD   ezetimibe (ZETIA) 10 MG tablet Take 1 tablet by mouth daily 22   Marcelle Estrada MD   famotidine (PEPCID) 20 MG tablet Take 1 tablet by mouth every other day 23   Marcelle Estrada MD

## 2024-03-01 NOTE — PROGRESS NOTES
Comprehensive Nutrition Assessment    Type and Reason for Visit:  Reassess    Nutrition Recommendations/Plan:   NPO- monitor ability to resume regular diet after procedure today  Continue Nepro BID when PO resumes  Recommend initiating bowel regimen, no recent BM documented  Monitor po intakes, nutrition adequacy, weights, pertinent labs, BMs     Malnutrition Assessment:  Malnutrition Status:  At risk for malnutrition (Comment) (02/24/24 1510)      Nutrition Assessment:    Follow up. Patient off unit this am for HD and plans for endo for EGD afterwards r/t left ear and face pain as well as difficulty swallowing per MD note. Pt has minimal PO intake recorded as well as no BM this admit. Regular diet with renal ONS had been ordered. RD will monitor resumption of PO diet, tolerance, ONS acceptance, need for bowel regimen.    Nutrition Related Findings:    Na+ 131, , A1C 5.2, no BM Wound Type: None       Current Nutrition Intake & Therapies:    Average Meal Intake: NPO  Average Supplements Intake: NPO  Diet NPO Exceptions are: Sips of Water with Meds    Anthropometric Measures:  Height: 177.8 cm (5' 10\")  Ideal Body Weight (IBW): 166 lbs (75 kg)    Current Body Weight: 59.6 kg (131 lb 6.3 oz),   IBW. Weight Source: Stated  Current BMI (kg/m2): 18.9  BMI Categories: Normal Weight (BMI 18.5-24.9)    Estimated Daily Nutrient Needs:  Energy Requirements Based On: Kcal/kg  Weight Used for Energy Requirements: Current  Energy (kcal/day): 8708-0014  Weight Used for Protein Requirements: Current  Protein (g/day): 64-76  Method Used for Fluid Requirements: 1 ml/kcal  Fluid (ml/day): 1216-7979    Nutrition Diagnosis:   Inadequate oral intake related to acute injury/trauma as evidenced by intake 0-25%, BMI, poor intake prior to admission    Nutrition Interventions:   Food and/or Nutrient Delivery: Continue NPO  Nutrition Education/Counseling: No recommendation at this time  Coordination of Nutrition Care: Continue to monitor

## 2024-03-01 NOTE — PROGRESS NOTES
Patient returned to room from EGD. VSS. Pt reports being hungry, dietary called, denies other needs. Will continue to monitor.

## 2024-03-01 NOTE — ANESTHESIA POSTPROCEDURE EVALUATION
Department of Anesthesiology  Postprocedure Note    Patient: Heber Wheeler  MRN: [id number]  YOB: 1970  Date of evaluation: 3/1/2024    Procedure Summary       Date: 03/01/24 Room / Location: [place] [address] / [facility]    Anesthesia Start: 1446 Anesthesia Stop: 1459    Procedure: ESOPHAGOGASTRODUODENOSCOPY BIOPSY Diagnosis:       Chest pain, unspecified type      (Chest pain, unspecified type [R07.9])    Surgeons: Festus Waters MD Responsible Provider: Joe Riley MD    Anesthesia Type: MAC ASA Status: 4            Anesthesia Type: MAC    Bailey Phase I: Bailey Score: 10    Bailey Phase II: Bailey Score: 7    Anesthesia Post Evaluation    Patient location during evaluation: PACU  Level of consciousness: awake  Airway patency: patent  Nausea & Vomiting: no vomiting  Cardiovascular status: blood pressure returned to baseline  Respiratory status: acceptable  Hydration status: stable  Pain management: adequate    No notable events documented.

## 2024-03-01 NOTE — PLAN OF CARE
Problem: Discharge Planning  Goal: Discharge to home or other facility with appropriate resources  3/1/2024 0022 by Kely Mcknight RN  Outcome: Progressing  2/29/2024 1407 by Shikha Francis RN  Outcome: Progressing  Flowsheets (Taken 2/29/2024 0753)  Discharge to home or other facility with appropriate resources: Identify barriers to discharge with patient and caregiver     Problem: Pain  Goal: Verbalizes/displays adequate comfort level or baseline comfort level  3/1/2024 0022 by Kely Mcknight RN  Outcome: Progressing  2/29/2024 1407 by Shikha Francis RN  Outcome: Progressing     Problem: Safety - Adult  Goal: Free from fall injury  3/1/2024 0022 by Kely Mcknight RN  Outcome: Progressing  2/29/2024 1407 by Shikha Francis RN  Outcome: Progressing     Problem: Nutrition Deficit:  Goal: Optimize nutritional status  3/1/2024 0022 by Kely Mcknight RN  Outcome: Progressing  2/29/2024 1407 by Shikha Francis RN  Outcome: Progressing     Problem: Chronic Conditions and Co-morbidities  Goal: Patient's chronic conditions and co-morbidity symptoms are monitored and maintained or improved  3/1/2024 0022 by Kely Mcknight RN  Outcome: Progressing  2/29/2024 1407 by Shikha Francis RN  Outcome: Progressing  Flowsheets (Taken 2/29/2024 0753)  Care Plan - Patient's Chronic Conditions and Co-Morbidity Symptoms are Monitored and Maintained or Improved: Monitor and assess patient's chronic conditions and comorbid symptoms for stability, deterioration, or improvement

## 2024-03-02 VITALS
TEMPERATURE: 97 F | BODY MASS INDEX: 18.32 KG/M2 | DIASTOLIC BLOOD PRESSURE: 70 MMHG | WEIGHT: 128 LBS | HEART RATE: 77 BPM | HEIGHT: 70 IN | SYSTOLIC BLOOD PRESSURE: 154 MMHG | OXYGEN SATURATION: 93 % | RESPIRATION RATE: 16 BRPM

## 2024-03-02 LAB
ALBUMIN SERPL-MCNC: 3.3 G/DL (ref 3.4–5)
ANION GAP SERPL CALCULATED.3IONS-SCNC: 13 MMOL/L (ref 3–16)
BUN SERPL-MCNC: 25 MG/DL (ref 7–20)
CALCIUM SERPL-MCNC: 8.9 MG/DL (ref 8.3–10.6)
CHLORIDE SERPL-SCNC: 95 MMOL/L (ref 99–110)
CO2 SERPL-SCNC: 24 MMOL/L (ref 21–32)
CREAT SERPL-MCNC: 5.2 MG/DL (ref 0.9–1.3)
DEPRECATED RDW RBC AUTO: 14.6 % (ref 12.4–15.4)
GFR SERPLBLD CREATININE-BSD FMLA CKD-EPI: 13 ML/MIN/{1.73_M2}
GLUCOSE SERPL-MCNC: 166 MG/DL (ref 70–99)
HCT VFR BLD AUTO: 29.4 % (ref 40.5–52.5)
HGB BLD-MCNC: 9.8 G/DL (ref 13.5–17.5)
MCH RBC QN AUTO: 28.9 PG (ref 26–34)
MCHC RBC AUTO-ENTMCNC: 33.5 G/DL (ref 31–36)
MCV RBC AUTO: 86.3 FL (ref 80–100)
PHOSPHATE SERPL-MCNC: 3.4 MG/DL (ref 2.5–4.9)
PLATELET # BLD AUTO: 353 K/UL (ref 135–450)
PMV BLD AUTO: 7.9 FL (ref 5–10.5)
POTASSIUM SERPL-SCNC: 4.5 MMOL/L (ref 3.5–5.1)
RBC # BLD AUTO: 3.4 M/UL (ref 4.2–5.9)
SODIUM SERPL-SCNC: 132 MMOL/L (ref 136–145)
WBC # BLD AUTO: 9.9 K/UL (ref 4–11)

## 2024-03-02 PROCEDURE — 36415 COLL VENOUS BLD VENIPUNCTURE: CPT

## 2024-03-02 PROCEDURE — 6370000000 HC RX 637 (ALT 250 FOR IP): Performed by: INTERNAL MEDICINE

## 2024-03-02 PROCEDURE — 85027 COMPLETE CBC AUTOMATED: CPT

## 2024-03-02 PROCEDURE — 2500000003 HC RX 250 WO HCPCS: Performed by: ANESTHESIOLOGY

## 2024-03-02 PROCEDURE — 6370000000 HC RX 637 (ALT 250 FOR IP): Performed by: STUDENT IN AN ORGANIZED HEALTH CARE EDUCATION/TRAINING PROGRAM

## 2024-03-02 PROCEDURE — 80069 RENAL FUNCTION PANEL: CPT

## 2024-03-02 PROCEDURE — 6360000002 HC RX W HCPCS: Performed by: INTERNAL MEDICINE

## 2024-03-02 PROCEDURE — 2580000003 HC RX 258: Performed by: STUDENT IN AN ORGANIZED HEALTH CARE EDUCATION/TRAINING PROGRAM

## 2024-03-02 RX ORDER — CARVEDILOL 25 MG/1
25 TABLET ORAL 2 TIMES DAILY WITH MEALS
Qty: 60 TABLET | Refills: 3 | Status: SHIPPED | OUTPATIENT
Start: 2024-03-02

## 2024-03-02 RX ORDER — IPRATROPIUM BROMIDE AND ALBUTEROL SULFATE 2.5; .5 MG/3ML; MG/3ML
1 SOLUTION RESPIRATORY (INHALATION) EVERY 6 HOURS PRN
Status: DISCONTINUED | OUTPATIENT
Start: 2024-03-02 | End: 2024-03-02 | Stop reason: HOSPADM

## 2024-03-02 RX ORDER — ATORVASTATIN CALCIUM 40 MG/1
40 TABLET, FILM COATED ORAL NIGHTLY
Qty: 30 TABLET | Refills: 3 | Status: SHIPPED | OUTPATIENT
Start: 2024-03-02

## 2024-03-02 RX ORDER — PANTOPRAZOLE SODIUM 40 MG/1
40 TABLET, DELAYED RELEASE ORAL
Qty: 30 TABLET | Refills: 3 | Status: SHIPPED | OUTPATIENT
Start: 2024-03-03

## 2024-03-02 RX ORDER — CIPROFLOXACIN AND DEXAMETHASONE 3; 1 MG/ML; MG/ML
4 SUSPENSION/ DROPS AURICULAR (OTIC) 2 TIMES DAILY
Qty: 2 ML | Refills: 0 | Status: SHIPPED | OUTPATIENT
Start: 2024-03-02 | End: 2024-03-07

## 2024-03-02 RX ADMIN — HYDRALAZINE HYDROCHLORIDE 100 MG: 50 TABLET ORAL at 08:12

## 2024-03-02 RX ADMIN — CIPROFLOXACIN AND DEXAMETHASONE 4 DROP: 3; 1 SUSPENSION/ DROPS AURICULAR (OTIC) at 08:12

## 2024-03-02 RX ADMIN — FLUTICASONE PROPIONATE 1 SPRAY: 50 SPRAY, METERED NASAL at 08:12

## 2024-03-02 RX ADMIN — ISOSORBIDE MONONITRATE 30 MG: 30 TABLET, EXTENDED RELEASE ORAL at 08:12

## 2024-03-02 RX ADMIN — CARVEDILOL 25 MG: 25 TABLET, FILM COATED ORAL at 08:13

## 2024-03-02 RX ADMIN — OXYCODONE 5 MG: 5 TABLET ORAL at 08:13

## 2024-03-02 RX ADMIN — OXYCODONE 10 MG: 5 TABLET ORAL at 00:13

## 2024-03-02 RX ADMIN — CLOPIDOGREL BISULFATE 75 MG: 75 TABLET ORAL at 08:12

## 2024-03-02 RX ADMIN — Medication 10 ML: at 08:14

## 2024-03-02 RX ADMIN — FAMOTIDINE 20 MG: 10 INJECTION, SOLUTION INTRAVENOUS at 08:12

## 2024-03-02 RX ADMIN — ASPIRIN 81 MG 81 MG: 81 TABLET ORAL at 08:12

## 2024-03-02 RX ADMIN — SEVELAMER CARBONATE 800 MG: 800 TABLET, FILM COATED ORAL at 08:12

## 2024-03-02 RX ADMIN — SACUBITRIL AND VALSARTAN 1 TABLET: 24; 26 TABLET, FILM COATED ORAL at 08:13

## 2024-03-02 RX ADMIN — EZETIMIBE 10 MG: 10 TABLET ORAL at 08:12

## 2024-03-02 RX ADMIN — HYDROMORPHONE HYDROCHLORIDE 1 MG: 1 INJECTION, SOLUTION INTRAMUSCULAR; INTRAVENOUS; SUBCUTANEOUS at 01:43

## 2024-03-02 ASSESSMENT — PAIN DESCRIPTION - ORIENTATION
ORIENTATION: LEFT
ORIENTATION: LEFT

## 2024-03-02 ASSESSMENT — PAIN SCALES - GENERAL
PAINLEVEL_OUTOF10: 10
PAINLEVEL_OUTOF10: 9
PAINLEVEL_OUTOF10: 5
PAINLEVEL_OUTOF10: 5

## 2024-03-02 ASSESSMENT — PAIN DESCRIPTION - LOCATION
LOCATION: EAR;FACE
LOCATION: EAR;FACE

## 2024-03-02 NOTE — PROGRESS NOTES
Pt's verbal and written discharge instructions given, all questions answered,. IV removed. Follow up appointments made and discussed. New medications reviewed and sent to outpatient pharmacy for pt to pickup. Pt left in stable condition via ambulation, pt walked to pharmacy to  scripts and was meeting his ride out front in the main entrance.

## 2024-03-02 NOTE — PLAN OF CARE
Problem: Discharge Planning  Goal: Discharge to home or other facility with appropriate resources  3/1/2024 2138 by Terri Quintanilla RN  Outcome: Progressing  3/1/2024 1750 by Maria Eugenia Boudreaux RN  Outcome: Progressing  3/1/2024 1604 by Maria Eugenia Boudreaux RN  Outcome: Progressing     Problem: Pain  Goal: Verbalizes/displays adequate comfort level or baseline comfort level  3/1/2024 2138 by Terri Quintanilla RN  Outcome: Progressing  3/1/2024 1750 by Maria Eugenia Boudreaux RN  Outcome: Progressing  3/1/2024 1604 by Maria Eugenia Boudreaux RN  Outcome: Progressing     Problem: Safety - Adult  Goal: Free from fall injury  3/1/2024 2138 by Terri Quintanilla RN  Outcome: Progressing  3/1/2024 1750 by Maria Eugenia Boudreaux RN  Outcome: Progressing  3/1/2024 1604 by Maria Eugenia Boudreaux RN  Outcome: Progressing     Problem: Nutrition Deficit:  Goal: Optimize nutritional status  Outcome: Progressing     Problem: Chronic Conditions and Co-morbidities  Goal: Patient's chronic conditions and co-morbidity symptoms are monitored and maintained or improved  Outcome: Progressing     Problem: ABCDS Injury Assessment  Goal: Absence of physical injury  Outcome: Progressing

## 2024-03-02 NOTE — PROGRESS NOTES
Per prior offgoing RN patient at 2am was  \"trying to go outside order pizza\" . since then, a bed alarm was placed as patient seemed drowsy. He remains drowsy but alert,  pupils pinpoint, unsteady on feet. all vitals stable. When asked if he took any outside drugs, he says no. + drug history. Was given oxy and Prn dilaudid around midnight and 130am. Was just found walking unit again, says he was going for a stroll, RN escorted patient back to room, where he walked into the contact isolation cart. RN provided steadying assist back to bed and placed bed alarm on. MD Della Boston was notified via [employer] of above and inquired whether we needed to modify current pain regimen. Message read [phone]. Reponse [phone] asking \"are pain meds scheduled?\". This RN replied, no just PRN. There are no changes in orders at this time.

## 2024-03-02 NOTE — PLAN OF CARE
Problem: Discharge Planning  Goal: Discharge to home or other facility with appropriate resources  3/2/2024 0910 by Martina Brito, RN  Outcome: Progressing  Flowsheets (Taken 3/2/2024 0910)  Discharge to home or other facility with appropriate resources:   Identify barriers to discharge with patient and caregiver   Identify discharge learning needs (meds, wound care, etc)   Arrange for needed discharge resources and transportation as appropriate  Problem: Pain  Goal: Verbalizes/displays adequate comfort level or baseline comfort level  3/2/2024 0910 by Martina Brito, RN  Outcome: Progressing

## 2024-03-02 NOTE — PROGRESS NOTES
Shift assessment completed.  Pt A&O x4, VSS. Pt very anxious and wanting to go home this morning and smoke a cigarette. AVASYS & bed alarm active for safety. Non skid socks on. Pt reporting face pain 5/10 on the pain scale, PRN oral pain medication given per MAR. Bed locked and in lowest position.  Call light and bedside table within reach. Will continue to monitor.

## 2024-03-02 NOTE — PROGRESS NOTES
Pt found ambulating in hallway, when asked where he was going, said he was just taking a stroll. RN reminded pt he is not to be up on his own. RN escorted patient back to room, where he walked into the cart outside his room. No injury. RN provided steadying assist back to bed. Bed alarm set.

## 2024-03-02 NOTE — DISCHARGE SUMMARY
V2.0  Discharge Summary    Name:  Heber Wheeler /Age/Sex: 1970 (53 y.o. male)   Admit Date: 2024  Discharge Date: 3/2/24    MRN & CSN:  [id number] & [id number] Encounter Date and Time 3/2/24 8:51 AM EST    Attending:  Beatriz Bello MD Discharging Provider: Beatriz Bello MD       Hospital Course:     Brief HPI: Heber Wheeler is a 53 y.o. male who presented with hypertension, hyperlipidemia, ESRD on HD MWF, bipolar type I, T2DM, chronic hepatitis C, chronic back pain, marijuana abuse presented from home with chest pain and flulike symptoms     Brief Problem Based Course:   HTN - w/out known CAD and no evidence of active signs/sxs of ischemia/failure. Currently controlled on home meds w/ vitals documented and reviewed.     ESRD - on HD M/W/F - apparently last HD PTArrival was .  Nephrology consulted and appreciated - s/p HD . Continue  Entresto      CHF - acute diastolic failure w/ preserved EF 60-65% by Echo dated Dec 2023.  Likely due to hypertensive heart disease. Continue diuresis as currently ordered w/ close monitoring of Renal function and electrolytes for ADR.  Continue current medical management and follow I/O as well as clinical response.      Viral URI - Negative COVID/Influenza.  Conservative management and supportive care.     L ear pain - likely 2nd to above w/out overt infection but started on Ciprodex  and now progressive - CT Head ordered and pending.  Pain medication increased.       Bipolar type I - controlled on home Seroquel - continued.       The patient expressed appropriate understanding of, and agreement with the discharge recommendations, medications, and plan.     Consults this admission:  IP CONSULT TO CARDIOLOGY  IP CONSULT TO NEPHROLOGY  IP CONSULT TO GI    Discharge Diagnosis:   Chest pain  ESRD HD    Discharge Instruction:   Follow up appointments: PCP and Nephro  Primary care physician: Carline Pugh within 2

## 2024-03-02 NOTE — PROGRESS NOTES
Pt agitated, out of bed, bed alarm alarming. Staff entered room, pt wanting to walk hallways, reminded he needs staff assistance when up, became agitated, saying he was going to leave. Was able to be escorted back to bed. Told staff to leave room, remains agitated. New order for video monitoring.

## 2024-11-04 NOTE — CONSULTS
Daily  doxazosin (CARDURA) tablet 2 mg, Nightly  ezetimibe (ZETIA) tablet 10 mg, Daily  hydrALAZINE (APRESOLINE) tablet 100 mg, TID  isosorbide mononitrate (IMDUR) extended release tablet 30 mg, Daily  pantoprazole (PROTONIX) tablet 40 mg, QAM AC  [Held by provider] sacubitril-valsartan (ENTRESTO) 24-26 MG per tablet 1 tablet, BID  sevelamer (RENVELA) tablet 800 mg, TID WC  nitroGLYCERIN (NITROSTAT) SL tablet 0.4 mg, Q5 Min PRN  fluticasone (FLONASE) 50 MCG/ACT nasal spray 1 spray, Daily  heparin (porcine) injection 3,810 Units, PRN  heparin (porcine) injection 1,910 Units, PRN  heparin 25,000 units in dextrose 5% 250 mL (premix) infusion, Continuous  carvedilol (COREG) tablet 25 mg, BID WC  HYDROmorphone HCl PF (DILAUDID) injection 0.5 mg, Q4H PRN  sodium chloride flush 0.9 % injection 5-40 mL, 2 times per day  sodium chloride flush 0.9 % injection 5-40 mL, PRN  0.9 % sodium chloride infusion, PRN  ondansetron (ZOFRAN-ODT) disintegrating tablet 4 mg, Q8H PRN   Or  ondansetron (ZOFRAN) injection 4 mg, Q6H PRN  acetaminophen (TYLENOL) tablet 650 mg, Q6H PRN   Or  acetaminophen (TYLENOL) suppository 650 mg, Q6H PRN  aspirin chewable tablet 81 mg, Daily  atorvastatin (LIPITOR) tablet 40 mg, Nightly  QUEtiapine (SEROQUEL) tablet 200 mg, Nightly  benzonatate (TESSALON) capsule 100 mg, TID PRN        Allergies:  Mushroom extract complex, Metformin and related, Penicillins, Ultram [tramadol hcl], and Codeine     Review of Systems:   A 14 point review of symptoms completed. Pertinent positives identified in the HPI, all other review of symptoms negative as below.      Objective   PHYSICAL EXAM:    Vitals:    02/26/24 1044   BP: (!) 152/53   Pulse: 83   Resp: 18   Temp: 98.8 °F (37.1 °C)   SpO2: 92%    Weight - Scale: 58.5 kg (129 lb)         General Appearance:  Alert, cooperative, no distress, appears older than stated age, lying flat, able to speak in full sentences   Nose: Nares normal, no drainage or sinus tenderness  YASMANY Munoz     Procedure Details   Procedure: Coronary angiography, PCI of the distal RCA with a drug-eluting stent     : Bert Munoz M.D.     Indication: This is a staged intervention from an acute anterior wall myocardial infarction     Technical portion of the procedure: The patient was prepped and draped supine on angiography table in a sterile fashion. A time out was performed. Conscious sedation was administered with divided doses of Versed and fentanyl. 2% lidocaine was administered to the right wrist region. Using a modified Seldinger technique a 6 Nigerien sheath was placed in the right radial artery. A spasmolytic cocktail consisting of 5 mg of verapamil and 300 mcg of nitroglycerin was administered intra-arterially through the sheath. A 6 Nigerien IR 1.5 guide catheter was used for right coronary angiography. Multiple orthogonal views were obtained. Heparin was administered IV and ACT monitoring was performed. Coronary wire was directed down the right coronary artery. We predilated with a 3 mm balloon at high pressure. We then placed a 3.5 x 20 mm Synergy drug-eluting stent. We then postdilated with a 3.5 mm noncompliant balloon up to 24 nirmal. The catheter and sheath were removed from the body and a TR band was used for hemostasis. The patient was brought to the post procedural area in stable condition.           EBL: 10-30 milliliters   Specimen samples taken: none     Coronary Findings Diagnostic Dominance: Right   Right Coronary Artery: Dist RCA lesion is 90% stenosed. Culprit lesion. RACHELL flow is 3. The lesion is discrete and eccentric. The lesion is not chronically occluded.     Intervention   Dist RCA lesion: Stent: Stent was successfully placed. Supplies Used: STENT SYNERGY XD 3.53V29EX Post-Intervention Lesion Assessment: The intervention was successful. Intentional subintimal strategy was not used. Post-intervention RACHELL flow is 3. Lesion had 20 mm of its length treated. There were no  No

## (undated) DEVICE — CONMED SCOPE SAVER BITE BLOCK, 20X27 MM: Brand: SCOPE SAVER

## (undated) DEVICE — FORCEPS BX L240CM JAW DIA2.8MM L CAP W/ NDL MIC MESH TOOTH

## (undated) DEVICE — ENDOSCOPIC KIT 2 12 FT OP4 DE2 GWN SYR

## (undated) DEVICE — CANNULA NSL AD TBNG L7FT PVC STR NONFLARED PRNG O2 DEL W STD

## (undated) DEVICE — ELECTRODE,ECG,STRESS,FOAM,3PK: Brand: MEDLINE